# Patient Record
Sex: MALE | Race: WHITE | Employment: OTHER | URBAN - NONMETROPOLITAN AREA
[De-identification: names, ages, dates, MRNs, and addresses within clinical notes are randomized per-mention and may not be internally consistent; named-entity substitution may affect disease eponyms.]

---

## 2017-03-06 ENCOUNTER — HOSPITAL ENCOUNTER (OUTPATIENT)
Dept: VASCULAR LAB | Age: 72
Discharge: HOME | End: 2017-03-06
Payer: MEDICARE

## 2017-03-06 DIAGNOSIS — R25.2 CRAMPS OF LOWER EXTREMITY, UNSPECIFIED LATERALITY: Primary | ICD-10-CM

## 2017-03-06 DIAGNOSIS — I73.9 PVD (PERIPHERAL VASCULAR DISEASE) WITH CLAUDICATION (HCC): ICD-10-CM

## 2017-03-06 DIAGNOSIS — I73.89 ACROCYANOSIS (HCC): ICD-10-CM

## 2017-03-06 DIAGNOSIS — I73.9 PVD (PERIPHERAL VASCULAR DISEASE) (HCC): ICD-10-CM

## 2017-03-06 PROCEDURE — 93923 UPR/LXTR ART STDY 3+ LVLS: CPT

## 2017-03-15 ENCOUNTER — OFFICE VISIT (OUTPATIENT)
Dept: VASCULAR SURGERY | Age: 72
End: 2017-03-15
Payer: MEDICARE

## 2017-03-15 ENCOUNTER — HOSPITAL ENCOUNTER (OUTPATIENT)
Dept: VASCULAR LAB | Age: 72
Discharge: HOME | End: 2017-03-15
Payer: MEDICARE

## 2017-03-15 VITALS
TEMPERATURE: 98.3 F | DIASTOLIC BLOOD PRESSURE: 88 MMHG | HEART RATE: 87 BPM | RESPIRATION RATE: 18 BRPM | SYSTOLIC BLOOD PRESSURE: 158 MMHG

## 2017-03-15 DIAGNOSIS — I70.213 ATHEROSCLER OF NATIVE ARTERY OF BOTH LEGS WITH INTERMIT CLAUDICATION (HCC): ICD-10-CM

## 2017-03-15 DIAGNOSIS — I65.23 BILATERAL CAROTID ARTERY STENOSIS: Primary | ICD-10-CM

## 2017-03-15 DIAGNOSIS — I71.40 AAA (ABDOMINAL AORTIC ANEURYSM) WITHOUT RUPTURE: ICD-10-CM

## 2017-03-15 PROCEDURE — G8427 DOCREV CUR MEDS BY ELIG CLIN: HCPCS | Performed by: NURSE PRACTITIONER

## 2017-03-15 PROCEDURE — 4040F PNEUMOC VAC/ADMIN/RCVD: CPT | Performed by: NURSE PRACTITIONER

## 2017-03-15 PROCEDURE — 99213 OFFICE O/P EST LOW 20 MIN: CPT | Performed by: NURSE PRACTITIONER

## 2017-03-15 PROCEDURE — 3017F COLORECTAL CA SCREEN DOC REV: CPT | Performed by: NURSE PRACTITIONER

## 2017-03-15 PROCEDURE — G8598 ASA/ANTIPLAT THER USED: HCPCS | Performed by: NURSE PRACTITIONER

## 2017-03-15 PROCEDURE — 4004F PT TOBACCO SCREEN RCVD TLK: CPT | Performed by: NURSE PRACTITIONER

## 2017-03-15 PROCEDURE — G8419 CALC BMI OUT NRM PARAM NOF/U: HCPCS | Performed by: NURSE PRACTITIONER

## 2017-03-15 PROCEDURE — G8484 FLU IMMUNIZE NO ADMIN: HCPCS | Performed by: NURSE PRACTITIONER

## 2017-03-15 PROCEDURE — 93880 EXTRACRANIAL BILAT STUDY: CPT

## 2017-03-15 PROCEDURE — 1123F ACP DISCUSS/DSCN MKR DOCD: CPT | Performed by: NURSE PRACTITIONER

## 2017-03-16 ENCOUNTER — TELEPHONE (OUTPATIENT)
Dept: VASCULAR SURGERY | Age: 72
End: 2017-03-16

## 2017-03-20 ENCOUNTER — HOSPITAL ENCOUNTER (OUTPATIENT)
Dept: CT IMAGING | Age: 72
Discharge: HOME | End: 2017-03-20
Payer: MEDICARE

## 2017-03-20 DIAGNOSIS — I65.23 BILATERAL CAROTID ARTERY STENOSIS: ICD-10-CM

## 2017-03-20 LAB
GFR NON-AFRICAN AMERICAN: 60
PERFORMED ON: ABNORMAL
POC CREATININE: 1.2 MG/DL (ref 0.3–1.3)
POC SAMPLE TYPE: ABNORMAL

## 2017-03-20 PROCEDURE — 82565 ASSAY OF CREATININE: CPT

## 2017-03-20 PROCEDURE — 70498 CT ANGIOGRAPHY NECK: CPT

## 2017-03-20 PROCEDURE — 70496 CT ANGIOGRAPHY HEAD: CPT

## 2017-03-20 PROCEDURE — 6360000004 HC RX CONTRAST MEDICATION: Performed by: NURSE PRACTITIONER

## 2017-03-20 RX ADMIN — IOVERSOL 90 ML: 741 INJECTION INTRA-ARTERIAL; INTRAVENOUS at 11:46

## 2017-03-29 ENCOUNTER — OFFICE VISIT (OUTPATIENT)
Dept: CARDIOLOGY | Facility: CLINIC | Age: 72
End: 2017-03-29

## 2017-03-29 VITALS
HEIGHT: 72 IN | DIASTOLIC BLOOD PRESSURE: 78 MMHG | HEART RATE: 61 BPM | OXYGEN SATURATION: 99 % | SYSTOLIC BLOOD PRESSURE: 152 MMHG | BODY MASS INDEX: 30.56 KG/M2 | WEIGHT: 225.6 LBS

## 2017-03-29 DIAGNOSIS — I25.10 CORONARY ARTERY DISEASE INVOLVING NATIVE CORONARY ARTERY OF NATIVE HEART WITHOUT ANGINA PECTORIS: ICD-10-CM

## 2017-03-29 DIAGNOSIS — Z95.5 STENTED CORONARY ARTERY: ICD-10-CM

## 2017-03-29 DIAGNOSIS — I10 ESSENTIAL HYPERTENSION: ICD-10-CM

## 2017-03-29 DIAGNOSIS — E78.2 MIXED HYPERLIPIDEMIA: Primary | ICD-10-CM

## 2017-03-29 PROCEDURE — 93000 ELECTROCARDIOGRAM COMPLETE: CPT | Performed by: INTERNAL MEDICINE

## 2017-03-29 PROCEDURE — 99214 OFFICE O/P EST MOD 30 MIN: CPT | Performed by: INTERNAL MEDICINE

## 2017-03-29 RX ORDER — ATORVASTATIN CALCIUM 40 MG/1
40 TABLET, FILM COATED ORAL DAILY
COMMUNITY
End: 2017-06-09 | Stop reason: SDUPTHER

## 2017-03-29 RX ORDER — AMOXICILLIN 500 MG/1
1000 CAPSULE ORAL 2 TIMES DAILY
COMMUNITY
End: 2017-04-18

## 2017-03-29 NOTE — PROGRESS NOTES
Robin Herrera  [id number]  1945  71 y.o.  male    Referring Provider: Wilber Rivera Jr., MD    Reason for Follow-up Visit:CAD Stented 20101 now awaiting left carotid endarterectomy for severe stenosis  Subjective .   Doing well No chest pain or shortness of breath  Fairly active    History of present illness:  Robin Herrera is a 71 y.o. yo male with history of CAD who presents today for   Chief Complaint   Patient presents with   • Coronary Artery Disease     6 month f/u    .    History  Past Medical History:   Diagnosis Date   • AAA (abdominal aortic aneurysm)    • CAD in native artery    • Headache    • Hypertension    ,   Past Surgical History:   Procedure Laterality Date   • CARDIAC CATHETERIZATION     • CAROTID STENT  2010   • EYE SURGERY Bilateral     cataracts    • PROSTATECTOMY     • TONSILLECTOMY     ,   Family History   Problem Relation Age of Onset   • Alzheimer's disease Father    • Kidney failure Father    • Coronary artery disease Neg Hx    ,   Social History   Substance Use Topics   • Smoking status: Former Smoker     Packs/day: 0.50     Types: Cigarettes   • Smokeless tobacco: None      Comment: 03/2017   • Alcohol use Yes      Comment: MODERATE DRINKS BEER   ,     Medications  Current Outpatient Prescriptions   Medication Sig Dispense Refill   • Acetylcysteine 600 MG capsule Take  by mouth.     • amLODIPine (NORVASC) 5 MG tablet Take 5 mg by mouth daily.     • amoxicillin (AMOXIL) 500 MG capsule Take 1,000 mg by mouth 2 (Two) Times a Day.     • aspirin 325 MG tablet Take 325 mg by mouth daily.     • atenolol (TENORMIN) 50 MG tablet Take 25 mg by mouth daily.     • atorvastatin (LIPITOR) 40 MG tablet Take 40 mg by mouth Daily.     • cholecalciferol (VITAMIN D3) 1000 UNITS tablet Take 1,000 Units by mouth daily.     • coenzyme Q10 100 MG capsule Take 100 mg by mouth daily.     • levothyroxine (SYNTHROID, LEVOTHROID) 137 MCG tablet Take 0.125 mcg by mouth Daily.     • lisinopril  "(PRINIVIL,ZESTRIL) 20 MG tablet Take 20 mg by mouth daily.     • Multiple Vitamins-Minerals (MULTIVITAMIN ADULT PO) Take  by mouth.     • nicotine polacrilex (NICORETTE) 4 MG gum Chew 4 mg As Needed for smoking cessation.     • prednisoLONE acetate (PRED MILD) 0.12 % ophthalmic suspension Administer 1 drop into the left eye 2 (Two) Times a Day.     • nitroglycerin (NITROSTAT) 0.4 MG SL tablet Place 0.4 mg under the tongue every 5 (five) minutes as needed for chest pain. Take no more than 3 doses in 15 minutes.       No current facility-administered medications for this visit.        Allergies:  Pollen extract    Review of Systems  Review of Systems   Constitution: Negative.   HENT: Negative.    Eyes: Negative.    Cardiovascular: Negative for chest pain, claudication, cyanosis, dyspnea on exertion, irregular heartbeat, leg swelling, near-syncope, orthopnea, palpitations, paroxysmal nocturnal dyspnea and syncope.   Respiratory: Negative.    Endocrine: Negative.    Hematologic/Lymphatic: Negative.    Skin: Negative.    Gastrointestinal: Negative for anorexia.   Genitourinary: Negative.    Neurological: Negative.    Psychiatric/Behavioral: Negative.        Objective     Physical Exam:  /78 (BP Location: Left arm, Patient Position: Sitting, Cuff Size: Adult)  Pulse 61  Ht 72\" (182.9 cm)  Wt 225 lb 9.6 oz (102 kg)  SpO2 99%  BMI 30.6 kg/m2  Physical Exam   Constitutional: He appears well-developed.   HENT:   Head: Normocephalic.   Neck: Normal carotid pulses and no JVD present. No tracheal tenderness present. Carotid bruit is not present. No tracheal deviation and no edema present.   Cardiovascular: Regular rhythm, normal heart sounds and normal pulses.    Pulmonary/Chest: Effort normal. No stridor.   Abdominal: Soft.   Neurological: He is alert. He has normal strength. No cranial nerve deficit or sensory deficit.   Skin: Skin is warm.   Psychiatric: He has a normal mood and affect. His speech is normal and " behavior is normal.       Results Review:       ECG 12 Lead  Date/Time: 3/29/2017 8:40 AM  Performed by: STEPHANIE DOWLING  Authorized by: STEPHANIE DOWLING   Comparison: compared with previous ECG from 9/27/2016  Comparison to previous ECG: RBBB new  Rhythm: sinus rhythm  Rate: normal  Conduction: right bundle branch block  QRS axis: normal  Clinical impression: abnormal ECG            Assessment/Plan   Patient Active Problem List   Diagnosis   • Hyperlipemia   • Essential hypertension   • Coronary artery disease involving native coronary artery of native heart without angina pectoris   • Stented coronary artery       Stable doing well. No chest pain or excessive dyspnea. No palpitations. No significant pedal edema. Compliant with medications and diet. Latest labs and medications reviewed.  Stress nuclear 2016 normal    Plan:    Acceptable Cardiovascular risk of carotid endarterectomy  No additional cardiac testing required at this point in time.  Keep follow up with me in 9 months  Close follow up with you as scheduled.  Intensive risk factor modifications  See order list.       Return in about 9 months (around 12/29/2017).

## 2017-03-30 ENCOUNTER — HOSPITAL ENCOUNTER (OUTPATIENT)
Dept: PREADMISSION TESTING | Age: 72
Discharge: HOME | End: 2017-03-30
Payer: MEDICARE

## 2017-03-30 ENCOUNTER — TELEPHONE (OUTPATIENT)
Dept: VASCULAR SURGERY | Age: 72
End: 2017-03-30

## 2017-03-30 ENCOUNTER — HOSPITAL ENCOUNTER (OUTPATIENT)
Dept: GENERAL RADIOLOGY | Age: 72
Discharge: HOME | End: 2017-03-30
Payer: MEDICARE

## 2017-03-30 ENCOUNTER — OFFICE VISIT (OUTPATIENT)
Dept: VASCULAR SURGERY | Age: 72
End: 2017-03-30
Payer: MEDICARE

## 2017-03-30 VITALS — HEIGHT: 72 IN | WEIGHT: 223 LBS | BODY MASS INDEX: 30.2 KG/M2

## 2017-03-30 VITALS
DIASTOLIC BLOOD PRESSURE: 80 MMHG | TEMPERATURE: 97.5 F | HEART RATE: 60 BPM | SYSTOLIC BLOOD PRESSURE: 162 MMHG | RESPIRATION RATE: 18 BRPM

## 2017-03-30 DIAGNOSIS — K04.7 TOOTH ABSCESS: Primary | ICD-10-CM

## 2017-03-30 DIAGNOSIS — I65.23 BILATERAL CAROTID ARTERY STENOSIS: Primary | ICD-10-CM

## 2017-03-30 LAB
ANION GAP SERPL CALCULATED.3IONS-SCNC: 15 MMOL/L (ref 7–19)
APTT: 29.5 SEC (ref 26–36.2)
BASOPHILS ABSOLUTE: 0 K/UL (ref 0–0.2)
BASOPHILS RELATIVE PERCENT: 0.4 % (ref 0–1)
BUN BLDV-MCNC: 18 MG/DL (ref 8–23)
CALCIUM SERPL-MCNC: 10.3 MG/DL (ref 8.8–10.2)
CHLORIDE BLD-SCNC: 100 MMOL/L (ref 98–111)
CO2: 26 MMOL/L (ref 22–29)
CREAT SERPL-MCNC: 1.1 MG/DL (ref 0.5–1.2)
EOSINOPHILS ABSOLUTE: 0.1 K/UL (ref 0–0.6)
EOSINOPHILS RELATIVE PERCENT: 1.5 % (ref 0–5)
GFR NON-AFRICAN AMERICAN: >60
GLUCOSE BLD-MCNC: 83 MG/DL (ref 74–109)
HCT VFR BLD CALC: 42.2 % (ref 42–52)
HEMOGLOBIN: 15.1 G/DL (ref 14–18)
INR BLD: 1 (ref 0.88–1.18)
LYMPHOCYTES ABSOLUTE: 2.1 K/UL (ref 1.1–4.5)
LYMPHOCYTES RELATIVE PERCENT: 25.7 % (ref 20–40)
MCH RBC QN AUTO: 35.4 PG (ref 27–31)
MCHC RBC AUTO-ENTMCNC: 35.8 G/DL (ref 33–37)
MCV RBC AUTO: 98.8 FL (ref 80–94)
MONOCYTES ABSOLUTE: 0.9 K/UL (ref 0–0.9)
MONOCYTES RELATIVE PERCENT: 11.6 % (ref 0–10)
NEUTROPHILS ABSOLUTE: 4.9 K/UL (ref 1.5–7.5)
NEUTROPHILS RELATIVE PERCENT: 60.8 % (ref 50–65)
PDW BLD-RTO: 12.8 % (ref 11.5–14.5)
PLATELET # BLD: 210 K/UL (ref 130–400)
PMV BLD AUTO: 12.3 FL (ref 7.4–10.4)
POTASSIUM SERPL-SCNC: 3.5 MMOL/L (ref 3.5–5)
PROTHROMBIN TIME: 13.2 SEC (ref 12–14.6)
RBC # BLD: 4.27 M/UL (ref 4.7–6.1)
SODIUM BLD-SCNC: 141 MMOL/L (ref 136–145)
WBC # BLD: 8 K/UL (ref 4.8–10.8)

## 2017-03-30 PROCEDURE — 80048 BASIC METABOLIC PNL TOTAL CA: CPT

## 2017-03-30 PROCEDURE — 71020 XR CHEST STANDARD TWO VW: CPT

## 2017-03-30 PROCEDURE — 4040F PNEUMOC VAC/ADMIN/RCVD: CPT | Performed by: NURSE PRACTITIONER

## 2017-03-30 PROCEDURE — G8484 FLU IMMUNIZE NO ADMIN: HCPCS | Performed by: NURSE PRACTITIONER

## 2017-03-30 PROCEDURE — 1036F TOBACCO NON-USER: CPT | Performed by: NURSE PRACTITIONER

## 2017-03-30 PROCEDURE — G8419 CALC BMI OUT NRM PARAM NOF/U: HCPCS | Performed by: NURSE PRACTITIONER

## 2017-03-30 PROCEDURE — 1123F ACP DISCUSS/DSCN MKR DOCD: CPT | Performed by: NURSE PRACTITIONER

## 2017-03-30 PROCEDURE — 99213 OFFICE O/P EST LOW 20 MIN: CPT | Performed by: NURSE PRACTITIONER

## 2017-03-30 PROCEDURE — 85610 PROTHROMBIN TIME: CPT

## 2017-03-30 PROCEDURE — G8427 DOCREV CUR MEDS BY ELIG CLIN: HCPCS | Performed by: NURSE PRACTITIONER

## 2017-03-30 PROCEDURE — 85730 THROMBOPLASTIN TIME PARTIAL: CPT

## 2017-03-30 PROCEDURE — 3017F COLORECTAL CA SCREEN DOC REV: CPT | Performed by: NURSE PRACTITIONER

## 2017-03-30 PROCEDURE — 85025 COMPLETE CBC W/AUTO DIFF WBC: CPT

## 2017-03-30 PROCEDURE — G8598 ASA/ANTIPLAT THER USED: HCPCS | Performed by: NURSE PRACTITIONER

## 2017-03-30 PROCEDURE — 87070 CULTURE OTHR SPECIMN AEROBIC: CPT

## 2017-03-30 RX ORDER — LEVOTHYROXINE SODIUM 0.12 MG/1
137 TABLET ORAL DAILY
COMMUNITY

## 2017-03-30 RX ORDER — NITROGLYCERIN 0.4 MG/1
0.4 TABLET SUBLINGUAL
COMMUNITY
End: 2017-03-30

## 2017-03-30 RX ORDER — CLOPIDOGREL BISULFATE 75 MG/1
75 TABLET ORAL DAILY
Qty: 30 TABLET | Refills: 3 | Status: ON HOLD | OUTPATIENT
Start: 2017-03-30 | End: 2017-04-14

## 2017-03-30 RX ORDER — AMOXICILLIN 500 MG/1
500 CAPSULE ORAL 4 TIMES DAILY
Status: ON HOLD | COMMUNITY
End: 2017-04-12 | Stop reason: ALTCHOICE

## 2017-03-31 ENCOUNTER — PREP FOR PROCEDURE (OUTPATIENT)
Dept: VASCULAR SURGERY | Age: 72
End: 2017-03-31

## 2017-03-31 LAB — MRSA CULTURE ONLY: NORMAL

## 2017-03-31 RX ORDER — SODIUM CHLORIDE 0.9 % (FLUSH) 0.9 %
10 SYRINGE (ML) INJECTION EVERY 12 HOURS SCHEDULED
Status: CANCELLED | OUTPATIENT
Start: 2017-03-31

## 2017-03-31 RX ORDER — SODIUM CHLORIDE 0.9 % (FLUSH) 0.9 %
10 SYRINGE (ML) INJECTION PRN
Status: CANCELLED | OUTPATIENT
Start: 2017-03-31

## 2017-03-31 RX ORDER — ASPIRIN 81 MG/1
81 TABLET ORAL ONCE
Status: CANCELLED | OUTPATIENT
Start: 2017-03-31 | End: 2017-03-31

## 2017-04-10 ENCOUNTER — TELEPHONE (OUTPATIENT)
Dept: VASCULAR SURGERY | Age: 72
End: 2017-04-10

## 2017-04-12 ENCOUNTER — ANESTHESIA (OUTPATIENT)
Dept: OPERATING ROOM | Age: 72
DRG: 038 | End: 2017-04-12
Payer: MEDICARE

## 2017-04-12 ENCOUNTER — HOSPITAL ENCOUNTER (INPATIENT)
Age: 72
LOS: 2 days | Discharge: HOME | DRG: 038 | End: 2017-04-14
Attending: SURGERY | Admitting: SURGERY
Payer: MEDICARE

## 2017-04-12 ENCOUNTER — APPOINTMENT (OUTPATIENT)
Dept: INTERVENTIONAL RADIOLOGY/VASCULAR | Age: 72
DRG: 038 | End: 2017-04-12
Attending: SURGERY
Payer: MEDICARE

## 2017-04-12 ENCOUNTER — ANESTHESIA EVENT (OUTPATIENT)
Dept: OPERATING ROOM | Age: 72
DRG: 038 | End: 2017-04-12
Payer: MEDICARE

## 2017-04-12 VITALS — OXYGEN SATURATION: 100 % | TEMPERATURE: 97.9 F | RESPIRATION RATE: 3 BRPM

## 2017-04-12 PROCEDURE — C1776 JOINT DEVICE (IMPLANTABLE): HCPCS | Performed by: SURGERY

## 2017-04-12 PROCEDURE — 3700000000 HC ANESTHESIA ATTENDED CARE: Performed by: SURGERY

## 2017-04-12 PROCEDURE — 2720000001 HC MISC SURG SUPPLY STERILE $51-500: Performed by: SURGERY

## 2017-04-12 PROCEDURE — 6360000002 HC RX W HCPCS: Performed by: SURGERY

## 2017-04-12 PROCEDURE — 35301 RECHANNELING OF ARTERY: CPT | Performed by: SURGERY

## 2017-04-12 PROCEDURE — 6370000000 HC RX 637 (ALT 250 FOR IP): Performed by: NURSE PRACTITIONER

## 2017-04-12 PROCEDURE — 2500000003 HC RX 250 WO HCPCS: Performed by: NURSE ANESTHETIST, CERTIFIED REGISTERED

## 2017-04-12 PROCEDURE — 2580000003 HC RX 258: Performed by: SURGERY

## 2017-04-12 PROCEDURE — 03CL0ZZ EXTIRPATION OF MATTER FROM LEFT INTERNAL CAROTID ARTERY, OPEN APPROACH: ICD-10-PCS | Performed by: SURGERY

## 2017-04-12 PROCEDURE — 7100000001 HC PACU RECOVERY - ADDTL 15 MIN: Performed by: SURGERY

## 2017-04-12 PROCEDURE — 3600000005 HC SURGERY LEVEL 5 BASE: Performed by: SURGERY

## 2017-04-12 PROCEDURE — C1729 CATH, DRAINAGE: HCPCS | Performed by: SURGERY

## 2017-04-12 PROCEDURE — 03CN0ZZ EXTIRPATION OF MATTER FROM LEFT EXTERNAL CAROTID ARTERY, OPEN APPROACH: ICD-10-PCS | Performed by: SURGERY

## 2017-04-12 PROCEDURE — 03UL0KZ SUPPLEMENT LEFT INTERNAL CAROTID ARTERY WITH NONAUTOLOGOUS TISSUE SUBSTITUTE, OPEN APPROACH: ICD-10-PCS | Performed by: SURGERY

## 2017-04-12 PROCEDURE — 2580000003 HC RX 258: Performed by: ANESTHESIOLOGY

## 2017-04-12 PROCEDURE — 6370000000 HC RX 637 (ALT 250 FOR IP): Performed by: SURGERY

## 2017-04-12 PROCEDURE — 3700000001 HC ADD 15 MINUTES (ANESTHESIA): Performed by: SURGERY

## 2017-04-12 PROCEDURE — L8612 AQUEOUS SHUNT PROSTHESIS: HCPCS | Performed by: SURGERY

## 2017-04-12 PROCEDURE — 3600000015 HC SURGERY LEVEL 5 ADDTL 15MIN: Performed by: SURGERY

## 2017-04-12 PROCEDURE — 6360000002 HC RX W HCPCS: Performed by: NURSE PRACTITIONER

## 2017-04-12 PROCEDURE — 6360000002 HC RX W HCPCS: Performed by: NURSE ANESTHETIST, CERTIFIED REGISTERED

## 2017-04-12 PROCEDURE — 7100000000 HC PACU RECOVERY - FIRST 15 MIN: Performed by: SURGERY

## 2017-04-12 PROCEDURE — 1210000000 HC MED SURG R&B

## 2017-04-12 PROCEDURE — 03CJ0ZZ EXTIRPATION OF MATTER FROM LEFT COMMON CAROTID ARTERY, OPEN APPROACH: ICD-10-PCS | Performed by: SURGERY

## 2017-04-12 PROCEDURE — 03UJ0KZ SUPPLEMENT LEFT COMMON CAROTID ARTERY WITH NONAUTOLOGOUS TISSUE SUBSTITUTE, OPEN APPROACH: ICD-10-PCS | Performed by: SURGERY

## 2017-04-12 PROCEDURE — 88304 TISSUE EXAM BY PATHOLOGIST: CPT

## 2017-04-12 PROCEDURE — 6360000002 HC RX W HCPCS: Performed by: ANESTHESIOLOGY

## 2017-04-12 DEVICE — PATCH BIOLOGIC VASC 1CM WX14CM L .55MM THICKNESSXENOSURE: Type: IMPLANTABLE DEVICE | Site: NECK | Status: FUNCTIONAL

## 2017-04-12 RX ORDER — LABETALOL HYDROCHLORIDE 5 MG/ML
5 INJECTION, SOLUTION INTRAVENOUS EVERY 10 MIN PRN
Status: DISCONTINUED | OUTPATIENT
Start: 2017-04-12 | End: 2017-04-12 | Stop reason: HOSPADM

## 2017-04-12 RX ORDER — AMLODIPINE BESYLATE 5 MG/1
5 TABLET ORAL DAILY
Status: DISCONTINUED | OUTPATIENT
Start: 2017-04-13 | End: 2017-04-14 | Stop reason: HOSPADM

## 2017-04-12 RX ORDER — EPHEDRINE SULFATE 50 MG/ML
INJECTION, SOLUTION INTRAVENOUS PRN
Status: DISCONTINUED | OUTPATIENT
Start: 2017-04-12 | End: 2017-04-12 | Stop reason: SDUPTHER

## 2017-04-12 RX ORDER — MIDAZOLAM HYDROCHLORIDE 1 MG/ML
2 INJECTION INTRAMUSCULAR; INTRAVENOUS
Status: COMPLETED | OUTPATIENT
Start: 2017-04-12 | End: 2017-04-12

## 2017-04-12 RX ORDER — FENTANYL CITRATE 50 UG/ML
25 INJECTION, SOLUTION INTRAMUSCULAR; INTRAVENOUS
Status: DISCONTINUED | OUTPATIENT
Start: 2017-04-12 | End: 2017-04-12 | Stop reason: HOSPADM

## 2017-04-12 RX ORDER — HYDROCODONE BITARTRATE AND ACETAMINOPHEN 5; 325 MG/1; MG/1
1 TABLET ORAL EVERY 4 HOURS PRN
Status: DISCONTINUED | OUTPATIENT
Start: 2017-04-12 | End: 2017-04-14 | Stop reason: HOSPADM

## 2017-04-12 RX ORDER — ASPIRIN 325 MG
325 TABLET ORAL DAILY
Status: DISCONTINUED | OUTPATIENT
Start: 2017-04-13 | End: 2017-04-14 | Stop reason: HOSPADM

## 2017-04-12 RX ORDER — PROPOFOL 10 MG/ML
INJECTION, EMULSION INTRAVENOUS PRN
Status: DISCONTINUED | OUTPATIENT
Start: 2017-04-12 | End: 2017-04-12 | Stop reason: SDUPTHER

## 2017-04-12 RX ORDER — ONDANSETRON 2 MG/ML
INJECTION INTRAMUSCULAR; INTRAVENOUS PRN
Status: DISCONTINUED | OUTPATIENT
Start: 2017-04-12 | End: 2017-04-12 | Stop reason: SDUPTHER

## 2017-04-12 RX ORDER — SODIUM CHLORIDE 9 MG/ML
INJECTION, SOLUTION INTRAVENOUS CONTINUOUS
Status: ACTIVE | OUTPATIENT
Start: 2017-04-12 | End: 2017-04-12

## 2017-04-12 RX ORDER — LIDOCAINE HYDROCHLORIDE 10 MG/ML
1 INJECTION, SOLUTION EPIDURAL; INFILTRATION; INTRACAUDAL; PERINEURAL ONCE
Status: DISCONTINUED | OUTPATIENT
Start: 2017-04-12 | End: 2017-04-12 | Stop reason: HOSPADM

## 2017-04-12 RX ORDER — ENALAPRILAT 2.5 MG/2ML
1.25 INJECTION INTRAVENOUS
Status: DISCONTINUED | OUTPATIENT
Start: 2017-04-12 | End: 2017-04-12 | Stop reason: HOSPADM

## 2017-04-12 RX ORDER — SODIUM CHLORIDE 0.9 % (FLUSH) 0.9 %
10 SYRINGE (ML) INJECTION EVERY 12 HOURS SCHEDULED
Status: DISCONTINUED | OUTPATIENT
Start: 2017-04-12 | End: 2017-04-14 | Stop reason: HOSPADM

## 2017-04-12 RX ORDER — LIDOCAINE HYDROCHLORIDE 10 MG/ML
INJECTION, SOLUTION EPIDURAL; INFILTRATION; INTRACAUDAL; PERINEURAL PRN
Status: DISCONTINUED | OUTPATIENT
Start: 2017-04-12 | End: 2017-04-12 | Stop reason: SDUPTHER

## 2017-04-12 RX ORDER — HYDRALAZINE HYDROCHLORIDE 20 MG/ML
5 INJECTION INTRAMUSCULAR; INTRAVENOUS EVERY 10 MIN PRN
Status: DISCONTINUED | OUTPATIENT
Start: 2017-04-12 | End: 2017-04-12 | Stop reason: HOSPADM

## 2017-04-12 RX ORDER — SODIUM CHLORIDE 0.9 % (FLUSH) 0.9 %
10 SYRINGE (ML) INJECTION PRN
Status: DISCONTINUED | OUTPATIENT
Start: 2017-04-12 | End: 2017-04-12 | Stop reason: HOSPADM

## 2017-04-12 RX ORDER — ROCURONIUM BROMIDE 10 MG/ML
INJECTION, SOLUTION INTRAVENOUS PRN
Status: DISCONTINUED | OUTPATIENT
Start: 2017-04-12 | End: 2017-04-12 | Stop reason: SDUPTHER

## 2017-04-12 RX ORDER — ONDANSETRON 2 MG/ML
4 INJECTION INTRAMUSCULAR; INTRAVENOUS EVERY 6 HOURS PRN
Status: DISCONTINUED | OUTPATIENT
Start: 2017-04-12 | End: 2017-04-14 | Stop reason: HOSPADM

## 2017-04-12 RX ORDER — CLOPIDOGREL BISULFATE 75 MG/1
75 TABLET ORAL DAILY
Status: DISCONTINUED | OUTPATIENT
Start: 2017-04-13 | End: 2017-04-14 | Stop reason: HOSPADM

## 2017-04-12 RX ORDER — SODIUM CHLORIDE, SODIUM LACTATE, POTASSIUM CHLORIDE, CALCIUM CHLORIDE 600; 310; 30; 20 MG/100ML; MG/100ML; MG/100ML; MG/100ML
INJECTION, SOLUTION INTRAVENOUS CONTINUOUS
Status: DISCONTINUED | OUTPATIENT
Start: 2017-04-12 | End: 2017-04-14 | Stop reason: HOSPADM

## 2017-04-12 RX ORDER — MORPHINE SULFATE 4 MG/ML
4 INJECTION, SOLUTION INTRAMUSCULAR; INTRAVENOUS EVERY 5 MIN PRN
Status: DISCONTINUED | OUTPATIENT
Start: 2017-04-12 | End: 2017-04-12 | Stop reason: HOSPADM

## 2017-04-12 RX ORDER — FENTANYL CITRATE 50 UG/ML
INJECTION, SOLUTION INTRAMUSCULAR; INTRAVENOUS PRN
Status: DISCONTINUED | OUTPATIENT
Start: 2017-04-12 | End: 2017-04-12 | Stop reason: SDUPTHER

## 2017-04-12 RX ORDER — SODIUM CHLORIDE, SODIUM LACTATE, POTASSIUM CHLORIDE, CALCIUM CHLORIDE 600; 310; 30; 20 MG/100ML; MG/100ML; MG/100ML; MG/100ML
INJECTION, SOLUTION INTRAVENOUS CONTINUOUS
Status: DISCONTINUED | OUTPATIENT
Start: 2017-04-12 | End: 2017-04-12

## 2017-04-12 RX ORDER — NITROGLYCERIN 0.4 MG/1
0.4 TABLET SUBLINGUAL EVERY 5 MIN PRN
Status: DISCONTINUED | OUTPATIENT
Start: 2017-04-12 | End: 2017-04-14 | Stop reason: HOSPADM

## 2017-04-12 RX ORDER — HYDROCODONE BITARTRATE AND ACETAMINOPHEN 5; 325 MG/1; MG/1
2 TABLET ORAL EVERY 4 HOURS PRN
Status: DISCONTINUED | OUTPATIENT
Start: 2017-04-12 | End: 2017-04-14 | Stop reason: HOSPADM

## 2017-04-12 RX ORDER — LEVOTHYROXINE SODIUM 0.12 MG/1
125 TABLET ORAL DAILY
Status: DISCONTINUED | OUTPATIENT
Start: 2017-04-13 | End: 2017-04-14 | Stop reason: HOSPADM

## 2017-04-12 RX ORDER — MEPERIDINE HYDROCHLORIDE 25 MG/ML
12.5 INJECTION INTRAMUSCULAR; INTRAVENOUS; SUBCUTANEOUS EVERY 5 MIN PRN
Status: DISCONTINUED | OUTPATIENT
Start: 2017-04-12 | End: 2017-04-12 | Stop reason: HOSPADM

## 2017-04-12 RX ORDER — SODIUM CHLORIDE 0.9 % (FLUSH) 0.9 %
10 SYRINGE (ML) INJECTION PRN
Status: DISCONTINUED | OUTPATIENT
Start: 2017-04-12 | End: 2017-04-14 | Stop reason: HOSPADM

## 2017-04-12 RX ORDER — ATENOLOL 25 MG/1
25 TABLET ORAL DAILY
Status: DISCONTINUED | OUTPATIENT
Start: 2017-04-13 | End: 2017-04-14 | Stop reason: HOSPADM

## 2017-04-12 RX ORDER — ATORVASTATIN CALCIUM 40 MG/1
40 TABLET, FILM COATED ORAL NIGHTLY
Status: DISCONTINUED | OUTPATIENT
Start: 2017-04-12 | End: 2017-04-14 | Stop reason: HOSPADM

## 2017-04-12 RX ORDER — ASPIRIN 81 MG/1
81 TABLET ORAL ONCE
Status: COMPLETED | OUTPATIENT
Start: 2017-04-12 | End: 2017-04-12

## 2017-04-12 RX ORDER — PROTAMINE SULFATE 10 MG/ML
INJECTION, SOLUTION INTRAVENOUS PRN
Status: DISCONTINUED | OUTPATIENT
Start: 2017-04-12 | End: 2017-04-12 | Stop reason: SDUPTHER

## 2017-04-12 RX ORDER — SODIUM CHLORIDE 0.9 % (FLUSH) 0.9 %
10 SYRINGE (ML) INJECTION EVERY 12 HOURS SCHEDULED
Status: DISCONTINUED | OUTPATIENT
Start: 2017-04-12 | End: 2017-04-12 | Stop reason: HOSPADM

## 2017-04-12 RX ORDER — PROMETHAZINE HYDROCHLORIDE 25 MG/ML
6.25 INJECTION, SOLUTION INTRAMUSCULAR; INTRAVENOUS
Status: DISCONTINUED | OUTPATIENT
Start: 2017-04-12 | End: 2017-04-12 | Stop reason: HOSPADM

## 2017-04-12 RX ORDER — DEXAMETHASONE SODIUM PHOSPHATE 10 MG/ML
INJECTION INTRAMUSCULAR; INTRAVENOUS PRN
Status: DISCONTINUED | OUTPATIENT
Start: 2017-04-12 | End: 2017-04-12 | Stop reason: SDUPTHER

## 2017-04-12 RX ORDER — LISINOPRIL 20 MG/1
20 TABLET ORAL DAILY
Status: DISCONTINUED | OUTPATIENT
Start: 2017-04-12 | End: 2017-04-14 | Stop reason: HOSPADM

## 2017-04-12 RX ORDER — LIDOCAINE HYDROCHLORIDE 10 MG/ML
1 INJECTION, SOLUTION EPIDURAL; INFILTRATION; INTRACAUDAL; PERINEURAL
Status: DISCONTINUED | OUTPATIENT
Start: 2017-04-12 | End: 2017-04-12 | Stop reason: HOSPADM

## 2017-04-12 RX ORDER — ACETAMINOPHEN 325 MG/1
650 TABLET ORAL EVERY 4 HOURS PRN
Status: DISCONTINUED | OUTPATIENT
Start: 2017-04-12 | End: 2017-04-14 | Stop reason: HOSPADM

## 2017-04-12 RX ORDER — CLONIDINE HYDROCHLORIDE 0.1 MG/1
0.1 TABLET ORAL
Status: DISCONTINUED | OUTPATIENT
Start: 2017-04-12 | End: 2017-04-14 | Stop reason: HOSPADM

## 2017-04-12 RX ORDER — METOCLOPRAMIDE HYDROCHLORIDE 5 MG/ML
10 INJECTION INTRAMUSCULAR; INTRAVENOUS
Status: DISCONTINUED | OUTPATIENT
Start: 2017-04-12 | End: 2017-04-12 | Stop reason: HOSPADM

## 2017-04-12 RX ORDER — MORPHINE SULFATE 4 MG/ML
2 INJECTION, SOLUTION INTRAMUSCULAR; INTRAVENOUS EVERY 5 MIN PRN
Status: DISCONTINUED | OUTPATIENT
Start: 2017-04-12 | End: 2017-04-12 | Stop reason: HOSPADM

## 2017-04-12 RX ORDER — DIPHENHYDRAMINE HYDROCHLORIDE 50 MG/ML
12.5 INJECTION INTRAMUSCULAR; INTRAVENOUS
Status: DISCONTINUED | OUTPATIENT
Start: 2017-04-12 | End: 2017-04-12 | Stop reason: HOSPADM

## 2017-04-12 RX ORDER — FENTANYL CITRATE 50 UG/ML
50 INJECTION, SOLUTION INTRAMUSCULAR; INTRAVENOUS
Status: DISCONTINUED | OUTPATIENT
Start: 2017-04-12 | End: 2017-04-12 | Stop reason: HOSPADM

## 2017-04-12 RX ORDER — HEPARIN SODIUM 1000 [USP'U]/ML
INJECTION, SOLUTION INTRAVENOUS; SUBCUTANEOUS PRN
Status: DISCONTINUED | OUTPATIENT
Start: 2017-04-12 | End: 2017-04-12 | Stop reason: SDUPTHER

## 2017-04-12 RX ADMIN — CEFAZOLIN SODIUM 1 G: 1 INJECTION, SOLUTION INTRAVENOUS at 22:14

## 2017-04-12 RX ADMIN — HEPARIN SODIUM 6000 UNITS: 1000 INJECTION, SOLUTION INTRAVENOUS; SUBCUTANEOUS at 08:36

## 2017-04-12 RX ADMIN — ROCURONIUM BROMIDE 30 MG: 10 INJECTION INTRAVENOUS at 07:55

## 2017-04-12 RX ADMIN — ROCURONIUM BROMIDE 20 MG: 10 INJECTION INTRAVENOUS at 08:42

## 2017-04-12 RX ADMIN — CEFAZOLIN SODIUM 1 G: 1 INJECTION, SOLUTION INTRAVENOUS at 15:02

## 2017-04-12 RX ADMIN — FENTANYL CITRATE 50 MCG: 50 INJECTION INTRAMUSCULAR; INTRAVENOUS at 08:16

## 2017-04-12 RX ADMIN — PHENYLEPHRINE HYDROCHLORIDE 200 MCG: 10 INJECTION INTRAVENOUS at 09:14

## 2017-04-12 RX ADMIN — ONDANSETRON HYDROCHLORIDE 4 MG: 2 INJECTION, SOLUTION INTRAVENOUS at 09:34

## 2017-04-12 RX ADMIN — HEPARIN SODIUM 2000 UNITS: 1000 INJECTION, SOLUTION INTRAVENOUS; SUBCUTANEOUS at 09:07

## 2017-04-12 RX ADMIN — HYDROMORPHONE HYDROCHLORIDE 0.5 MG: 1 INJECTION, SOLUTION INTRAMUSCULAR; INTRAVENOUS; SUBCUTANEOUS at 11:13

## 2017-04-12 RX ADMIN — EPHEDRINE SULFATE 15 MG: 50 INJECTION, SOLUTION INTRAMUSCULAR; INTRAVENOUS; SUBCUTANEOUS at 09:40

## 2017-04-12 RX ADMIN — SODIUM CHLORIDE, SODIUM LACTATE, POTASSIUM CHLORIDE, AND CALCIUM CHLORIDE: 600; 310; 30; 20 INJECTION, SOLUTION INTRAVENOUS at 09:56

## 2017-04-12 RX ADMIN — SODIUM CHLORIDE, POTASSIUM CHLORIDE, SODIUM LACTATE AND CALCIUM CHLORIDE: 600; 310; 30; 20 INJECTION, SOLUTION INTRAVENOUS at 07:03

## 2017-04-12 RX ADMIN — LIDOCAINE HYDROCHLORIDE 50 MG: 10 INJECTION, SOLUTION EPIDURAL; INFILTRATION; INTRACAUDAL; PERINEURAL at 07:55

## 2017-04-12 RX ADMIN — SODIUM CHLORIDE, SODIUM LACTATE, POTASSIUM CHLORIDE, AND CALCIUM CHLORIDE: 600; 310; 30; 20 INJECTION, SOLUTION INTRAVENOUS at 08:35

## 2017-04-12 RX ADMIN — EPHEDRINE SULFATE 10 MG: 50 INJECTION, SOLUTION INTRAMUSCULAR; INTRAVENOUS; SUBCUTANEOUS at 09:08

## 2017-04-12 RX ADMIN — EPHEDRINE SULFATE 15 MG: 50 INJECTION, SOLUTION INTRAMUSCULAR; INTRAVENOUS; SUBCUTANEOUS at 08:10

## 2017-04-12 RX ADMIN — SUGAMMADEX 200 MG: 100 INJECTION, SOLUTION INTRAVENOUS at 09:51

## 2017-04-12 RX ADMIN — ASPIRIN 81 MG: 81 TABLET, COATED ORAL at 06:44

## 2017-04-12 RX ADMIN — CHLORASEPTIC 1 SPRAY: 1.5 LIQUID ORAL at 20:23

## 2017-04-12 RX ADMIN — PROPOFOL 150 MG: 10 INJECTION, EMULSION INTRAVENOUS at 07:55

## 2017-04-12 RX ADMIN — SODIUM CHLORIDE: 9 INJECTION, SOLUTION INTRAVENOUS at 12:27

## 2017-04-12 RX ADMIN — LISINOPRIL 20 MG: 20 TABLET ORAL at 14:29

## 2017-04-12 RX ADMIN — HYDROMORPHONE HYDROCHLORIDE 0.5 MG: 1 INJECTION, SOLUTION INTRAMUSCULAR; INTRAVENOUS; SUBCUTANEOUS at 11:45

## 2017-04-12 RX ADMIN — Medication 2 G: at 08:06

## 2017-04-12 RX ADMIN — EPHEDRINE SULFATE 10 MG: 50 INJECTION, SOLUTION INTRAMUSCULAR; INTRAVENOUS; SUBCUTANEOUS at 08:36

## 2017-04-12 RX ADMIN — EPHEDRINE SULFATE 10 MG: 50 INJECTION, SOLUTION INTRAMUSCULAR; INTRAVENOUS; SUBCUTANEOUS at 08:53

## 2017-04-12 RX ADMIN — EPHEDRINE SULFATE 10 MG: 50 INJECTION, SOLUTION INTRAMUSCULAR; INTRAVENOUS; SUBCUTANEOUS at 08:27

## 2017-04-12 RX ADMIN — FENTANYL CITRATE 50 MCG: 50 INJECTION INTRAMUSCULAR; INTRAVENOUS at 07:54

## 2017-04-12 RX ADMIN — EPHEDRINE SULFATE 5 MG: 50 INJECTION, SOLUTION INTRAMUSCULAR; INTRAVENOUS; SUBCUTANEOUS at 09:06

## 2017-04-12 RX ADMIN — PHENYLEPHRINE HYDROCHLORIDE 200 MCG: 10 INJECTION INTRAVENOUS at 09:25

## 2017-04-12 RX ADMIN — ATORVASTATIN CALCIUM 40 MG: 40 TABLET, FILM COATED ORAL at 22:11

## 2017-04-12 RX ADMIN — PROTAMINE SULFATE 30 MG: 10 INJECTION, SOLUTION INTRAVENOUS at 09:54

## 2017-04-12 RX ADMIN — SODIUM CHLORIDE, SODIUM LACTATE, POTASSIUM CHLORIDE, AND CALCIUM CHLORIDE: 600; 310; 30; 20 INJECTION, SOLUTION INTRAVENOUS at 07:03

## 2017-04-12 RX ADMIN — DEXAMETHASONE SODIUM PHOSPHATE 8 MG: 10 INJECTION INTRAMUSCULAR; INTRAVENOUS at 08:12

## 2017-04-12 RX ADMIN — MIDAZOLAM 2 MG: 1 INJECTION INTRAMUSCULAR; INTRAVENOUS at 07:13

## 2017-04-12 RX ADMIN — Medication 10 ML: at 20:23

## 2017-04-12 RX ADMIN — HYDROMORPHONE HYDROCHLORIDE 0.5 MG: 1 INJECTION, SOLUTION INTRAMUSCULAR; INTRAVENOUS; SUBCUTANEOUS at 19:18

## 2017-04-12 ASSESSMENT — PAIN SCALES - GENERAL
PAINLEVEL_OUTOF10: 7
PAINLEVEL_OUTOF10: 3
PAINLEVEL_OUTOF10: 5
PAINLEVEL_OUTOF10: 5
PAINLEVEL_OUTOF10: 6
PAINLEVEL_OUTOF10: 0

## 2017-04-12 ASSESSMENT — PAIN - FUNCTIONAL ASSESSMENT: PAIN_FUNCTIONAL_ASSESSMENT: 0-10

## 2017-04-12 ASSESSMENT — PAIN DESCRIPTION - ORIENTATION: ORIENTATION: LEFT

## 2017-04-12 ASSESSMENT — PAIN DESCRIPTION - LOCATION: LOCATION: INCISION;NECK

## 2017-04-13 LAB
GLUCOSE BLD-MCNC: 111 MG/DL (ref 70–99)
PERFORMED ON: ABNORMAL

## 2017-04-13 PROCEDURE — 1210000000 HC MED SURG R&B

## 2017-04-13 PROCEDURE — 82948 REAGENT STRIP/BLOOD GLUCOSE: CPT

## 2017-04-13 PROCEDURE — 2580000003 HC RX 258: Performed by: SURGERY

## 2017-04-13 PROCEDURE — 6360000002 HC RX W HCPCS: Performed by: SURGERY

## 2017-04-13 PROCEDURE — 6370000000 HC RX 637 (ALT 250 FOR IP): Performed by: SURGERY

## 2017-04-13 RX ADMIN — HYDROMORPHONE HYDROCHLORIDE 0.5 MG: 1 INJECTION, SOLUTION INTRAMUSCULAR; INTRAVENOUS; SUBCUTANEOUS at 20:54

## 2017-04-13 RX ADMIN — METOPROLOL TARTRATE 25 MG: 25 TABLET ORAL at 23:54

## 2017-04-13 RX ADMIN — HYDROMORPHONE HYDROCHLORIDE 0.5 MG: 1 INJECTION, SOLUTION INTRAMUSCULAR; INTRAVENOUS; SUBCUTANEOUS at 03:19

## 2017-04-13 RX ADMIN — LEVOTHYROXINE SODIUM 125 MCG: 125 TABLET ORAL at 06:41

## 2017-04-13 RX ADMIN — ATENOLOL 25 MG: 25 TABLET ORAL at 08:33

## 2017-04-13 RX ADMIN — Medication 10 ML: at 20:06

## 2017-04-13 RX ADMIN — HYDROMORPHONE HYDROCHLORIDE 0.5 MG: 1 INJECTION, SOLUTION INTRAMUSCULAR; INTRAVENOUS; SUBCUTANEOUS at 08:48

## 2017-04-13 RX ADMIN — ASPIRIN 325 MG ORAL TABLET 325 MG: 325 PILL ORAL at 08:33

## 2017-04-13 RX ADMIN — LISINOPRIL 20 MG: 20 TABLET ORAL at 08:33

## 2017-04-13 RX ADMIN — AMLODIPINE BESYLATE 5 MG: 5 TABLET ORAL at 08:33

## 2017-04-13 RX ADMIN — ATORVASTATIN CALCIUM 40 MG: 40 TABLET, FILM COATED ORAL at 20:06

## 2017-04-13 RX ADMIN — Medication 10 ML: at 09:00

## 2017-04-13 ASSESSMENT — PAIN SCALES - GENERAL
PAINLEVEL_OUTOF10: 8
PAINLEVEL_OUTOF10: 4
PAINLEVEL_OUTOF10: 4
PAINLEVEL_OUTOF10: 8
PAINLEVEL_OUTOF10: 4

## 2017-04-14 VITALS
BODY MASS INDEX: 30.48 KG/M2 | HEART RATE: 65 BPM | HEIGHT: 72 IN | TEMPERATURE: 97.3 F | DIASTOLIC BLOOD PRESSURE: 73 MMHG | WEIGHT: 225 LBS | OXYGEN SATURATION: 100 % | SYSTOLIC BLOOD PRESSURE: 144 MMHG | RESPIRATION RATE: 18 BRPM

## 2017-04-14 PROCEDURE — 2580000003 HC RX 258: Performed by: SURGERY

## 2017-04-14 PROCEDURE — 99024 POSTOP FOLLOW-UP VISIT: CPT | Performed by: SURGERY

## 2017-04-14 PROCEDURE — 6370000000 HC RX 637 (ALT 250 FOR IP): Performed by: SURGERY

## 2017-04-14 RX ORDER — CLOPIDOGREL BISULFATE 75 MG/1
75 TABLET ORAL DAILY
Qty: 30 TABLET | Refills: 3 | Status: ON HOLD
Start: 2017-04-17 | End: 2017-04-17 | Stop reason: HOSPADM

## 2017-04-14 RX ORDER — HYDROCODONE BITARTRATE AND ACETAMINOPHEN 5; 325 MG/1; MG/1
1 TABLET ORAL EVERY 8 HOURS PRN
Qty: 20 TABLET | Refills: 0 | Status: SHIPPED | OUTPATIENT
Start: 2017-04-14 | End: 2017-04-21

## 2017-04-14 RX ADMIN — AMLODIPINE BESYLATE 5 MG: 5 TABLET ORAL at 08:54

## 2017-04-14 RX ADMIN — ATENOLOL 25 MG: 25 TABLET ORAL at 08:54

## 2017-04-14 RX ADMIN — ASPIRIN 325 MG ORAL TABLET 325 MG: 325 PILL ORAL at 08:54

## 2017-04-14 RX ADMIN — LISINOPRIL 20 MG: 20 TABLET ORAL at 08:54

## 2017-04-14 RX ADMIN — ACETAMINOPHEN 650 MG: 325 TABLET, FILM COATED ORAL at 08:56

## 2017-04-14 RX ADMIN — LEVOTHYROXINE SODIUM 125 MCG: 125 TABLET ORAL at 05:38

## 2017-04-14 RX ADMIN — Medication 10 ML: at 08:58

## 2017-04-14 RX ADMIN — CLOPIDOGREL BISULFATE 75 MG: 75 TABLET, FILM COATED ORAL at 08:54

## 2017-04-14 ASSESSMENT — PAIN SCALES - GENERAL: PAINLEVEL_OUTOF10: 6

## 2017-04-15 ENCOUNTER — HOSPITAL ENCOUNTER (INPATIENT)
Age: 72
LOS: 2 days | Discharge: HOME | DRG: 310 | End: 2017-04-17
Attending: EMERGENCY MEDICINE | Admitting: HOSPITALIST
Payer: MEDICARE

## 2017-04-15 DIAGNOSIS — Z48.89 ENCOUNTER FOR POSTOPERATIVE WOUND CHECK: ICD-10-CM

## 2017-04-15 DIAGNOSIS — I48.0 PAROXYSMAL ATRIAL FIBRILLATION WITH RVR (HCC): Primary | ICD-10-CM

## 2017-04-15 LAB
ALBUMIN SERPL-MCNC: 4.2 G/DL (ref 3.5–5.2)
ALP BLD-CCNC: 74 U/L (ref 40–130)
ALT SERPL-CCNC: 22 U/L (ref 5–41)
ANION GAP SERPL CALCULATED.3IONS-SCNC: 14 MMOL/L (ref 7–19)
APTT: 28.1 SEC (ref 26–36.2)
AST SERPL-CCNC: 30 U/L (ref 5–40)
BASOPHILS ABSOLUTE: 0 K/UL (ref 0–0.2)
BASOPHILS RELATIVE PERCENT: 0.2 % (ref 0–1)
BILIRUB SERPL-MCNC: 0.5 MG/DL (ref 0.2–1.2)
BUN BLDV-MCNC: 15 MG/DL (ref 8–23)
CALCIUM SERPL-MCNC: 10 MG/DL (ref 8.8–10.2)
CHLORIDE BLD-SCNC: 102 MMOL/L (ref 98–111)
CO2: 25 MMOL/L (ref 22–29)
CREAT SERPL-MCNC: 1.1 MG/DL (ref 0.5–1.2)
EOSINOPHILS ABSOLUTE: 0.2 K/UL (ref 0–0.6)
EOSINOPHILS RELATIVE PERCENT: 1.8 % (ref 0–5)
GFR NON-AFRICAN AMERICAN: >60
GLOBULIN: 2.7 G/DL
GLUCOSE BLD-MCNC: 121 MG/DL (ref 74–109)
HCT VFR BLD CALC: 39.6 % (ref 42–52)
HEMOGLOBIN: 13.1 G/DL (ref 14–18)
INR BLD: 1 (ref 0.88–1.18)
LYMPHOCYTES ABSOLUTE: 2.1 K/UL (ref 1.1–4.5)
LYMPHOCYTES RELATIVE PERCENT: 23.6 % (ref 20–40)
MAGNESIUM: 2.1 MG/DL (ref 1.6–2.4)
MCH RBC QN AUTO: 34 PG (ref 27–31)
MCHC RBC AUTO-ENTMCNC: 33.1 G/DL (ref 33–37)
MCV RBC AUTO: 102.9 FL (ref 80–94)
MONOCYTES ABSOLUTE: 1.1 K/UL (ref 0–0.9)
MONOCYTES RELATIVE PERCENT: 11.8 % (ref 0–10)
NEUTROPHILS ABSOLUTE: 5.6 K/UL (ref 1.5–7.5)
NEUTROPHILS RELATIVE PERCENT: 62.5 % (ref 50–65)
PDW BLD-RTO: 13.3 % (ref 11.5–14.5)
PERFORMED ON: NORMAL
PHOSPHORUS: 3.4 MG/DL (ref 2.5–4.5)
PLATELET # BLD: 204 K/UL (ref 130–400)
PMV BLD AUTO: 12.5 FL (ref 7.4–10.4)
POC TROPONIN I: 0 NG/ML (ref 0–0.08)
POTASSIUM SERPL-SCNC: 3.9 MMOL/L (ref 3.5–5)
PROTHROMBIN TIME: 13.2 SEC (ref 12–14.6)
RBC # BLD: 3.85 M/UL (ref 4.7–6.1)
SODIUM BLD-SCNC: 141 MMOL/L (ref 136–145)
TOTAL PROTEIN: 6.9 G/DL (ref 6.6–8.7)
TROPONIN: <0.01 NG/ML (ref 0–0.03)
WBC # BLD: 9 K/UL (ref 4.8–10.8)

## 2017-04-15 PROCEDURE — 84100 ASSAY OF PHOSPHORUS: CPT

## 2017-04-15 PROCEDURE — 99284 EMERGENCY DEPT VISIT MOD MDM: CPT | Performed by: EMERGENCY MEDICINE

## 2017-04-15 PROCEDURE — 6370000000 HC RX 637 (ALT 250 FOR IP): Performed by: HOSPITALIST

## 2017-04-15 PROCEDURE — 93005 ELECTROCARDIOGRAM TRACING: CPT

## 2017-04-15 PROCEDURE — 85025 COMPLETE CBC W/AUTO DIFF WBC: CPT

## 2017-04-15 PROCEDURE — 2580000003 HC RX 258: Performed by: PHYSICIAN ASSISTANT

## 2017-04-15 PROCEDURE — 85610 PROTHROMBIN TIME: CPT

## 2017-04-15 PROCEDURE — 80053 COMPREHEN METABOLIC PANEL: CPT

## 2017-04-15 PROCEDURE — 84484 ASSAY OF TROPONIN QUANT: CPT

## 2017-04-15 PROCEDURE — 99285 EMERGENCY DEPT VISIT HI MDM: CPT

## 2017-04-15 PROCEDURE — 85730 THROMBOPLASTIN TIME PARTIAL: CPT

## 2017-04-15 PROCEDURE — 83735 ASSAY OF MAGNESIUM: CPT

## 2017-04-15 PROCEDURE — 2140000000 HC CCU INTERMEDIATE R&B

## 2017-04-15 PROCEDURE — 99223 1ST HOSP IP/OBS HIGH 75: CPT | Performed by: HOSPITALIST

## 2017-04-15 PROCEDURE — 36415 COLL VENOUS BLD VENIPUNCTURE: CPT

## 2017-04-15 RX ORDER — ASPIRIN 325 MG
325 TABLET ORAL NIGHTLY
Status: DISCONTINUED | OUTPATIENT
Start: 2017-04-15 | End: 2017-04-17 | Stop reason: HOSPADM

## 2017-04-15 RX ORDER — ACETAMINOPHEN 325 MG/1
650 TABLET ORAL EVERY 4 HOURS PRN
Status: DISCONTINUED | OUTPATIENT
Start: 2017-04-15 | End: 2017-04-17 | Stop reason: HOSPADM

## 2017-04-15 RX ORDER — LEVOTHYROXINE SODIUM 0.12 MG/1
125 TABLET ORAL DAILY
Status: DISCONTINUED | OUTPATIENT
Start: 2017-04-15 | End: 2017-04-17 | Stop reason: HOSPADM

## 2017-04-15 RX ORDER — METOPROLOL TARTRATE 5 MG/5ML
5 INJECTION INTRAVENOUS EVERY 6 HOURS PRN
Status: DISCONTINUED | OUTPATIENT
Start: 2017-04-15 | End: 2017-04-17 | Stop reason: HOSPADM

## 2017-04-15 RX ORDER — LISINOPRIL 20 MG/1
20 TABLET ORAL DAILY
Status: DISCONTINUED | OUTPATIENT
Start: 2017-04-16 | End: 2017-04-17 | Stop reason: HOSPADM

## 2017-04-15 RX ORDER — SODIUM CHLORIDE 0.9 % (FLUSH) 0.9 %
10 SYRINGE (ML) INJECTION PRN
Status: DISCONTINUED | OUTPATIENT
Start: 2017-04-15 | End: 2017-04-17 | Stop reason: HOSPADM

## 2017-04-15 RX ORDER — ONDANSETRON 2 MG/ML
4 INJECTION INTRAMUSCULAR; INTRAVENOUS EVERY 6 HOURS PRN
Status: DISCONTINUED | OUTPATIENT
Start: 2017-04-15 | End: 2017-04-17 | Stop reason: HOSPADM

## 2017-04-15 RX ORDER — CLOPIDOGREL BISULFATE 75 MG/1
75 TABLET ORAL DAILY
Status: DISCONTINUED | OUTPATIENT
Start: 2017-04-17 | End: 2017-04-16

## 2017-04-15 RX ORDER — HYDROCODONE BITARTRATE AND ACETAMINOPHEN 5; 325 MG/1; MG/1
1 TABLET ORAL EVERY 8 HOURS PRN
Status: DISCONTINUED | OUTPATIENT
Start: 2017-04-15 | End: 2017-04-17 | Stop reason: HOSPADM

## 2017-04-15 RX ORDER — ATORVASTATIN CALCIUM 40 MG/1
40 TABLET, FILM COATED ORAL NIGHTLY
Status: DISCONTINUED | OUTPATIENT
Start: 2017-04-15 | End: 2017-04-17 | Stop reason: HOSPADM

## 2017-04-15 RX ORDER — ASPIRIN 325 MG
325 TABLET ORAL DAILY
Status: DISCONTINUED | OUTPATIENT
Start: 2017-04-15 | End: 2017-04-15

## 2017-04-15 RX ORDER — ATENOLOL 25 MG/1
25 TABLET ORAL DAILY
Status: DISCONTINUED | OUTPATIENT
Start: 2017-04-15 | End: 2017-04-17 | Stop reason: HOSPADM

## 2017-04-15 RX ORDER — AMLODIPINE BESYLATE 5 MG/1
5 TABLET ORAL DAILY
Status: DISCONTINUED | OUTPATIENT
Start: 2017-04-15 | End: 2017-04-17 | Stop reason: HOSPADM

## 2017-04-15 RX ORDER — SODIUM CHLORIDE 0.9 % (FLUSH) 0.9 %
10 SYRINGE (ML) INJECTION EVERY 12 HOURS SCHEDULED
Status: DISCONTINUED | OUTPATIENT
Start: 2017-04-15 | End: 2017-04-17 | Stop reason: HOSPADM

## 2017-04-15 RX ORDER — NITROGLYCERIN 0.4 MG/1
0.4 TABLET SUBLINGUAL EVERY 5 MIN PRN
Status: DISCONTINUED | OUTPATIENT
Start: 2017-04-15 | End: 2017-04-17 | Stop reason: HOSPADM

## 2017-04-15 RX ADMIN — Medication 10 ML: at 20:55

## 2017-04-15 RX ADMIN — ASPIRIN 325 MG ORAL TABLET 325 MG: 325 PILL ORAL at 20:54

## 2017-04-15 RX ADMIN — ATORVASTATIN CALCIUM 40 MG: 40 TABLET, FILM COATED ORAL at 20:54

## 2017-04-15 RX ADMIN — HYDROCODONE BITARTRATE AND ACETAMINOPHEN 1 TABLET: 5; 325 TABLET ORAL at 18:00

## 2017-04-15 ASSESSMENT — ENCOUNTER SYMPTOMS
ABDOMINAL PAIN: 0
BACK PAIN: 0
COUGH: 0
CHEST TIGHTNESS: 0
EYE PAIN: 0
SPUTUM PRODUCTION: 0
ORTHOPNEA: 0
WHEEZING: 0
SHORTNESS OF BREATH: 0
VOMITING: 0
VOICE CHANGE: 1
SORE THROAT: 1
DIARRHEA: 0
DOUBLE VISION: 0
BLURRED VISION: 0
NAUSEA: 0
CONSTIPATION: 0
HEARTBURN: 0
STRIDOR: 0

## 2017-04-15 ASSESSMENT — PAIN SCALES - GENERAL
PAINLEVEL_OUTOF10: 4
PAINLEVEL_OUTOF10: 6
PAINLEVEL_OUTOF10: 2

## 2017-04-16 LAB
ANION GAP SERPL CALCULATED.3IONS-SCNC: 14 MMOL/L (ref 7–19)
BUN BLDV-MCNC: 17 MG/DL (ref 8–23)
CALCIUM SERPL-MCNC: 9.4 MG/DL (ref 8.8–10.2)
CHLORIDE BLD-SCNC: 104 MMOL/L (ref 98–111)
CO2: 24 MMOL/L (ref 22–29)
CREAT SERPL-MCNC: 1.1 MG/DL (ref 0.5–1.2)
FERRITIN: 154.9 NG/ML (ref 30–400)
FOLATE: 19.3 NG/ML (ref 4.5–32.2)
GFR NON-AFRICAN AMERICAN: >60
GLUCOSE BLD-MCNC: 93 MG/DL (ref 74–109)
HCT VFR BLD CALC: 36.5 % (ref 42–52)
HEMOGLOBIN: 12 G/DL (ref 14–18)
IRON SATURATION: 30 % (ref 14–50)
IRON: 62 UG/DL (ref 59–158)
LV EF: 58 %
LVEF MODALITY: NORMAL
MAGNESIUM: 1.9 MG/DL (ref 1.6–2.4)
MCH RBC QN AUTO: 34.1 PG (ref 27–31)
MCHC RBC AUTO-ENTMCNC: 32.9 G/DL (ref 33–37)
MCV RBC AUTO: 103.7 FL (ref 80–94)
PDW BLD-RTO: 13.6 % (ref 11.5–14.5)
PLATELET # BLD: 177 K/UL (ref 130–400)
PMV BLD AUTO: 12.2 FL (ref 7.4–10.4)
POTASSIUM SERPL-SCNC: 3.8 MMOL/L (ref 3.5–5)
RBC # BLD: 3.52 M/UL (ref 4.7–6.1)
RETICULOCYTE ABSOLUTE COUNT: 0.06 M/UL (ref 0.03–0.12)
RETICULOCYTE COUNT PCT: 1.65 % (ref 0.5–1.5)
S PYO AG THROAT QL: NEGATIVE
SODIUM BLD-SCNC: 142 MMOL/L (ref 136–145)
TOTAL IRON BINDING CAPACITY: 209 UG/DL (ref 250–400)
TSH SERPL DL<=0.05 MIU/L-ACNC: 0.57 UIU/ML (ref 0.27–4.2)
VITAMIN B-12: 991 PG/ML (ref 211–946)
WBC # BLD: 8.3 K/UL (ref 4.8–10.8)

## 2017-04-16 PROCEDURE — 93306 TTE W/DOPPLER COMPLETE: CPT

## 2017-04-16 PROCEDURE — 6370000000 HC RX 637 (ALT 250 FOR IP): Performed by: HOSPITALIST

## 2017-04-16 PROCEDURE — 83540 ASSAY OF IRON: CPT

## 2017-04-16 PROCEDURE — 83550 IRON BINDING TEST: CPT

## 2017-04-16 PROCEDURE — 85027 COMPLETE CBC AUTOMATED: CPT

## 2017-04-16 PROCEDURE — 6370000000 HC RX 637 (ALT 250 FOR IP): Performed by: SURGERY

## 2017-04-16 PROCEDURE — 87880 STREP A ASSAY W/OPTIC: CPT

## 2017-04-16 PROCEDURE — 82607 VITAMIN B-12: CPT

## 2017-04-16 PROCEDURE — 2140000000 HC CCU INTERMEDIATE R&B

## 2017-04-16 PROCEDURE — 80048 BASIC METABOLIC PNL TOTAL CA: CPT

## 2017-04-16 PROCEDURE — 84443 ASSAY THYROID STIM HORMONE: CPT

## 2017-04-16 PROCEDURE — 85045 AUTOMATED RETICULOCYTE COUNT: CPT

## 2017-04-16 PROCEDURE — 82728 ASSAY OF FERRITIN: CPT

## 2017-04-16 PROCEDURE — 2580000003 HC RX 258: Performed by: PHYSICIAN ASSISTANT

## 2017-04-16 PROCEDURE — 82746 ASSAY OF FOLIC ACID SERUM: CPT

## 2017-04-16 PROCEDURE — 87081 CULTURE SCREEN ONLY: CPT

## 2017-04-16 PROCEDURE — 99232 SBSQ HOSP IP/OBS MODERATE 35: CPT | Performed by: HOSPITALIST

## 2017-04-16 PROCEDURE — 36415 COLL VENOUS BLD VENIPUNCTURE: CPT

## 2017-04-16 PROCEDURE — 83735 ASSAY OF MAGNESIUM: CPT

## 2017-04-16 RX ADMIN — Medication 10 ML: at 08:34

## 2017-04-16 RX ADMIN — LISINOPRIL 20 MG: 20 TABLET ORAL at 08:33

## 2017-04-16 RX ADMIN — ATORVASTATIN CALCIUM 40 MG: 40 TABLET, FILM COATED ORAL at 20:49

## 2017-04-16 RX ADMIN — HYDROCODONE BITARTRATE AND ACETAMINOPHEN 1 TABLET: 5; 325 TABLET ORAL at 20:48

## 2017-04-16 RX ADMIN — AMLODIPINE BESYLATE 5 MG: 5 TABLET ORAL at 08:33

## 2017-04-16 RX ADMIN — LEVOTHYROXINE SODIUM 125 MCG: 125 TABLET ORAL at 06:06

## 2017-04-16 RX ADMIN — ATENOLOL 25 MG: 25 TABLET ORAL at 08:33

## 2017-04-16 RX ADMIN — Medication 10 ML: at 20:50

## 2017-04-16 RX ADMIN — ASPIRIN 325 MG ORAL TABLET 325 MG: 325 PILL ORAL at 20:49

## 2017-04-16 RX ADMIN — CHLORASEPTIC 1 SPRAY: 1.5 LIQUID ORAL at 16:00

## 2017-04-16 RX ADMIN — HYDROCODONE BITARTRATE AND ACETAMINOPHEN 1 TABLET: 5; 325 TABLET ORAL at 12:43

## 2017-04-16 RX ADMIN — HYDROCODONE BITARTRATE AND ACETAMINOPHEN 1 TABLET: 5; 325 TABLET ORAL at 02:04

## 2017-04-16 ASSESSMENT — PAIN SCALES - GENERAL
PAINLEVEL_OUTOF10: 0
PAINLEVEL_OUTOF10: 7
PAINLEVEL_OUTOF10: 3
PAINLEVEL_OUTOF10: 7
PAINLEVEL_OUTOF10: 1
PAINLEVEL_OUTOF10: 6

## 2017-04-16 ASSESSMENT — ENCOUNTER SYMPTOMS
DOUBLE VISION: 0
COUGH: 0
WHEEZING: 0
EYE REDNESS: 0
DIARRHEA: 0
ABDOMINAL PAIN: 0
EYE PAIN: 0
NAUSEA: 0
ORTHOPNEA: 0
CONSTIPATION: 0
BLURRED VISION: 0
SORE THROAT: 1
SPUTUM PRODUCTION: 0
HEARTBURN: 0
STRIDOR: 0
BACK PAIN: 0
VOMITING: 0

## 2017-04-17 VITALS
TEMPERATURE: 97.1 F | WEIGHT: 219.4 LBS | RESPIRATION RATE: 18 BRPM | DIASTOLIC BLOOD PRESSURE: 74 MMHG | HEART RATE: 64 BPM | OXYGEN SATURATION: 96 % | SYSTOLIC BLOOD PRESSURE: 129 MMHG | HEIGHT: 72 IN | BODY MASS INDEX: 29.72 KG/M2

## 2017-04-17 LAB
ANION GAP SERPL CALCULATED.3IONS-SCNC: 15 MMOL/L (ref 7–19)
BUN BLDV-MCNC: 20 MG/DL (ref 8–23)
CALCIUM SERPL-MCNC: 9 MG/DL (ref 8.8–10.2)
CHLORIDE BLD-SCNC: 99 MMOL/L (ref 98–111)
CO2: 24 MMOL/L (ref 22–29)
CREAT SERPL-MCNC: 1.3 MG/DL (ref 0.5–1.2)
EKG P AXIS: -97 DEGREES
EKG P AXIS: NORMAL DEGREES
EKG P AXIS: NORMAL DEGREES
EKG P-R INTERVAL: 252 MS
EKG P-R INTERVAL: NORMAL MS
EKG P-R INTERVAL: NORMAL MS
EKG Q-T INTERVAL: 282 MS
EKG Q-T INTERVAL: 334 MS
EKG Q-T INTERVAL: 342 MS
EKG QRS DURATION: 112 MS
EKG QRS DURATION: 112 MS
EKG QRS DURATION: 132 MS
EKG QTC CALCULATION (BAZETT): 388 MS
EKG QTC CALCULATION (BAZETT): 394 MS
EKG QTC CALCULATION (BAZETT): 416 MS
EKG T AXIS: -11 DEGREES
EKG T AXIS: -2 DEGREES
EKG T AXIS: 29 DEGREES
GFR NON-AFRICAN AMERICAN: 54
GLUCOSE BLD-MCNC: 105 MG/DL (ref 74–109)
HCT VFR BLD CALC: 36.2 % (ref 42–52)
HEMOGLOBIN: 12.1 G/DL (ref 14–18)
MCH RBC QN AUTO: 34.3 PG (ref 27–31)
MCHC RBC AUTO-ENTMCNC: 33.4 G/DL (ref 33–37)
MCV RBC AUTO: 102.5 FL (ref 80–94)
PDW BLD-RTO: 13.3 % (ref 11.5–14.5)
PLATELET # BLD: 190 K/UL (ref 130–400)
PMV BLD AUTO: 12.2 FL (ref 7.4–10.4)
POTASSIUM SERPL-SCNC: 3.6 MMOL/L (ref 3.5–5)
RBC # BLD: 3.53 M/UL (ref 4.7–6.1)
SODIUM BLD-SCNC: 138 MMOL/L (ref 136–145)
WBC # BLD: 8.5 K/UL (ref 4.8–10.8)

## 2017-04-17 PROCEDURE — 36415 COLL VENOUS BLD VENIPUNCTURE: CPT

## 2017-04-17 PROCEDURE — 2580000003 HC RX 258: Performed by: PHYSICIAN ASSISTANT

## 2017-04-17 PROCEDURE — 85027 COMPLETE CBC AUTOMATED: CPT

## 2017-04-17 PROCEDURE — 99999 PR OFFICE/OUTPT VISIT,PROCEDURE ONLY: CPT | Performed by: PHYSICIAN ASSISTANT

## 2017-04-17 PROCEDURE — 99024 POSTOP FOLLOW-UP VISIT: CPT | Performed by: SURGERY

## 2017-04-17 PROCEDURE — 6370000000 HC RX 637 (ALT 250 FOR IP): Performed by: HOSPITALIST

## 2017-04-17 PROCEDURE — 99238 HOSP IP/OBS DSCHRG MGMT 30/<: CPT | Performed by: PHYSICIAN ASSISTANT

## 2017-04-17 PROCEDURE — 80048 BASIC METABOLIC PNL TOTAL CA: CPT

## 2017-04-17 RX ADMIN — AMLODIPINE BESYLATE 5 MG: 5 TABLET ORAL at 10:45

## 2017-04-17 RX ADMIN — ATENOLOL 25 MG: 25 TABLET ORAL at 10:45

## 2017-04-17 RX ADMIN — Medication 10 ML: at 10:46

## 2017-04-17 RX ADMIN — HYDROCODONE BITARTRATE AND ACETAMINOPHEN 1 TABLET: 5; 325 TABLET ORAL at 14:58

## 2017-04-17 RX ADMIN — LISINOPRIL 20 MG: 20 TABLET ORAL at 10:45

## 2017-04-17 RX ADMIN — HYDROCODONE BITARTRATE AND ACETAMINOPHEN 1 TABLET: 5; 325 TABLET ORAL at 05:55

## 2017-04-17 RX ADMIN — LEVOTHYROXINE SODIUM 125 MCG: 125 TABLET ORAL at 05:55

## 2017-04-17 ASSESSMENT — ENCOUNTER SYMPTOMS
VOMITING: 0
ORTHOPNEA: 0
EYE PAIN: 0
EYE REDNESS: 0
DOUBLE VISION: 0
BACK PAIN: 0
CONSTIPATION: 0
COUGH: 0
BLURRED VISION: 0
STRIDOR: 0
SPUTUM PRODUCTION: 0
HEARTBURN: 0
NAUSEA: 0
WHEEZING: 0
ABDOMINAL PAIN: 0
DIARRHEA: 0
SORE THROAT: 1

## 2017-04-17 ASSESSMENT — PAIN SCALES - GENERAL
PAINLEVEL_OUTOF10: 0
PAINLEVEL_OUTOF10: 5
PAINLEVEL_OUTOF10: 7

## 2017-04-18 ENCOUNTER — OFFICE VISIT (OUTPATIENT)
Dept: CARDIOLOGY | Facility: CLINIC | Age: 72
End: 2017-04-18

## 2017-04-18 VITALS
WEIGHT: 221 LBS | HEART RATE: 62 BPM | HEIGHT: 72 IN | SYSTOLIC BLOOD PRESSURE: 150 MMHG | DIASTOLIC BLOOD PRESSURE: 80 MMHG | BODY MASS INDEX: 29.93 KG/M2

## 2017-04-18 DIAGNOSIS — Z95.5 STENTED CORONARY ARTERY: ICD-10-CM

## 2017-04-18 DIAGNOSIS — I10 ESSENTIAL HYPERTENSION: ICD-10-CM

## 2017-04-18 DIAGNOSIS — R00.2 PALPITATION: ICD-10-CM

## 2017-04-18 DIAGNOSIS — I25.10 CORONARY ARTERY DISEASE INVOLVING NATIVE CORONARY ARTERY OF NATIVE HEART WITHOUT ANGINA PECTORIS: ICD-10-CM

## 2017-04-18 DIAGNOSIS — E78.2 MIXED HYPERLIPIDEMIA: Primary | ICD-10-CM

## 2017-04-18 LAB — S PYO THROAT QL CULT: NORMAL

## 2017-04-18 PROCEDURE — 99214 OFFICE O/P EST MOD 30 MIN: CPT | Performed by: INTERNAL MEDICINE

## 2017-04-18 PROCEDURE — 93000 ELECTROCARDIOGRAM COMPLETE: CPT | Performed by: INTERNAL MEDICINE

## 2017-04-18 RX ORDER — HYDROCODONE BITARTRATE AND ACETAMINOPHEN 5; 325 MG/1; MG/1
TABLET ORAL EVERY 8 HOURS
COMMUNITY
Start: 2017-04-14 | End: 2017-04-21

## 2017-04-18 NOTE — PROGRESS NOTES
Robin Herrera  [id number]  1945  71 y.o.  male    Referring Provider: Wilber Rivera Jr., MD    Reason for Follow-up Visit: Recent left carotid endarterectomy  Recent visit to [facility] 4 days back after surgery  Told to have an arrhythmia  No ECG available  No palpitations  Just felt shaky   Doing well No chest pain or shortness of breath  Fairly active    History of present illness:  Robin Herrera is a 71 y.o. yo male with history of CAD who presents today for   Chief Complaint   Patient presents with   • Atrial Fibrillation     D/C FROM [facility]EST[facility]    .    History  Past Medical History:   Diagnosis Date   • AAA (abdominal aortic aneurysm)    • Atrial fibrillation    • CAD in native artery    • Headache    • Hypertension    ,   Past Surgical History:   Procedure Laterality Date   • CARDIAC CATHETERIZATION     • CAROTID STENT  2010    X 1   • EYE SURGERY Bilateral     cataracts    • PROSTATECTOMY     • TONSILLECTOMY     ,   Family History   Problem Relation Age of Onset   • Alzheimer's disease Father    • Kidney failure Father    • Coronary artery disease Neg Hx    ,   Social History   Substance Use Topics   • Smoking status: Former Smoker     Packs/day: 0.50     Types: Cigarettes   • Smokeless tobacco: Never Used      Comment: 03/2017   • Alcohol use Yes      Comment: MODERATE DRINKS BEER   ,     Medications  Current Outpatient Prescriptions   Medication Sig Dispense Refill   • amLODIPine (NORVASC) 5 MG tablet Take 5 mg by mouth daily.     • aspirin 325 MG tablet Take 325 mg by mouth daily.     • atenolol (TENORMIN) 50 MG tablet Take 25 mg by mouth daily.     • atorvastatin (LIPITOR) 40 MG tablet Take 40 mg by mouth Daily.     • cholecalciferol (VITAMIN D3) 1000 UNITS tablet Take 1,000 Units by mouth daily.     • coenzyme Q10 100 MG capsule Take 100 mg by mouth daily.     • HYDROcodone-acetaminophen (NORCO) 5-325 MG per tablet Take  by mouth Every 8 (Eight) Hours.     • levothyroxine  "(SYNTHROID, LEVOTHROID) 137 MCG tablet Take 0.125 mcg by mouth Daily.     • lisinopril (PRINIVIL,ZESTRIL) 20 MG tablet Take 20 mg by mouth daily.     • Multiple Vitamins-Minerals (MULTIVITAMIN ADULT PO) Take  by mouth.     • nicotine polacrilex (NICORETTE) 4 MG gum Chew 4 mg As Needed for smoking cessation.     • nitroglycerin (NITROSTAT) 0.4 MG SL tablet Place 0.4 mg under the tongue every 5 (five) minutes as needed for chest pain. Take no more than 3 doses in 15 minutes.       No current facility-administered medications for this visit.        Allergies:  Pollen extract    Review of Systems  Review of Systems   Constitution: Negative.   HENT: Negative.    Eyes: Negative.    Cardiovascular: Negative for chest pain, claudication, cyanosis, dyspnea on exertion, irregular heartbeat, leg swelling, near-syncope, orthopnea, palpitations, paroxysmal nocturnal dyspnea and syncope.   Respiratory: Negative.    Endocrine: Negative.    Hematologic/Lymphatic: Negative.    Skin: Negative.    Gastrointestinal: Negative for anorexia.   Genitourinary: Negative.    Neurological: Negative.    Psychiatric/Behavioral: Negative.        Objective     Physical Exam:  /80  Pulse 62  Ht 72\" (182.9 cm)  Wt 221 lb (100 kg)  BMI 29.97 kg/m2  Physical Exam   Constitutional: He appears well-developed.   HENT:   Head: Normocephalic.   Neck: Normal carotid pulses and no JVD present. No tracheal tenderness present. Carotid bruit is not present. No tracheal deviation and no edema present.   Cardiovascular: Regular rhythm, normal heart sounds and normal pulses.    Pulmonary/Chest: Effort normal. No stridor.   Abdominal: Soft.   Neurological: He is alert. He has normal strength. No cranial nerve deficit or sensory deficit.   Skin: Skin is warm.   Psychiatric: He has a normal mood and affect. His speech is normal and behavior is normal.       Results Review:       ECG 12 Lead  Date/Time: 4/18/2017 11:43 AM  Performed by: NITESH" STEPHANIE  Authorized by: STEPHANIE DOWLING   Comparison: not compared with previous ECG   Rhythm: sinus rhythm  Rate: normal  QRS axis: normal  Comments: Non specific IVCD            Assessment/Plan   Patient Active Problem List   Diagnosis   • Hyperlipemia   • Essential hypertension   • Coronary artery disease involving native coronary artery of native heart without angina pectoris   • Stented coronary artery   • Atrial fibrillation   Carotid stenosis recent eft carotid endarterectomy  Arrhythmia        Stable doing well. No chest pain or excessive dyspnea. No palpitations. No significant pedal edema. Compliant with medications and diet. Latest labs and medications reviewed.  Stress nuclear 2016 normal    Plan:    Holter  Keep follow up with me in 3 weeks    Close follow up with you as scheduled.  Intensive risk factor modifications  See order list.       Return in about 3 weeks (around 5/9/2017).

## 2017-04-20 ENCOUNTER — TELEPHONE (OUTPATIENT)
Dept: CARDIOLOGY | Facility: CLINIC | Age: 72
End: 2017-04-20

## 2017-04-20 ENCOUNTER — OFFICE VISIT (OUTPATIENT)
Dept: VASCULAR SURGERY | Age: 72
End: 2017-04-20

## 2017-04-20 VITALS
TEMPERATURE: 96.9 F | DIASTOLIC BLOOD PRESSURE: 77 MMHG | SYSTOLIC BLOOD PRESSURE: 139 MMHG | RESPIRATION RATE: 18 BRPM | HEART RATE: 61 BPM

## 2017-04-20 DIAGNOSIS — I65.23 BILATERAL CAROTID ARTERY STENOSIS: Primary | ICD-10-CM

## 2017-04-20 PROCEDURE — 99024 POSTOP FOLLOW-UP VISIT: CPT | Performed by: NURSE PRACTITIONER

## 2017-04-20 NOTE — TELEPHONE ENCOUNTER
PT WAS JUST SEEN FOR F/U ON ABN EKG FROM [facility]. WE DID NOT HAVE THIS AT THE TIME OF VISIT.    NOW IN CHART FOR REVIEW - UNDER MEDIA TAB DATED 4/15/17

## 2017-04-22 NOTE — TELEPHONE ENCOUNTER
As atrial fibrillation and atrial flutter needs to start anticoagulation once neck wound from carotid endarterectomy has healed  We need to ask his surgeon when we can start this

## 2017-04-26 NOTE — TELEPHONE ENCOUNTER
PT TO SEE DR FLORES(VASCULAR) @ [facility] TOMORROW TO F/U - HE WILL ASK WHEN IT IS OKAY TO START ANTICOAGULATION THEN & CALL ME BACK

## 2017-04-27 ENCOUNTER — OFFICE VISIT (OUTPATIENT)
Dept: VASCULAR SURGERY | Age: 72
End: 2017-04-27

## 2017-04-27 VITALS
HEART RATE: 60 BPM | TEMPERATURE: 96.8 F | DIASTOLIC BLOOD PRESSURE: 80 MMHG | SYSTOLIC BLOOD PRESSURE: 142 MMHG | RESPIRATION RATE: 18 BRPM

## 2017-04-27 DIAGNOSIS — Z01.818 PRE-OP TESTING: Primary | ICD-10-CM

## 2017-04-27 DIAGNOSIS — Z01.818 PRE-OP TESTING: ICD-10-CM

## 2017-04-27 DIAGNOSIS — I65.21 STENOSIS OF RIGHT CAROTID ARTERY: Primary | ICD-10-CM

## 2017-04-27 DIAGNOSIS — I65.23 BILATERAL CAROTID ARTERY STENOSIS: ICD-10-CM

## 2017-04-27 LAB
ANION GAP SERPL CALCULATED.3IONS-SCNC: 18 MMOL/L (ref 7–19)
BUN BLDV-MCNC: 21 MG/DL (ref 8–23)
CALCIUM SERPL-MCNC: 10 MG/DL (ref 8.8–10.2)
CHLORIDE BLD-SCNC: 101 MMOL/L (ref 98–111)
CO2: 21 MMOL/L (ref 22–29)
CREAT SERPL-MCNC: 1.2 MG/DL (ref 0.5–1.2)
GFR NON-AFRICAN AMERICAN: 60
GLUCOSE BLD-MCNC: 99 MG/DL (ref 74–109)
HCT VFR BLD CALC: 38.8 % (ref 42–52)
HEMOGLOBIN: 13.1 G/DL (ref 14–18)
MCH RBC QN AUTO: 34.5 PG (ref 27–31)
MCHC RBC AUTO-ENTMCNC: 33.8 G/DL (ref 33–37)
MCV RBC AUTO: 102.1 FL (ref 80–94)
PDW BLD-RTO: 13.1 % (ref 11.5–14.5)
PLATELET # BLD: 272 K/UL (ref 130–400)
PMV BLD AUTO: 11.6 FL (ref 7.4–10.4)
POTASSIUM SERPL-SCNC: 3.8 MMOL/L (ref 3.5–5)
RBC # BLD: 3.8 M/UL (ref 4.7–6.1)
SODIUM BLD-SCNC: 140 MMOL/L (ref 136–145)
WBC # BLD: 7.5 K/UL (ref 4.8–10.8)

## 2017-04-27 PROCEDURE — 99024 POSTOP FOLLOW-UP VISIT: CPT | Performed by: PHYSICIAN ASSISTANT

## 2017-04-27 RX ORDER — CLOPIDOGREL BISULFATE 75 MG/1
75 TABLET ORAL DAILY
Qty: 30 TABLET | Refills: 0 | Status: SHIPPED | OUTPATIENT
Start: 2017-04-27

## 2017-04-28 ENCOUNTER — TELEPHONE (OUTPATIENT)
Dept: VASCULAR SURGERY | Age: 72
End: 2017-04-28

## 2017-04-28 DIAGNOSIS — I65.21 INTERNAL CAROTID ARTERY STENOSIS, RIGHT: Primary | ICD-10-CM

## 2017-05-02 ENCOUNTER — OFFICE VISIT (OUTPATIENT)
Dept: NEUROLOGY | Age: 72
End: 2017-05-02
Payer: MEDICARE

## 2017-05-02 VITALS
HEART RATE: 64 BPM | BODY MASS INDEX: 29.93 KG/M2 | SYSTOLIC BLOOD PRESSURE: 138 MMHG | DIASTOLIC BLOOD PRESSURE: 78 MMHG | OXYGEN SATURATION: 100 % | WEIGHT: 221 LBS | HEIGHT: 72 IN

## 2017-05-02 DIAGNOSIS — I77.9 CAROTID DISEASE, BILATERAL (HCC): ICD-10-CM

## 2017-05-02 DIAGNOSIS — R41.3 MEMORY LOSS: ICD-10-CM

## 2017-05-02 DIAGNOSIS — R94.02 ABNORMAL BRAIN SCAN: ICD-10-CM

## 2017-05-02 DIAGNOSIS — R41.3 MEMORY LOSS: Primary | ICD-10-CM

## 2017-05-02 DIAGNOSIS — E78.5 HYPERLIPIDEMIA, UNSPECIFIED HYPERLIPIDEMIA TYPE: ICD-10-CM

## 2017-05-02 LAB — VITAMIN B-12: 875 PG/ML (ref 211–946)

## 2017-05-02 PROCEDURE — 99204 OFFICE O/P NEW MOD 45 MIN: CPT | Performed by: PSYCHIATRY & NEUROLOGY

## 2017-05-02 PROCEDURE — 3017F COLORECTAL CA SCREEN DOC REV: CPT | Performed by: PSYCHIATRY & NEUROLOGY

## 2017-05-02 PROCEDURE — 4040F PNEUMOC VAC/ADMIN/RCVD: CPT | Performed by: PSYCHIATRY & NEUROLOGY

## 2017-05-02 PROCEDURE — G8420 CALC BMI NORM PARAMETERS: HCPCS | Performed by: PSYCHIATRY & NEUROLOGY

## 2017-05-02 PROCEDURE — 4004F PT TOBACCO SCREEN RCVD TLK: CPT | Performed by: PSYCHIATRY & NEUROLOGY

## 2017-05-02 PROCEDURE — G8427 DOCREV CUR MEDS BY ELIG CLIN: HCPCS | Performed by: PSYCHIATRY & NEUROLOGY

## 2017-05-02 PROCEDURE — G8598 ASA/ANTIPLAT THER USED: HCPCS | Performed by: PSYCHIATRY & NEUROLOGY

## 2017-05-02 PROCEDURE — 1123F ACP DISCUSS/DSCN MKR DOCD: CPT | Performed by: PSYCHIATRY & NEUROLOGY

## 2017-05-02 PROCEDURE — 1111F DSCHRG MED/CURRENT MED MERGE: CPT | Performed by: PSYCHIATRY & NEUROLOGY

## 2017-05-03 ENCOUNTER — APPOINTMENT (OUTPATIENT)
Dept: CARDIOLOGY | Facility: HOSPITAL | Age: 72
End: 2017-05-03
Attending: INTERNAL MEDICINE

## 2017-05-05 ENCOUNTER — PREP FOR PROCEDURE (OUTPATIENT)
Dept: VASCULAR SURGERY | Age: 72
End: 2017-05-05

## 2017-05-05 LAB — METHYLMALONIC ACID: 0.15 UMOL/L (ref 0–0.4)

## 2017-05-05 RX ORDER — SODIUM CHLORIDE 9 MG/ML
INJECTION, SOLUTION INTRAVENOUS CONTINUOUS
Status: CANCELLED | OUTPATIENT
Start: 2017-05-05

## 2017-05-05 RX ORDER — ASPIRIN 81 MG/1
81 TABLET ORAL ONCE
Status: CANCELLED | OUTPATIENT
Start: 2017-05-05 | End: 2017-05-05

## 2017-05-05 RX ORDER — SODIUM CHLORIDE 0.9 % (FLUSH) 0.9 %
10 SYRINGE (ML) INJECTION PRN
Status: CANCELLED | OUTPATIENT
Start: 2017-05-05

## 2017-05-11 ENCOUNTER — OFFICE VISIT (OUTPATIENT)
Dept: CARDIOLOGY | Facility: CLINIC | Age: 72
End: 2017-05-11

## 2017-05-11 VITALS
BODY MASS INDEX: 29.66 KG/M2 | HEART RATE: 67 BPM | DIASTOLIC BLOOD PRESSURE: 76 MMHG | SYSTOLIC BLOOD PRESSURE: 142 MMHG | HEIGHT: 72 IN | WEIGHT: 219 LBS

## 2017-05-11 DIAGNOSIS — E78.2 MIXED HYPERLIPIDEMIA: Primary | ICD-10-CM

## 2017-05-11 DIAGNOSIS — Z95.5 STENTED CORONARY ARTERY: ICD-10-CM

## 2017-05-11 DIAGNOSIS — I10 ESSENTIAL HYPERTENSION: ICD-10-CM

## 2017-05-11 DIAGNOSIS — I25.10 CORONARY ARTERY DISEASE INVOLVING NATIVE CORONARY ARTERY OF NATIVE HEART WITHOUT ANGINA PECTORIS: ICD-10-CM

## 2017-05-11 DIAGNOSIS — I48.0 PAROXYSMAL ATRIAL FIBRILLATION (HCC): ICD-10-CM

## 2017-05-11 PROCEDURE — 93000 ELECTROCARDIOGRAM COMPLETE: CPT | Performed by: INTERNAL MEDICINE

## 2017-05-11 PROCEDURE — 99214 OFFICE O/P EST MOD 30 MIN: CPT | Performed by: INTERNAL MEDICINE

## 2017-05-11 RX ORDER — CLOPIDOGREL BISULFATE 75 MG/1
TABLET ORAL
COMMUNITY
Start: 2017-04-27 | End: 2017-06-09 | Stop reason: SDUPTHER

## 2017-05-12 ENCOUNTER — TELEPHONE (OUTPATIENT)
Dept: VASCULAR SURGERY | Age: 72
End: 2017-05-12

## 2017-05-22 ENCOUNTER — HOSPITAL ENCOUNTER (INPATIENT)
Dept: INTERVENTIONAL RADIOLOGY/VASCULAR | Age: 72
LOS: 1 days | Discharge: HOME | DRG: 036 | End: 2017-05-23
Attending: SURGERY | Admitting: SURGERY
Payer: MEDICARE

## 2017-05-22 DIAGNOSIS — I65.29 STENOSIS OF CAROTID ARTERY, UNSPECIFIED LATERALITY: ICD-10-CM

## 2017-05-22 LAB
ANION GAP SERPL CALCULATED.3IONS-SCNC: 14 MMOL/L (ref 7–19)
BUN BLDV-MCNC: 20 MG/DL (ref 8–23)
CALCIUM SERPL-MCNC: 9.8 MG/DL (ref 8.8–10.2)
CHLORIDE BLD-SCNC: 102 MMOL/L (ref 98–111)
CO2: 23 MMOL/L (ref 22–29)
CREAT SERPL-MCNC: 1.2 MG/DL (ref 0.5–1.2)
GFR NON-AFRICAN AMERICAN: 60
GLUCOSE BLD-MCNC: 111 MG/DL (ref 74–109)
HCT VFR BLD CALC: 41.5 % (ref 42–52)
HEMOGLOBIN: 14.1 G/DL (ref 14–18)
INR BLD: 0.99 (ref 0.88–1.18)
MCH RBC QN AUTO: 34.8 PG (ref 27–31)
MCHC RBC AUTO-ENTMCNC: 34 G/DL (ref 33–37)
MCV RBC AUTO: 102.5 FL (ref 80–94)
PDW BLD-RTO: 12.6 % (ref 11.5–14.5)
PLATELET # BLD: 199 K/UL (ref 130–400)
PMV BLD AUTO: 11.1 FL (ref 7.4–10.4)
POTASSIUM SERPL-SCNC: 3.9 MMOL/L (ref 3.5–5)
PROTHROMBIN TIME: 13 SEC (ref 12–14.6)
RBC # BLD: 4.05 M/UL (ref 4.7–6.1)
SODIUM BLD-SCNC: 139 MMOL/L (ref 136–145)
WBC # BLD: 7.8 K/UL (ref 4.8–10.8)

## 2017-05-22 PROCEDURE — B319YZZ FLUOROSCOPY OF RIGHT EXTERNAL CAROTID ARTERY USING OTHER CONTRAST: ICD-10-PCS | Performed by: SURGERY

## 2017-05-22 PROCEDURE — B316YZZ FLUOROSCOPY OF RIGHT INTERNAL CAROTID ARTERY USING OTHER CONTRAST: ICD-10-PCS | Performed by: SURGERY

## 2017-05-22 PROCEDURE — 6360000002 HC RX W HCPCS: Performed by: SURGERY

## 2017-05-22 PROCEDURE — B310YZZ FLUOROSCOPY OF THORACIC AORTA USING OTHER CONTRAST: ICD-10-PCS | Performed by: SURGERY

## 2017-05-22 PROCEDURE — C1876 STENT, NON-COA/NON-COV W/DEL: HCPCS

## 2017-05-22 PROCEDURE — 2580000003 HC RX 258: Performed by: SURGERY

## 2017-05-22 PROCEDURE — 37215 TRANSCATH STENT CCA W/EPS: CPT | Performed by: SURGERY

## 2017-05-22 PROCEDURE — 37216 TRANSCATH STENT CCA W/O EPS: CPT | Performed by: SURGERY

## 2017-05-22 PROCEDURE — 80048 BASIC METABOLIC PNL TOTAL CA: CPT

## 2017-05-22 PROCEDURE — 6360000004 HC RX CONTRAST MEDICATION: Performed by: SURGERY

## 2017-05-22 PROCEDURE — 37221 PR REVSC OPN/PRQ ILIAC ART W/STNT PLMT & ANGIOPLSTY: CPT | Performed by: SURGERY

## 2017-05-22 PROCEDURE — 037K3DZ DILATION OF RIGHT INTERNAL CAROTID ARTERY WITH INTRALUMINAL DEVICE, PERCUTANEOUS APPROACH: ICD-10-PCS | Performed by: SURGERY

## 2017-05-22 PROCEDURE — 36221 PLACE CATH THORACIC AORTA: CPT | Performed by: SURGERY

## 2017-05-22 PROCEDURE — 85027 COMPLETE CBC AUTOMATED: CPT

## 2017-05-22 PROCEDURE — 36415 COLL VENOUS BLD VENIPUNCTURE: CPT

## 2017-05-22 PROCEDURE — 85610 PROTHROMBIN TIME: CPT

## 2017-05-22 PROCEDURE — 1210000000 HC MED SURG R&B

## 2017-05-22 PROCEDURE — B313YZZ FLUOROSCOPY OF RIGHT COMMON CAROTID ARTERY USING OTHER CONTRAST: ICD-10-PCS | Performed by: SURGERY

## 2017-05-22 PROCEDURE — 047H3DZ DILATION OF RIGHT EXTERNAL ILIAC ARTERY WITH INTRALUMINAL DEVICE, PERCUTANEOUS APPROACH: ICD-10-PCS | Performed by: SURGERY

## 2017-05-22 PROCEDURE — B41FYZZ FLUOROSCOPY OF RIGHT LOWER EXTREMITY ARTERIES USING OTHER CONTRAST: ICD-10-PCS | Performed by: SURGERY

## 2017-05-22 PROCEDURE — 37221 HC PLASTY ILIAC W STENT: CPT | Performed by: SURGERY

## 2017-05-22 PROCEDURE — 6370000000 HC RX 637 (ALT 250 FOR IP): Performed by: SURGERY

## 2017-05-22 PROCEDURE — 2500000003 HC RX 250 WO HCPCS: Performed by: SURGERY

## 2017-05-22 RX ORDER — LISINOPRIL 20 MG/1
20 TABLET ORAL DAILY
Status: DISCONTINUED | OUTPATIENT
Start: 2017-05-23 | End: 2017-05-23 | Stop reason: HOSPADM

## 2017-05-22 RX ORDER — HYDROCODONE BITARTRATE AND ACETAMINOPHEN 5; 325 MG/1; MG/1
2 TABLET ORAL EVERY 4 HOURS PRN
Status: DISCONTINUED | OUTPATIENT
Start: 2017-05-22 | End: 2017-05-23 | Stop reason: HOSPADM

## 2017-05-22 RX ORDER — CLONIDINE HYDROCHLORIDE 0.1 MG/1
0.1 TABLET ORAL
Status: DISCONTINUED | OUTPATIENT
Start: 2017-05-22 | End: 2017-05-23 | Stop reason: HOSPADM

## 2017-05-22 RX ORDER — FENTANYL CITRATE 50 UG/ML
INJECTION, SOLUTION INTRAMUSCULAR; INTRAVENOUS
Status: COMPLETED | OUTPATIENT
Start: 2017-05-22 | End: 2017-05-22

## 2017-05-22 RX ORDER — SODIUM CHLORIDE 0.9 % (FLUSH) 0.9 %
10 SYRINGE (ML) INJECTION PRN
Status: DISCONTINUED | OUTPATIENT
Start: 2017-05-22 | End: 2017-05-23 | Stop reason: HOSPADM

## 2017-05-22 RX ORDER — IODIXANOL 320 MG/ML
INJECTION, SOLUTION INTRAVASCULAR
Status: COMPLETED | OUTPATIENT
Start: 2017-05-22 | End: 2017-05-22

## 2017-05-22 RX ORDER — UBIDECARENONE 100 MG
100 CAPSULE ORAL DAILY
Status: DISCONTINUED | OUTPATIENT
Start: 2017-05-23 | End: 2017-05-23 | Stop reason: HOSPADM

## 2017-05-22 RX ORDER — CLOPIDOGREL BISULFATE 75 MG/1
75 TABLET ORAL DAILY
Status: DISCONTINUED | OUTPATIENT
Start: 2017-05-23 | End: 2017-05-23 | Stop reason: HOSPADM

## 2017-05-22 RX ORDER — HYDROCODONE BITARTRATE AND ACETAMINOPHEN 5; 325 MG/1; MG/1
1 TABLET ORAL EVERY 4 HOURS PRN
Status: DISCONTINUED | OUTPATIENT
Start: 2017-05-22 | End: 2017-05-23 | Stop reason: HOSPADM

## 2017-05-22 RX ORDER — HEPARIN SODIUM 5000 [USP'U]/ML
INJECTION, SOLUTION INTRAVENOUS; SUBCUTANEOUS
Status: COMPLETED | OUTPATIENT
Start: 2017-05-22 | End: 2017-05-22

## 2017-05-22 RX ORDER — SODIUM CHLORIDE 0.9 % (FLUSH) 0.9 %
10 SYRINGE (ML) INJECTION PRN
Status: DISCONTINUED | OUTPATIENT
Start: 2017-05-22 | End: 2017-05-22

## 2017-05-22 RX ORDER — SODIUM CHLORIDE 9 MG/ML
INJECTION, SOLUTION INTRAVENOUS CONTINUOUS
Status: ACTIVE | OUTPATIENT
Start: 2017-05-22 | End: 2017-05-22

## 2017-05-22 RX ORDER — ASPIRIN 81 MG/1
81 TABLET ORAL ONCE
Status: COMPLETED | OUTPATIENT
Start: 2017-05-22 | End: 2017-05-22

## 2017-05-22 RX ORDER — AMLODIPINE BESYLATE 5 MG/1
5 TABLET ORAL DAILY
Status: DISCONTINUED | OUTPATIENT
Start: 2017-05-23 | End: 2017-05-23 | Stop reason: HOSPADM

## 2017-05-22 RX ORDER — NITROGLYCERIN 0.4 MG/1
0.4 TABLET SUBLINGUAL EVERY 5 MIN PRN
Status: DISCONTINUED | OUTPATIENT
Start: 2017-05-22 | End: 2017-05-23 | Stop reason: HOSPADM

## 2017-05-22 RX ORDER — ONDANSETRON 2 MG/ML
4 INJECTION INTRAMUSCULAR; INTRAVENOUS EVERY 6 HOURS PRN
Status: DISCONTINUED | OUTPATIENT
Start: 2017-05-22 | End: 2017-05-23 | Stop reason: HOSPADM

## 2017-05-22 RX ORDER — SODIUM CHLORIDE 0.9 % (FLUSH) 0.9 %
10 SYRINGE (ML) INJECTION EVERY 12 HOURS SCHEDULED
Status: DISCONTINUED | OUTPATIENT
Start: 2017-05-22 | End: 2017-05-23 | Stop reason: HOSPADM

## 2017-05-22 RX ORDER — MIDAZOLAM HYDROCHLORIDE 1 MG/ML
INJECTION INTRAMUSCULAR; INTRAVENOUS
Status: COMPLETED | OUTPATIENT
Start: 2017-05-22 | End: 2017-05-22

## 2017-05-22 RX ORDER — ASPIRIN 325 MG
325 TABLET ORAL DAILY
Status: DISCONTINUED | OUTPATIENT
Start: 2017-05-22 | End: 2017-05-23 | Stop reason: HOSPADM

## 2017-05-22 RX ORDER — ATENOLOL 25 MG/1
25 TABLET ORAL DAILY
Status: DISCONTINUED | OUTPATIENT
Start: 2017-05-23 | End: 2017-05-23 | Stop reason: HOSPADM

## 2017-05-22 RX ORDER — ACETAMINOPHEN 325 MG/1
650 TABLET ORAL EVERY 4 HOURS PRN
Status: DISCONTINUED | OUTPATIENT
Start: 2017-05-22 | End: 2017-05-23 | Stop reason: HOSPADM

## 2017-05-22 RX ORDER — SODIUM CHLORIDE 9 MG/ML
INJECTION, SOLUTION INTRAVENOUS CONTINUOUS
Status: DISCONTINUED | OUTPATIENT
Start: 2017-05-22 | End: 2017-05-22

## 2017-05-22 RX ORDER — ATORVASTATIN CALCIUM 40 MG/1
40 TABLET, FILM COATED ORAL NIGHTLY
Status: DISCONTINUED | OUTPATIENT
Start: 2017-05-22 | End: 2017-05-23 | Stop reason: HOSPADM

## 2017-05-22 RX ORDER — LIDOCAINE HYDROCHLORIDE 20 MG/ML
INJECTION, SOLUTION INFILTRATION; PERINEURAL
Status: COMPLETED | OUTPATIENT
Start: 2017-05-22 | End: 2017-05-22

## 2017-05-22 RX ADMIN — HEPARIN SODIUM 5000 UNITS: 5000 INJECTION, SOLUTION INTRAVENOUS; SUBCUTANEOUS at 08:04

## 2017-05-22 RX ADMIN — FENTANYL CITRATE 25 MCG: 50 INJECTION INTRAMUSCULAR; INTRAVENOUS at 08:57

## 2017-05-22 RX ADMIN — SODIUM CHLORIDE: 9 INJECTION, SOLUTION INTRAVENOUS at 07:11

## 2017-05-22 RX ADMIN — IODIXANOL 130 ML: 320 INJECTION, SOLUTION INTRAVASCULAR at 09:06

## 2017-05-22 RX ADMIN — SODIUM CHLORIDE: 9 INJECTION, SOLUTION INTRAVENOUS at 15:21

## 2017-05-22 RX ADMIN — Medication 2 G: at 07:54

## 2017-05-22 RX ADMIN — Medication 10 ML: at 21:16

## 2017-05-22 RX ADMIN — ASPIRIN 325 MG ORAL TABLET 325 MG: 325 PILL ORAL at 15:23

## 2017-05-22 RX ADMIN — FENTANYL CITRATE 25 MCG: 50 INJECTION INTRAMUSCULAR; INTRAVENOUS at 08:09

## 2017-05-22 RX ADMIN — ATORVASTATIN CALCIUM 40 MG: 40 TABLET, FILM COATED ORAL at 21:13

## 2017-05-22 RX ADMIN — ASPIRIN 81 MG: 81 TABLET, COATED ORAL at 07:15

## 2017-05-22 RX ADMIN — MIDAZOLAM HYDROCHLORIDE 1 MG: 1 INJECTION, SOLUTION INTRAMUSCULAR; INTRAVENOUS at 08:08

## 2017-05-22 RX ADMIN — SODIUM CHLORIDE: 9 INJECTION, SOLUTION INTRAVENOUS at 12:25

## 2017-05-22 RX ADMIN — LIDOCAINE HYDROCHLORIDE 10 ML: 20 INJECTION, SOLUTION INFILTRATION; PERINEURAL at 08:10

## 2017-05-22 RX ADMIN — HEPARIN SODIUM 6000 UNITS: 5000 INJECTION, SOLUTION INTRAVENOUS; SUBCUTANEOUS at 08:16

## 2017-05-22 RX ADMIN — MIDAZOLAM HYDROCHLORIDE 1 MG: 1 INJECTION, SOLUTION INTRAMUSCULAR; INTRAVENOUS at 08:57

## 2017-05-23 VITALS
DIASTOLIC BLOOD PRESSURE: 54 MMHG | TEMPERATURE: 98.7 F | HEIGHT: 72 IN | BODY MASS INDEX: 29.8 KG/M2 | HEART RATE: 49 BPM | OXYGEN SATURATION: 98 % | RESPIRATION RATE: 16 BRPM | SYSTOLIC BLOOD PRESSURE: 96 MMHG | WEIGHT: 220 LBS

## 2017-05-23 LAB
ANION GAP SERPL CALCULATED.3IONS-SCNC: 14 MMOL/L (ref 7–19)
BUN BLDV-MCNC: 23 MG/DL (ref 8–23)
CALCIUM SERPL-MCNC: 9.3 MG/DL (ref 8.8–10.2)
CHLORIDE BLD-SCNC: 104 MMOL/L (ref 98–111)
CO2: 23 MMOL/L (ref 22–29)
CREAT SERPL-MCNC: 1.2 MG/DL (ref 0.5–1.2)
GFR NON-AFRICAN AMERICAN: 60
GLUCOSE BLD-MCNC: 115 MG/DL (ref 74–109)
HCT VFR BLD CALC: 36.9 % (ref 42–52)
HEMOGLOBIN: 12.4 G/DL (ref 14–18)
MCH RBC QN AUTO: 34.8 PG (ref 27–31)
MCHC RBC AUTO-ENTMCNC: 33.6 G/DL (ref 33–37)
MCV RBC AUTO: 103.7 FL (ref 80–94)
PDW BLD-RTO: 12.8 % (ref 11.5–14.5)
PLATELET # BLD: 174 K/UL (ref 130–400)
PMV BLD AUTO: 11.4 FL (ref 7.4–10.4)
POTASSIUM SERPL-SCNC: 3.9 MMOL/L (ref 3.5–5)
RBC # BLD: 3.56 M/UL (ref 4.7–6.1)
SODIUM BLD-SCNC: 141 MMOL/L (ref 136–145)
WBC # BLD: 8 K/UL (ref 4.8–10.8)

## 2017-05-23 PROCEDURE — 99231 SBSQ HOSP IP/OBS SF/LOW 25: CPT | Performed by: SURGERY

## 2017-05-23 PROCEDURE — 85027 COMPLETE CBC AUTOMATED: CPT

## 2017-05-23 PROCEDURE — 36415 COLL VENOUS BLD VENIPUNCTURE: CPT

## 2017-05-23 PROCEDURE — 80048 BASIC METABOLIC PNL TOTAL CA: CPT

## 2017-05-23 PROCEDURE — 2580000003 HC RX 258: Performed by: SURGERY

## 2017-05-23 PROCEDURE — 6370000000 HC RX 637 (ALT 250 FOR IP): Performed by: SURGERY

## 2017-05-23 RX ADMIN — AMLODIPINE BESYLATE 5 MG: 5 TABLET ORAL at 08:04

## 2017-05-23 RX ADMIN — Medication 10 ML: at 08:06

## 2017-05-23 RX ADMIN — LISINOPRIL 20 MG: 20 TABLET ORAL at 08:05

## 2017-05-23 RX ADMIN — LEVOTHYROXINE SODIUM 125 MCG: 100 TABLET ORAL at 06:20

## 2017-05-23 RX ADMIN — ACETAMINOPHEN 650 MG: 325 TABLET, FILM COATED ORAL at 07:22

## 2017-05-23 RX ADMIN — CLOPIDOGREL BISULFATE 75 MG: 75 TABLET ORAL at 08:04

## 2017-05-23 RX ADMIN — ASPIRIN 325 MG ORAL TABLET 325 MG: 325 PILL ORAL at 08:05

## 2017-05-23 RX ADMIN — ACETAMINOPHEN 650 MG: 325 TABLET, FILM COATED ORAL at 03:23

## 2017-05-23 RX ADMIN — ATENOLOL 25 MG: 25 TABLET ORAL at 08:04

## 2017-05-23 RX ADMIN — Medication 100 MG: at 08:04

## 2017-05-23 ASSESSMENT — PAIN SCALES - GENERAL
PAINLEVEL_OUTOF10: 0
PAINLEVEL_OUTOF10: 3
PAINLEVEL_OUTOF10: 4

## 2017-06-08 ENCOUNTER — OFFICE VISIT (OUTPATIENT)
Dept: VASCULAR SURGERY | Age: 72
End: 2017-06-08
Payer: MEDICARE

## 2017-06-08 VITALS
HEART RATE: 60 BPM | TEMPERATURE: 96.5 F | DIASTOLIC BLOOD PRESSURE: 88 MMHG | RESPIRATION RATE: 18 BRPM | SYSTOLIC BLOOD PRESSURE: 140 MMHG

## 2017-06-08 DIAGNOSIS — I65.23 BILATERAL CAROTID ARTERY STENOSIS: Primary | ICD-10-CM

## 2017-06-08 DIAGNOSIS — I70.213 ATHEROSCLEROSIS OF NATIVE ARTERY OF BOTH LOWER EXTREMITIES WITH INTERMITTENT CLAUDICATION (HCC): ICD-10-CM

## 2017-06-08 PROCEDURE — 1111F DSCHRG MED/CURRENT MED MERGE: CPT | Performed by: NURSE PRACTITIONER

## 2017-06-08 PROCEDURE — G8427 DOCREV CUR MEDS BY ELIG CLIN: HCPCS | Performed by: NURSE PRACTITIONER

## 2017-06-08 PROCEDURE — 99212 OFFICE O/P EST SF 10 MIN: CPT | Performed by: NURSE PRACTITIONER

## 2017-06-08 PROCEDURE — G8598 ASA/ANTIPLAT THER USED: HCPCS | Performed by: NURSE PRACTITIONER

## 2017-06-08 PROCEDURE — 4004F PT TOBACCO SCREEN RCVD TLK: CPT | Performed by: NURSE PRACTITIONER

## 2017-06-08 PROCEDURE — 1123F ACP DISCUSS/DSCN MKR DOCD: CPT | Performed by: NURSE PRACTITIONER

## 2017-06-08 PROCEDURE — G8420 CALC BMI NORM PARAMETERS: HCPCS | Performed by: NURSE PRACTITIONER

## 2017-06-08 PROCEDURE — 3017F COLORECTAL CA SCREEN DOC REV: CPT | Performed by: NURSE PRACTITIONER

## 2017-06-08 PROCEDURE — 4040F PNEUMOC VAC/ADMIN/RCVD: CPT | Performed by: NURSE PRACTITIONER

## 2017-06-08 RX ORDER — AMOXICILLIN 500 MG/1
500 CAPSULE ORAL 3 TIMES DAILY
COMMUNITY
End: 2017-07-13 | Stop reason: ALTCHOICE

## 2017-06-08 RX ORDER — HYDROCODONE BITARTRATE AND ACETAMINOPHEN 10; 325 MG/1; MG/1
TABLET ORAL
Refills: 0 | COMMUNITY
Start: 2017-03-28 | End: 2017-06-14

## 2017-06-09 RX ORDER — CLOPIDOGREL BISULFATE 75 MG/1
75 TABLET ORAL DAILY
Qty: 30 TABLET | Refills: 11 | Status: SHIPPED | OUTPATIENT
Start: 2017-06-09 | End: 2018-06-15 | Stop reason: SDUPTHER

## 2017-06-09 RX ORDER — AMLODIPINE BESYLATE 5 MG/1
5 TABLET ORAL DAILY
Qty: 30 TABLET | Refills: 11 | Status: SHIPPED | OUTPATIENT
Start: 2017-06-09 | End: 2018-01-17 | Stop reason: SDUPTHER

## 2017-06-09 RX ORDER — ATORVASTATIN CALCIUM 40 MG/1
40 TABLET, FILM COATED ORAL DAILY
Qty: 30 TABLET | Refills: 11 | Status: SHIPPED | OUTPATIENT
Start: 2017-06-09

## 2017-06-09 RX ORDER — LISINOPRIL 20 MG/1
20 TABLET ORAL DAILY
Qty: 30 TABLET | Refills: 11 | Status: SHIPPED | OUTPATIENT
Start: 2017-06-09

## 2017-06-09 RX ORDER — ATENOLOL 25 MG/1
25 TABLET ORAL DAILY
Qty: 30 TABLET | Refills: 11 | Status: SHIPPED | OUTPATIENT
Start: 2017-06-09

## 2017-06-14 ENCOUNTER — OFFICE VISIT (OUTPATIENT)
Dept: NEUROLOGY | Age: 72
End: 2017-06-14
Payer: MEDICARE

## 2017-06-14 VITALS
HEIGHT: 72 IN | WEIGHT: 218 LBS | SYSTOLIC BLOOD PRESSURE: 145 MMHG | BODY MASS INDEX: 29.53 KG/M2 | HEART RATE: 56 BPM | DIASTOLIC BLOOD PRESSURE: 75 MMHG | OXYGEN SATURATION: 100 %

## 2017-06-14 DIAGNOSIS — R94.02 ABNORMAL BRAIN SCAN: ICD-10-CM

## 2017-06-14 DIAGNOSIS — E05.90 HYPERTHYROIDISM: ICD-10-CM

## 2017-06-14 DIAGNOSIS — I25.10 CORONARY ARTERY DISEASE INVOLVING NATIVE CORONARY ARTERY OF NATIVE HEART WITHOUT ANGINA PECTORIS: ICD-10-CM

## 2017-06-14 DIAGNOSIS — I77.9 CAROTID DISEASE, BILATERAL (HCC): ICD-10-CM

## 2017-06-14 DIAGNOSIS — E78.5 HYPERLIPIDEMIA, UNSPECIFIED HYPERLIPIDEMIA TYPE: ICD-10-CM

## 2017-06-14 DIAGNOSIS — R41.3 MEMORY LOSS: Primary | ICD-10-CM

## 2017-06-14 PROCEDURE — G8419 CALC BMI OUT NRM PARAM NOF/U: HCPCS | Performed by: PSYCHIATRY & NEUROLOGY

## 2017-06-14 PROCEDURE — G8598 ASA/ANTIPLAT THER USED: HCPCS | Performed by: PSYCHIATRY & NEUROLOGY

## 2017-06-14 PROCEDURE — G8427 DOCREV CUR MEDS BY ELIG CLIN: HCPCS | Performed by: PSYCHIATRY & NEUROLOGY

## 2017-06-14 PROCEDURE — 4040F PNEUMOC VAC/ADMIN/RCVD: CPT | Performed by: PSYCHIATRY & NEUROLOGY

## 2017-06-14 PROCEDURE — 4004F PT TOBACCO SCREEN RCVD TLK: CPT | Performed by: PSYCHIATRY & NEUROLOGY

## 2017-06-14 PROCEDURE — 1111F DSCHRG MED/CURRENT MED MERGE: CPT | Performed by: PSYCHIATRY & NEUROLOGY

## 2017-06-14 PROCEDURE — 3017F COLORECTAL CA SCREEN DOC REV: CPT | Performed by: PSYCHIATRY & NEUROLOGY

## 2017-06-14 PROCEDURE — 1123F ACP DISCUSS/DSCN MKR DOCD: CPT | Performed by: PSYCHIATRY & NEUROLOGY

## 2017-06-14 PROCEDURE — 99214 OFFICE O/P EST MOD 30 MIN: CPT | Performed by: PSYCHIATRY & NEUROLOGY

## 2017-07-13 ENCOUNTER — OFFICE VISIT (OUTPATIENT)
Dept: CARDIOLOGY | Facility: CLINIC | Age: 72
End: 2017-07-13

## 2017-07-13 ENCOUNTER — TELEPHONE (OUTPATIENT)
Dept: VASCULAR SURGERY | Age: 72
End: 2017-07-13

## 2017-07-13 ENCOUNTER — OFFICE VISIT (OUTPATIENT)
Dept: VASCULAR SURGERY | Age: 72
End: 2017-07-13
Payer: MEDICARE

## 2017-07-13 VITALS
WEIGHT: 216.6 LBS | BODY MASS INDEX: 29.34 KG/M2 | DIASTOLIC BLOOD PRESSURE: 86 MMHG | HEART RATE: 59 BPM | SYSTOLIC BLOOD PRESSURE: 160 MMHG | HEIGHT: 72 IN

## 2017-07-13 VITALS
HEART RATE: 59 BPM | DIASTOLIC BLOOD PRESSURE: 80 MMHG | OXYGEN SATURATION: 96 % | TEMPERATURE: 97.8 F | SYSTOLIC BLOOD PRESSURE: 132 MMHG | RESPIRATION RATE: 18 BRPM

## 2017-07-13 DIAGNOSIS — E78.2 MIXED HYPERLIPIDEMIA: Primary | ICD-10-CM

## 2017-07-13 DIAGNOSIS — I70.213 ATHEROSCLEROSIS OF NATIVE ARTERY OF BOTH LOWER EXTREMITIES WITH INTERMITTENT CLAUDICATION (HCC): Primary | ICD-10-CM

## 2017-07-13 DIAGNOSIS — I48.0 PAROXYSMAL ATRIAL FIBRILLATION (HCC): ICD-10-CM

## 2017-07-13 DIAGNOSIS — Z95.5 STENTED CORONARY ARTERY: ICD-10-CM

## 2017-07-13 DIAGNOSIS — I25.10 CORONARY ARTERY DISEASE INVOLVING NATIVE CORONARY ARTERY OF NATIVE HEART WITHOUT ANGINA PECTORIS: ICD-10-CM

## 2017-07-13 DIAGNOSIS — I10 ESSENTIAL HYPERTENSION: ICD-10-CM

## 2017-07-13 PROCEDURE — 4040F PNEUMOC VAC/ADMIN/RCVD: CPT | Performed by: NURSE PRACTITIONER

## 2017-07-13 PROCEDURE — G8598 ASA/ANTIPLAT THER USED: HCPCS | Performed by: NURSE PRACTITIONER

## 2017-07-13 PROCEDURE — 3017F COLORECTAL CA SCREEN DOC REV: CPT | Performed by: NURSE PRACTITIONER

## 2017-07-13 PROCEDURE — 99213 OFFICE O/P EST LOW 20 MIN: CPT | Performed by: NURSE PRACTITIONER

## 2017-07-13 PROCEDURE — G8419 CALC BMI OUT NRM PARAM NOF/U: HCPCS | Performed by: NURSE PRACTITIONER

## 2017-07-13 PROCEDURE — 93000 ELECTROCARDIOGRAM COMPLETE: CPT | Performed by: INTERNAL MEDICINE

## 2017-07-13 PROCEDURE — 1123F ACP DISCUSS/DSCN MKR DOCD: CPT | Performed by: NURSE PRACTITIONER

## 2017-07-13 PROCEDURE — 99214 OFFICE O/P EST MOD 30 MIN: CPT | Performed by: INTERNAL MEDICINE

## 2017-07-13 PROCEDURE — 4004F PT TOBACCO SCREEN RCVD TLK: CPT | Performed by: NURSE PRACTITIONER

## 2017-07-13 PROCEDURE — G8427 DOCREV CUR MEDS BY ELIG CLIN: HCPCS | Performed by: NURSE PRACTITIONER

## 2017-07-13 NOTE — PROGRESS NOTES
Robin Herrera  [id number]  1945  71 y.o.  male    Referring Provider: Wilber Rivera Jr., MD    Reason for Follow-up Visit: PAF  Recent left carotid endarterectomy  Post surgery brief atrial flutter that resolved on day 3 POD per available ECG from [facility] 4/22 brief SVT to PAF/flutter  Overall doing well  Denies any chest pain  No excessive shortness of breath  Compliant with medications    History of present illness:  Robin Herrera is a 71 y.o. yo male with history of carotid stenosis who presents today for   Chief Complaint   Patient presents with   • Atrial Fibrillation     2 month f/u    • Results     Holter    .    History  Past Medical History:   Diagnosis Date   • AAA (abdominal aortic aneurysm)    • Atrial fibrillation    • CAD in native artery    • Headache    • Hypertension    ,   Past Surgical History:   Procedure Laterality Date   • CARDIAC CATHETERIZATION     • CAROTID STENT  2010    X 1   • CAROTID STENT      Right    • EYE SURGERY Bilateral     cataracts    • PROSTATECTOMY     • TONSILLECTOMY     ,   Family History   Problem Relation Age of Onset   • Alzheimer's disease Father    • Kidney failure Father    • Coronary artery disease Neg Hx    ,   Social History   Substance Use Topics   • Smoking status: Current Some Day Smoker     Packs/day: 0.50     Types: Cigarettes   • Smokeless tobacco: Never Used      Comment: 03/2017   • Alcohol use Yes      Comment: MODERATE DRINKS BEER   ,     Medications  Current Outpatient Prescriptions   Medication Sig Dispense Refill   • amLODIPine (NORVASC) 5 MG tablet Take 1 tablet by mouth Daily. 30 tablet 11   • aspirin 325 MG tablet Take 325 mg by mouth daily.     • atenolol (TENORMIN) 25 MG tablet Take 1 tablet by mouth Daily. 30 tablet 11   • atorvastatin (LIPITOR) 40 MG tablet Take 1 tablet by mouth Daily. 30 tablet 11   • cholecalciferol (VITAMIN D3) 1000 UNITS tablet Take 1,000 Units by mouth daily.     • clopidogrel (PLAVIX) 75 MG  "tablet Take 1 tablet by mouth Daily. 30 tablet 11   • coenzyme Q10 100 MG capsule Take 100 mg by mouth daily.     • levothyroxine (SYNTHROID, LEVOTHROID) 137 MCG tablet Take 0.125 mcg by mouth Daily.     • lisinopril (PRINIVIL,ZESTRIL) 20 MG tablet Take 1 tablet by mouth Daily. 30 tablet 11   • Multiple Vitamins-Minerals (MULTIVITAMIN ADULT PO) Take  by mouth.     • nicotine polacrilex (NICORETTE) 4 MG gum Chew 4 mg As Needed for smoking cessation.     • nitroglycerin (NITROSTAT) 0.4 MG SL tablet Place 0.4 mg under the tongue every 5 (five) minutes as needed for chest pain. Take no more than 3 doses in 15 minutes.     • PrednisoLONE 5 MG tablet Take  by mouth.       No current facility-administered medications for this visit.        Allergies:  Pollen extract    Review of Systems  Review of Systems   Constitution: Negative.   HENT: Negative.    Eyes: Negative.    Cardiovascular: Negative for chest pain, claudication, cyanosis, dyspnea on exertion, irregular heartbeat, leg swelling, near-syncope, orthopnea, palpitations, paroxysmal nocturnal dyspnea and syncope.   Respiratory: Negative.    Endocrine: Negative.    Hematologic/Lymphatic: Negative.    Skin: Negative.    Gastrointestinal: Negative for anorexia.   Genitourinary: Negative.    Neurological: Negative.    Psychiatric/Behavioral: Negative.        Objective     Physical Exam:  /86 (BP Location: Left arm, Patient Position: Sitting, Cuff Size: Adult)  Pulse 59  Ht 72\" (182.9 cm)  Wt 216 lb 9.6 oz (98.2 kg)  BMI 29.38 kg/m2  Physical Exam   Constitutional: He appears well-developed.   HENT:   Head: Normocephalic.   Neck: Normal carotid pulses and no JVD present. No tracheal tenderness present. Carotid bruit is not present. No tracheal deviation and no edema present.   Cardiovascular: Regular rhythm, normal heart sounds and normal pulses.    Pulmonary/Chest: Effort normal. No stridor.   Abdominal: Soft.   Neurological: He is alert. He has normal strength. " No cranial nerve deficit or sensory deficit.   Skin: Skin is warm.   Psychiatric: He has a normal mood and affect. His speech is normal and behavior is normal.       Results Review:       ECG 12 Lead  Date/Time: 7/13/2017 10:32 AM  Performed by: STEPHANIE DOWLING  Authorized by: STEPHANIE DOWLING   Comparison: compared with previous ECG from 5/11/2017  Rhythm: sinus bradycardia  Rate: normal  ST Segments: ST segments normal  T Waves: T waves normal  Clinical impression: abnormal ECG  Comments: IVCD            Assessment/Plan   Patient Active Problem List   Diagnosis   • Hyperlipemia   • Essential hypertension   • Coronary artery disease involving native coronary artery of native heart without angina pectoris   • Stented coronary artery   • Atrial fibrillation       No palpitations. No significant pedal edema. Compliant with medications and diet. Latest labs and medications reviewed.  Zio patch shows minor ectopy and SVT    Plan:  Same treatment, Monitor for any signs of bleeding including red or dark stools. Fall precautions.   Patient is asked to monitor BP at home or work, several times per month and return with written values at next office visit.  May need to increase Amlodipine from 5 to 10 mg daily  No additional cardiac testing required at this point in time.  Keep LDL below 70 mg/dl. Monitor liver and renal functions.    Will defer anticoagulation for now continue ASA and Plavix as multiple vascular procedures, likely had Paroxysmal atrial fibrillation after surgery  Close follow up with you as scheduled.  Intensive factor modifications.  See order list.    Counseled regarding disease appropriate diet, fluid, caffeine, stimulants and sodium intake as well as importance of compliance to diet, exercise and regular follow up.  Avoid NSAIDS and COX2 inhibitors. Use Acetaminophen PRN.    Return in about 5 months (around 12/13/2017).

## 2017-07-17 ENCOUNTER — TELEPHONE (OUTPATIENT)
Dept: VASCULAR SURGERY | Age: 72
End: 2017-07-17

## 2017-07-17 ENCOUNTER — PREP FOR PROCEDURE (OUTPATIENT)
Dept: VASCULAR SURGERY | Age: 72
End: 2017-07-17

## 2017-07-17 RX ORDER — SODIUM CHLORIDE 0.9 % (FLUSH) 0.9 %
10 SYRINGE (ML) INJECTION EVERY 12 HOURS SCHEDULED
Status: CANCELLED | OUTPATIENT
Start: 2017-07-24

## 2017-07-17 RX ORDER — SODIUM CHLORIDE 0.9 % (FLUSH) 0.9 %
10 SYRINGE (ML) INJECTION PRN
Status: CANCELLED | OUTPATIENT
Start: 2017-07-24

## 2017-07-17 RX ORDER — CLONIDINE HYDROCHLORIDE 0.1 MG/1
0.1 TABLET ORAL PRN
Status: CANCELLED | OUTPATIENT
Start: 2017-07-17

## 2017-07-17 RX ORDER — ASPIRIN 81 MG/1
81 TABLET ORAL ONCE
Status: CANCELLED | OUTPATIENT
Start: 2017-07-24 | End: 2017-07-17

## 2017-07-25 ENCOUNTER — HOSPITAL ENCOUNTER (OUTPATIENT)
Dept: INTERVENTIONAL RADIOLOGY/VASCULAR | Age: 72
Discharge: HOME | End: 2017-07-25
Payer: MEDICARE

## 2017-07-25 VITALS
HEIGHT: 72 IN | RESPIRATION RATE: 12 BRPM | OXYGEN SATURATION: 100 % | SYSTOLIC BLOOD PRESSURE: 117 MMHG | WEIGHT: 220 LBS | TEMPERATURE: 97.9 F | BODY MASS INDEX: 29.8 KG/M2 | HEART RATE: 56 BPM | DIASTOLIC BLOOD PRESSURE: 75 MMHG

## 2017-07-25 DIAGNOSIS — I70.213 ATHEROSCLEROSIS OF NATIVE ARTERY OF BOTH LOWER EXTREMITIES WITH INTERMITTENT CLAUDICATION (HCC): ICD-10-CM

## 2017-07-25 LAB
ANION GAP SERPL CALCULATED.3IONS-SCNC: 12 MMOL/L (ref 7–19)
BUN BLDV-MCNC: 22 MG/DL (ref 8–23)
CALCIUM SERPL-MCNC: 10.3 MG/DL (ref 8.8–10.2)
CHLORIDE BLD-SCNC: 101 MMOL/L (ref 98–111)
CO2: 26 MMOL/L (ref 22–29)
CREAT SERPL-MCNC: 1.2 MG/DL (ref 0.5–1.2)
GFR NON-AFRICAN AMERICAN: 60
GLUCOSE BLD-MCNC: 108 MG/DL (ref 74–109)
HCT VFR BLD CALC: 41.2 % (ref 42–52)
HEMOGLOBIN: 14.5 G/DL (ref 14–18)
MCH RBC QN AUTO: 34.9 PG (ref 27–31)
MCHC RBC AUTO-ENTMCNC: 35.2 G/DL (ref 33–37)
MCV RBC AUTO: 99 FL (ref 80–94)
PDW BLD-RTO: 12.7 % (ref 11.5–14.5)
PLATELET # BLD: 219 K/UL (ref 130–400)
PMV BLD AUTO: 11.3 FL (ref 9.4–12.4)
POTASSIUM SERPL-SCNC: 3.5 MMOL/L (ref 3.5–5)
RBC # BLD: 4.16 M/UL (ref 4.7–6.1)
SODIUM BLD-SCNC: 139 MMOL/L (ref 136–145)
WBC # BLD: 8.5 K/UL (ref 4.8–10.8)

## 2017-07-25 PROCEDURE — 6360000002 HC RX W HCPCS: Performed by: NURSE PRACTITIONER

## 2017-07-25 PROCEDURE — 6360000004 HC RX CONTRAST MEDICATION: Performed by: SURGERY

## 2017-07-25 PROCEDURE — 85027 COMPLETE CBC AUTOMATED: CPT

## 2017-07-25 PROCEDURE — 93970 EXTREMITY STUDY: CPT

## 2017-07-25 PROCEDURE — 75716 ARTERY X-RAYS ARMS/LEGS: CPT | Performed by: SURGERY

## 2017-07-25 PROCEDURE — 75625 CONTRAST EXAM ABDOMINL AORTA: CPT | Performed by: SURGERY

## 2017-07-25 PROCEDURE — 2500000003 HC RX 250 WO HCPCS: Performed by: SURGERY

## 2017-07-25 PROCEDURE — C1769 GUIDE WIRE: HCPCS

## 2017-07-25 PROCEDURE — 2580000003 HC RX 258: Performed by: SURGERY

## 2017-07-25 PROCEDURE — G0269 OCCLUSIVE DEVICE IN VEIN ART: HCPCS | Performed by: SURGERY

## 2017-07-25 PROCEDURE — 36415 COLL VENOUS BLD VENIPUNCTURE: CPT

## 2017-07-25 PROCEDURE — 80048 BASIC METABOLIC PNL TOTAL CA: CPT

## 2017-07-25 PROCEDURE — 36200 PLACE CATHETER IN AORTA: CPT | Performed by: SURGERY

## 2017-07-25 PROCEDURE — 6360000002 HC RX W HCPCS: Performed by: SURGERY

## 2017-07-25 PROCEDURE — 6370000000 HC RX 637 (ALT 250 FOR IP): Performed by: NURSE PRACTITIONER

## 2017-07-25 RX ORDER — MIDAZOLAM HYDROCHLORIDE 1 MG/ML
INJECTION INTRAMUSCULAR; INTRAVENOUS
Status: COMPLETED | OUTPATIENT
Start: 2017-07-25 | End: 2017-07-25

## 2017-07-25 RX ORDER — ASPIRIN 81 MG/1
81 TABLET ORAL ONCE
Status: COMPLETED | OUTPATIENT
Start: 2017-07-25 | End: 2017-07-25

## 2017-07-25 RX ORDER — HEPARIN SODIUM 5000 [USP'U]/ML
INJECTION, SOLUTION INTRAVENOUS; SUBCUTANEOUS
Status: COMPLETED | OUTPATIENT
Start: 2017-07-25 | End: 2017-07-25

## 2017-07-25 RX ORDER — ONDANSETRON 2 MG/ML
4 INJECTION INTRAMUSCULAR; INTRAVENOUS EVERY 8 HOURS PRN
Status: DISCONTINUED | OUTPATIENT
Start: 2017-07-25 | End: 2017-07-27 | Stop reason: HOSPADM

## 2017-07-25 RX ORDER — FENTANYL CITRATE 50 UG/ML
INJECTION, SOLUTION INTRAMUSCULAR; INTRAVENOUS
Status: COMPLETED | OUTPATIENT
Start: 2017-07-25 | End: 2017-07-25

## 2017-07-25 RX ORDER — HYDROCODONE BITARTRATE AND ACETAMINOPHEN 5; 325 MG/1; MG/1
1 TABLET ORAL EVERY 4 HOURS PRN
Status: DISCONTINUED | OUTPATIENT
Start: 2017-07-25 | End: 2017-07-27 | Stop reason: HOSPADM

## 2017-07-25 RX ORDER — SODIUM CHLORIDE 0.9 % (FLUSH) 0.9 %
10 SYRINGE (ML) INJECTION PRN
Status: DISCONTINUED | OUTPATIENT
Start: 2017-07-25 | End: 2017-07-27 | Stop reason: HOSPADM

## 2017-07-25 RX ORDER — ACETAMINOPHEN 325 MG/1
650 TABLET ORAL EVERY 4 HOURS PRN
Status: DISCONTINUED | OUTPATIENT
Start: 2017-07-25 | End: 2017-07-27 | Stop reason: HOSPADM

## 2017-07-25 RX ORDER — LIDOCAINE HYDROCHLORIDE 20 MG/ML
INJECTION, SOLUTION INFILTRATION; PERINEURAL
Status: COMPLETED | OUTPATIENT
Start: 2017-07-25 | End: 2017-07-25

## 2017-07-25 RX ORDER — SODIUM CHLORIDE 9 MG/ML
INJECTION, SOLUTION INTRAVENOUS CONTINUOUS
Status: DISCONTINUED | OUTPATIENT
Start: 2017-07-25 | End: 2017-07-27 | Stop reason: HOSPADM

## 2017-07-25 RX ORDER — SODIUM CHLORIDE 0.9 % (FLUSH) 0.9 %
10 SYRINGE (ML) INJECTION EVERY 12 HOURS SCHEDULED
Status: DISCONTINUED | OUTPATIENT
Start: 2017-07-25 | End: 2017-07-27 | Stop reason: HOSPADM

## 2017-07-25 RX ORDER — SODIUM CHLORIDE 9 MG/ML
INJECTION, SOLUTION INTRAVENOUS CONTINUOUS
Status: DISCONTINUED | OUTPATIENT
Start: 2017-07-25 | End: 2017-07-25 | Stop reason: SDUPTHER

## 2017-07-25 RX ORDER — CLONIDINE HYDROCHLORIDE 0.1 MG/1
0.1 TABLET ORAL PRN
Status: DISCONTINUED | OUTPATIENT
Start: 2017-07-25 | End: 2017-07-27 | Stop reason: HOSPADM

## 2017-07-25 RX ORDER — HYDROCODONE BITARTRATE AND ACETAMINOPHEN 5; 325 MG/1; MG/1
2 TABLET ORAL EVERY 4 HOURS PRN
Status: DISCONTINUED | OUTPATIENT
Start: 2017-07-25 | End: 2017-07-27 | Stop reason: HOSPADM

## 2017-07-25 RX ORDER — IODIXANOL 320 MG/ML
INJECTION, SOLUTION INTRAVASCULAR
Status: COMPLETED | OUTPATIENT
Start: 2017-07-25 | End: 2017-07-25

## 2017-07-25 RX ADMIN — SODIUM CHLORIDE: 9 INJECTION, SOLUTION INTRAVENOUS at 07:11

## 2017-07-25 RX ADMIN — HEPARIN SODIUM 5000 UNITS: 5000 INJECTION, SOLUTION INTRAVENOUS; SUBCUTANEOUS at 08:08

## 2017-07-25 RX ADMIN — LIDOCAINE HYDROCHLORIDE 10 ML: 20 INJECTION, SOLUTION INFILTRATION; PERINEURAL at 08:11

## 2017-07-25 RX ADMIN — HEPARIN SODIUM 3000 UNITS: 5000 INJECTION, SOLUTION INTRAVENOUS; SUBCUTANEOUS at 08:28

## 2017-07-25 RX ADMIN — IODIXANOL 150 ML: 320 INJECTION, SOLUTION INTRAVASCULAR at 08:46

## 2017-07-25 RX ADMIN — FENTANYL CITRATE 25 MCG: 50 INJECTION INTRAMUSCULAR; INTRAVENOUS at 08:08

## 2017-07-25 RX ADMIN — MIDAZOLAM HYDROCHLORIDE 1 MG: 1 INJECTION, SOLUTION INTRAMUSCULAR; INTRAVENOUS at 08:08

## 2017-07-25 RX ADMIN — Medication 2 G: at 07:49

## 2017-07-25 RX ADMIN — ASPIRIN 81 MG: 81 TABLET, COATED ORAL at 07:15

## 2017-07-27 ENCOUNTER — OFFICE VISIT (OUTPATIENT)
Dept: VASCULAR SURGERY | Age: 72
End: 2017-07-27
Payer: MEDICARE

## 2017-07-27 VITALS
TEMPERATURE: 97.8 F | OXYGEN SATURATION: 98 % | DIASTOLIC BLOOD PRESSURE: 80 MMHG | RESPIRATION RATE: 18 BRPM | HEART RATE: 69 BPM | SYSTOLIC BLOOD PRESSURE: 150 MMHG

## 2017-07-27 DIAGNOSIS — I71.40 AAA (ABDOMINAL AORTIC ANEURYSM) WITHOUT RUPTURE: ICD-10-CM

## 2017-07-27 DIAGNOSIS — I70.213 ATHEROSCLEROSIS OF NATIVE ARTERY OF BOTH LOWER EXTREMITIES WITH INTERMITTENT CLAUDICATION (HCC): Primary | ICD-10-CM

## 2017-07-27 PROCEDURE — 4040F PNEUMOC VAC/ADMIN/RCVD: CPT | Performed by: NURSE PRACTITIONER

## 2017-07-27 PROCEDURE — G8598 ASA/ANTIPLAT THER USED: HCPCS | Performed by: NURSE PRACTITIONER

## 2017-07-27 PROCEDURE — 99214 OFFICE O/P EST MOD 30 MIN: CPT | Performed by: NURSE PRACTITIONER

## 2017-07-27 PROCEDURE — 3017F COLORECTAL CA SCREEN DOC REV: CPT | Performed by: NURSE PRACTITIONER

## 2017-07-27 PROCEDURE — G8419 CALC BMI OUT NRM PARAM NOF/U: HCPCS | Performed by: NURSE PRACTITIONER

## 2017-07-27 PROCEDURE — 1123F ACP DISCUSS/DSCN MKR DOCD: CPT | Performed by: NURSE PRACTITIONER

## 2017-07-27 PROCEDURE — G8427 DOCREV CUR MEDS BY ELIG CLIN: HCPCS | Performed by: NURSE PRACTITIONER

## 2017-07-27 PROCEDURE — 4004F PT TOBACCO SCREEN RCVD TLK: CPT | Performed by: NURSE PRACTITIONER

## 2017-08-02 ENCOUNTER — PREP FOR PROCEDURE (OUTPATIENT)
Dept: VASCULAR SURGERY | Age: 72
End: 2017-08-02

## 2017-08-04 ENCOUNTER — TELEPHONE (OUTPATIENT)
Dept: VASCULAR SURGERY | Age: 72
End: 2017-08-04

## 2017-08-04 RX ORDER — SODIUM CHLORIDE 0.9 % (FLUSH) 0.9 %
10 SYRINGE (ML) INJECTION EVERY 12 HOURS SCHEDULED
Status: CANCELLED | OUTPATIENT
Start: 2017-08-15

## 2017-08-04 RX ORDER — SODIUM CHLORIDE 0.9 % (FLUSH) 0.9 %
10 SYRINGE (ML) INJECTION PRN
Status: CANCELLED | OUTPATIENT
Start: 2017-08-15

## 2017-08-04 RX ORDER — ASPIRIN 81 MG/1
81 TABLET ORAL ONCE
Status: CANCELLED | OUTPATIENT
Start: 2017-08-15 | End: 2017-08-04

## 2017-08-08 ENCOUNTER — HOSPITAL ENCOUNTER (OUTPATIENT)
Dept: PREADMISSION TESTING | Age: 72
Discharge: HOME | End: 2017-08-08
Payer: MEDICARE

## 2017-08-08 ENCOUNTER — HOSPITAL ENCOUNTER (OUTPATIENT)
Dept: GENERAL RADIOLOGY | Age: 72
Discharge: HOME | End: 2017-08-08
Payer: MEDICARE

## 2017-08-08 VITALS — HEIGHT: 72 IN | BODY MASS INDEX: 28.71 KG/M2 | WEIGHT: 212 LBS

## 2017-08-08 LAB
ANION GAP SERPL CALCULATED.3IONS-SCNC: 13 MMOL/L (ref 7–19)
APTT: 29.1 SEC (ref 26–36.2)
BASOPHILS ABSOLUTE: 0.1 K/UL (ref 0–0.2)
BASOPHILS RELATIVE PERCENT: 0.7 % (ref 0–1)
BUN BLDV-MCNC: 23 MG/DL (ref 8–23)
CALCIUM SERPL-MCNC: 10.2 MG/DL (ref 8.8–10.2)
CHLORIDE BLD-SCNC: 101 MMOL/L (ref 98–111)
CO2: 26 MMOL/L (ref 22–29)
CREAT SERPL-MCNC: 1.2 MG/DL (ref 0.5–1.2)
EOSINOPHILS ABSOLUTE: 0.3 K/UL (ref 0–0.6)
EOSINOPHILS RELATIVE PERCENT: 3.7 % (ref 0–5)
GFR NON-AFRICAN AMERICAN: 60
GLUCOSE BLD-MCNC: 93 MG/DL (ref 74–109)
HCT VFR BLD CALC: 44.3 % (ref 42–52)
HEMOGLOBIN: 14.9 G/DL (ref 14–18)
INR BLD: 0.91 (ref 0.88–1.18)
LYMPHOCYTES ABSOLUTE: 2.1 K/UL (ref 1.1–4.5)
LYMPHOCYTES RELATIVE PERCENT: 27.4 % (ref 20–40)
MCH RBC QN AUTO: 34.3 PG (ref 27–31)
MCHC RBC AUTO-ENTMCNC: 33.6 G/DL (ref 33–37)
MCV RBC AUTO: 101.8 FL (ref 80–94)
MONOCYTES ABSOLUTE: 0.8 K/UL (ref 0–0.9)
MONOCYTES RELATIVE PERCENT: 10.1 % (ref 0–10)
NEUTROPHILS ABSOLUTE: 4.4 K/UL (ref 1.5–7.5)
NEUTROPHILS RELATIVE PERCENT: 57.8 % (ref 50–65)
PDW BLD-RTO: 13 % (ref 11.5–14.5)
PLATELET # BLD: 231 K/UL (ref 130–400)
PMV BLD AUTO: 11.5 FL (ref 9.4–12.4)
POTASSIUM SERPL-SCNC: 3.9 MMOL/L (ref 3.5–5)
PROTHROMBIN TIME: 12.1 SEC (ref 12–14.6)
RBC # BLD: 4.35 M/UL (ref 4.7–6.1)
SODIUM BLD-SCNC: 140 MMOL/L (ref 136–145)
WBC # BLD: 7.5 K/UL (ref 4.8–10.8)

## 2017-08-08 PROCEDURE — 80048 BASIC METABOLIC PNL TOTAL CA: CPT

## 2017-08-08 PROCEDURE — 85610 PROTHROMBIN TIME: CPT

## 2017-08-08 PROCEDURE — 93005 ELECTROCARDIOGRAM TRACING: CPT

## 2017-08-08 PROCEDURE — 85730 THROMBOPLASTIN TIME PARTIAL: CPT

## 2017-08-08 PROCEDURE — 87070 CULTURE OTHR SPECIMN AEROBIC: CPT

## 2017-08-08 PROCEDURE — 85025 COMPLETE CBC W/AUTO DIFF WBC: CPT

## 2017-08-08 PROCEDURE — 71020 XR CHEST STANDARD TWO VW: CPT

## 2017-08-09 ENCOUNTER — TELEPHONE (OUTPATIENT)
Dept: VASCULAR SURGERY | Age: 72
End: 2017-08-09

## 2017-08-09 LAB — MRSA CULTURE ONLY: NORMAL

## 2017-08-10 LAB
EKG P AXIS: 44 DEGREES
EKG P-R INTERVAL: 200 MS
EKG Q-T INTERVAL: 412 MS
EKG QRS DURATION: 120 MS
EKG QTC CALCULATION (BAZETT): 404 MS
EKG T AXIS: 53 DEGREES

## 2017-08-14 DIAGNOSIS — Z01.818 PRE-OP TESTING: Primary | ICD-10-CM

## 2017-08-16 ENCOUNTER — APPOINTMENT (OUTPATIENT)
Dept: GENERAL RADIOLOGY | Age: 72
DRG: 271 | End: 2017-08-16
Attending: SURGERY
Payer: MEDICARE

## 2017-08-16 ENCOUNTER — ANESTHESIA EVENT (OUTPATIENT)
Dept: OPERATING ROOM | Age: 72
DRG: 271 | End: 2017-08-16
Payer: MEDICARE

## 2017-08-16 ENCOUNTER — ANESTHESIA (OUTPATIENT)
Dept: OPERATING ROOM | Age: 72
DRG: 271 | End: 2017-08-16
Payer: MEDICARE

## 2017-08-16 ENCOUNTER — HOSPITAL ENCOUNTER (INPATIENT)
Age: 72
LOS: 4 days | Discharge: HOME HEALTH | DRG: 271 | End: 2017-08-20
Attending: SURGERY | Admitting: SURGERY
Payer: MEDICARE

## 2017-08-16 ENCOUNTER — HOSPITAL ENCOUNTER (OUTPATIENT)
Dept: VASCULAR LAB | Age: 72
Discharge: HOME | DRG: 271 | End: 2017-08-16
Payer: MEDICARE

## 2017-08-16 ENCOUNTER — APPOINTMENT (OUTPATIENT)
Dept: INTERVENTIONAL RADIOLOGY/VASCULAR | Age: 72
DRG: 271 | End: 2017-08-16
Attending: SURGERY
Payer: MEDICARE

## 2017-08-16 VITALS
TEMPERATURE: 97 F | SYSTOLIC BLOOD PRESSURE: 144 MMHG | RESPIRATION RATE: 17 BRPM | OXYGEN SATURATION: 100 % | DIASTOLIC BLOOD PRESSURE: 66 MMHG

## 2017-08-16 VITALS
RESPIRATION RATE: 3 BRPM | SYSTOLIC BLOOD PRESSURE: 89 MMHG | DIASTOLIC BLOOD PRESSURE: 39 MMHG | OXYGEN SATURATION: 100 % | TEMPERATURE: 97.2 F

## 2017-08-16 DIAGNOSIS — Z01.818 PRE-OP TESTING: ICD-10-CM

## 2017-08-16 LAB
ABO/RH: NORMAL
ANTIBODY SCREEN: NORMAL
POC ACT LR: 201 SEC

## 2017-08-16 PROCEDURE — 3700000000 HC ANESTHESIA ATTENDED CARE: Performed by: SURGERY

## 2017-08-16 PROCEDURE — 6370000000 HC RX 637 (ALT 250 FOR IP): Performed by: SURGERY

## 2017-08-16 PROCEDURE — 04UL0KZ SUPPLEMENT LEFT FEMORAL ARTERY WITH NONAUTOLOGOUS TISSUE SUBSTITUTE, OPEN APPROACH: ICD-10-PCS | Performed by: SURGERY

## 2017-08-16 PROCEDURE — C1768 GRAFT, VASCULAR: HCPCS | Performed by: SURGERY

## 2017-08-16 PROCEDURE — 7100000001 HC PACU RECOVERY - ADDTL 15 MIN: Performed by: SURGERY

## 2017-08-16 PROCEDURE — C1874 STENT, COATED/COV W/DEL SYS: HCPCS | Performed by: SURGERY

## 2017-08-16 PROCEDURE — 6360000002 HC RX W HCPCS: Performed by: SURGERY

## 2017-08-16 PROCEDURE — 2500000003 HC RX 250 WO HCPCS: Performed by: NURSE ANESTHETIST, CERTIFIED REGISTERED

## 2017-08-16 PROCEDURE — 047J3D6: ICD-10-PCS | Performed by: SURGERY

## 2017-08-16 PROCEDURE — 3700000001 HC ADD 15 MINUTES (ANESTHESIA): Performed by: SURGERY

## 2017-08-16 PROCEDURE — 85347 COAGULATION TIME ACTIVATED: CPT

## 2017-08-16 PROCEDURE — C1876 STENT, NON-COA/NON-COV W/DEL: HCPCS | Performed by: SURGERY

## 2017-08-16 PROCEDURE — 37223 PR REVSC OPN/PRQ ILIAC ART W/STNT & ANGIOP IPSILATL: CPT | Performed by: SURGERY

## 2017-08-16 PROCEDURE — 04UJ0KZ SUPPLEMENT LEFT EXTERNAL ILIAC ARTERY WITH NONAUTOLOGOUS TISSUE SUBSTITUTE, OPEN APPROACH: ICD-10-PCS | Performed by: SURGERY

## 2017-08-16 PROCEDURE — 6360000002 HC RX W HCPCS: Performed by: NURSE ANESTHETIST, CERTIFIED REGISTERED

## 2017-08-16 PROCEDURE — 36620 INSERTION CATHETER ARTERY: CPT | Performed by: NURSE ANESTHETIST, CERTIFIED REGISTERED

## 2017-08-16 PROCEDURE — C1887 CATHETER, GUIDING: HCPCS | Performed by: SURGERY

## 2017-08-16 PROCEDURE — 04CL0ZZ EXTIRPATION OF MATTER FROM LEFT FEMORAL ARTERY, OPEN APPROACH: ICD-10-PCS | Performed by: SURGERY

## 2017-08-16 PROCEDURE — 88304 TISSUE EXAM BY PATHOLOGIST: CPT

## 2017-08-16 PROCEDURE — 2720000000 HC MISC SURG SUPPLY STERILE $0-50: Performed by: SURGERY

## 2017-08-16 PROCEDURE — C1757 CATH, THROMBECTOMY/EMBOLECT: HCPCS | Performed by: SURGERY

## 2017-08-16 PROCEDURE — 2580000003 HC RX 258: Performed by: SURGERY

## 2017-08-16 PROCEDURE — 86850 RBC ANTIBODY SCREEN: CPT

## 2017-08-16 PROCEDURE — A6258 TRANSPARENT FILM >16<=48 IN: HCPCS | Performed by: SURGERY

## 2017-08-16 PROCEDURE — 6360000002 HC RX W HCPCS: Performed by: NURSE PRACTITIONER

## 2017-08-16 PROCEDURE — B41DYZZ FLUOROSCOPY OF AORTA AND BILATERAL LOWER EXTREMITY ARTERIES USING OTHER CONTRAST: ICD-10-PCS | Performed by: SURGERY

## 2017-08-16 PROCEDURE — 71020 XR CHEST STANDARD TWO VW: CPT

## 2017-08-16 PROCEDURE — 37221 PR REVSC OPN/PRQ ILIAC ART W/STNT PLMT & ANGIOPLSTY: CPT | Performed by: SURGERY

## 2017-08-16 PROCEDURE — C1725 CATH, TRANSLUMIN NON-LASER: HCPCS | Performed by: SURGERY

## 2017-08-16 PROCEDURE — 6360000002 HC RX W HCPCS

## 2017-08-16 PROCEDURE — 0JCM0ZZ EXTIRPATION OF MATTER FROM LEFT UPPER LEG SUBCUTANEOUS TISSUE AND FASCIA, OPEN APPROACH: ICD-10-PCS | Performed by: SURGERY

## 2017-08-16 PROCEDURE — 35302 RECHANNELING OF ARTERY: CPT | Performed by: SURGERY

## 2017-08-16 PROCEDURE — C1760 CLOSURE DEV, VASC: HCPCS | Performed by: SURGERY

## 2017-08-16 PROCEDURE — C1894 INTRO/SHEATH, NON-LASER: HCPCS | Performed by: SURGERY

## 2017-08-16 PROCEDURE — 7100000000 HC PACU RECOVERY - FIRST 15 MIN: Performed by: SURGERY

## 2017-08-16 PROCEDURE — 0Y3D0ZZ CONTROL BLEEDING IN LEFT UPPER LEG, OPEN APPROACH: ICD-10-PCS | Performed by: SURGERY

## 2017-08-16 PROCEDURE — C1769 GUIDE WIRE: HCPCS | Performed by: SURGERY

## 2017-08-16 PROCEDURE — 2720000001 HC MISC SURG SUPPLY STERILE $51-500: Performed by: SURGERY

## 2017-08-16 PROCEDURE — 04CJ3ZZ EXTIRPATION OF MATTER FROM LEFT EXTERNAL ILIAC ARTERY, PERCUTANEOUS APPROACH: ICD-10-PCS | Performed by: SURGERY

## 2017-08-16 PROCEDURE — 047D3D6: ICD-10-PCS | Performed by: SURGERY

## 2017-08-16 PROCEDURE — 3600000005 HC SURGERY LEVEL 5 BASE: Performed by: SURGERY

## 2017-08-16 PROCEDURE — 047H3DZ DILATION OF RIGHT EXTERNAL ILIAC ARTERY WITH INTRALUMINAL DEVICE, PERCUTANEOUS APPROACH: ICD-10-PCS | Performed by: SURGERY

## 2017-08-16 PROCEDURE — 1210000000 HC MED SURG R&B

## 2017-08-16 PROCEDURE — 36415 COLL VENOUS BLD VENIPUNCTURE: CPT

## 2017-08-16 PROCEDURE — 6370000000 HC RX 637 (ALT 250 FOR IP): Performed by: NURSE PRACTITIONER

## 2017-08-16 PROCEDURE — 86901 BLOOD TYPING SEROLOGIC RH(D): CPT

## 2017-08-16 PROCEDURE — 2580000003 HC RX 258: Performed by: NURSE ANESTHETIST, CERTIFIED REGISTERED

## 2017-08-16 PROCEDURE — 35355 RECHANNELING OF ARTERY: CPT | Performed by: SURGERY

## 2017-08-16 PROCEDURE — 04CJ0ZZ EXTIRPATION OF MATTER FROM LEFT EXTERNAL ILIAC ARTERY, OPEN APPROACH: ICD-10-PCS | Performed by: SURGERY

## 2017-08-16 PROCEDURE — 86900 BLOOD TYPING SEROLOGIC ABO: CPT

## 2017-08-16 PROCEDURE — 3600000015 HC SURGERY LEVEL 5 ADDTL 15MIN: Performed by: SURGERY

## 2017-08-16 DEVICE — IMPLANTABLE DEVICE: Type: IMPLANTABLE DEVICE | Site: ARTERIAL | Status: FUNCTIONAL

## 2017-08-16 DEVICE — PERCLOSE PROGLIDE™ SUTURE-MEDIATED CLOSURE SYSTEM
Type: IMPLANTABLE DEVICE | Site: ARTERIAL | Status: FUNCTIONAL
Brand: PERCLOSE PROGLIDE™

## 2017-08-16 DEVICE — PATCH BIOLOGIC VASC 1CM WX14CM L .55MM THICKNESSXENOSURE: Type: IMPLANTABLE DEVICE | Site: ARTERIAL | Status: FUNCTIONAL

## 2017-08-16 DEVICE — PREMOUNTED STENT SYSTEM
Type: IMPLANTABLE DEVICE | Site: ARTERIAL | Status: FUNCTIONAL
Brand: EXPRESS® LD ILIAC / BILIARY

## 2017-08-16 RX ORDER — CLONIDINE HYDROCHLORIDE 0.1 MG/1
0.1 TABLET ORAL
Status: DISCONTINUED | OUTPATIENT
Start: 2017-08-16 | End: 2017-08-16 | Stop reason: SDUPTHER

## 2017-08-16 RX ORDER — MORPHINE SULFATE 4 MG/ML
4 INJECTION, SOLUTION INTRAMUSCULAR; INTRAVENOUS EVERY 5 MIN PRN
Status: DISCONTINUED | OUTPATIENT
Start: 2017-08-16 | End: 2017-08-16 | Stop reason: HOSPADM

## 2017-08-16 RX ORDER — ENALAPRILAT 2.5 MG/2ML
1.25 INJECTION INTRAVENOUS
Status: DISCONTINUED | OUTPATIENT
Start: 2017-08-16 | End: 2017-08-16 | Stop reason: HOSPADM

## 2017-08-16 RX ORDER — SODIUM CHLORIDE 0.9 % (FLUSH) 0.9 %
10 SYRINGE (ML) INJECTION PRN
Status: DISCONTINUED | OUTPATIENT
Start: 2017-08-16 | End: 2017-08-20 | Stop reason: HOSPADM

## 2017-08-16 RX ORDER — SODIUM CHLORIDE 0.9 % (FLUSH) 0.9 %
10 SYRINGE (ML) INJECTION PRN
Status: DISCONTINUED | OUTPATIENT
Start: 2017-08-16 | End: 2017-08-16 | Stop reason: HOSPADM

## 2017-08-16 RX ORDER — ASPIRIN 81 MG/1
81 TABLET ORAL ONCE
Status: COMPLETED | OUTPATIENT
Start: 2017-08-16 | End: 2017-08-16

## 2017-08-16 RX ORDER — SODIUM CHLORIDE 9 MG/ML
INJECTION, SOLUTION INTRAVENOUS CONTINUOUS
Status: DISCONTINUED | OUTPATIENT
Start: 2017-08-17 | End: 2017-08-16 | Stop reason: SDUPTHER

## 2017-08-16 RX ORDER — MULTIVIT-MIN/FA/LYCOPEN/LUTEIN .4-300-25
1 TABLET ORAL DAILY
Status: DISCONTINUED | OUTPATIENT
Start: 2017-08-17 | End: 2017-08-20 | Stop reason: HOSPADM

## 2017-08-16 RX ORDER — HYDROCODONE BITARTRATE AND ACETAMINOPHEN 5; 325 MG/1; MG/1
2 TABLET ORAL EVERY 4 HOURS PRN
Status: DISCONTINUED | OUTPATIENT
Start: 2017-08-16 | End: 2017-08-16 | Stop reason: SDUPTHER

## 2017-08-16 RX ORDER — HYDROCODONE BITARTRATE AND ACETAMINOPHEN 5; 325 MG/1; MG/1
1 TABLET ORAL EVERY 4 HOURS PRN
Status: DISCONTINUED | OUTPATIENT
Start: 2017-08-16 | End: 2017-08-20 | Stop reason: HOSPADM

## 2017-08-16 RX ORDER — ROCURONIUM BROMIDE 10 MG/ML
INJECTION, SOLUTION INTRAVENOUS PRN
Status: DISCONTINUED | OUTPATIENT
Start: 2017-08-16 | End: 2017-08-16 | Stop reason: SDUPTHER

## 2017-08-16 RX ORDER — ASPIRIN 81 MG/1
81 TABLET ORAL DAILY
Status: DISCONTINUED | OUTPATIENT
Start: 2017-08-17 | End: 2017-08-16 | Stop reason: SDUPTHER

## 2017-08-16 RX ORDER — SODIUM CHLORIDE 0.9 % (FLUSH) 0.9 %
10 SYRINGE (ML) INJECTION PRN
Status: DISCONTINUED | OUTPATIENT
Start: 2017-08-16 | End: 2017-08-16 | Stop reason: SDUPTHER

## 2017-08-16 RX ORDER — HYDROCODONE BITARTRATE AND ACETAMINOPHEN 5; 325 MG/1; MG/1
2 TABLET ORAL EVERY 4 HOURS PRN
Status: DISCONTINUED | OUTPATIENT
Start: 2017-08-16 | End: 2017-08-20 | Stop reason: HOSPADM

## 2017-08-16 RX ORDER — UBIDECARENONE 100 MG
100 CAPSULE ORAL DAILY
Status: DISCONTINUED | OUTPATIENT
Start: 2017-08-17 | End: 2017-08-20 | Stop reason: HOSPADM

## 2017-08-16 RX ORDER — METOCLOPRAMIDE HYDROCHLORIDE 5 MG/ML
10 INJECTION INTRAMUSCULAR; INTRAVENOUS
Status: DISCONTINUED | OUTPATIENT
Start: 2017-08-16 | End: 2017-08-16 | Stop reason: HOSPADM

## 2017-08-16 RX ORDER — CLOPIDOGREL BISULFATE 75 MG/1
75 TABLET ORAL DAILY
Status: DISCONTINUED | OUTPATIENT
Start: 2017-08-17 | End: 2017-08-20 | Stop reason: HOSPADM

## 2017-08-16 RX ORDER — SODIUM CHLORIDE 0.9 % (FLUSH) 0.9 %
10 SYRINGE (ML) INJECTION EVERY 12 HOURS SCHEDULED
Status: DISCONTINUED | OUTPATIENT
Start: 2017-08-17 | End: 2017-08-16 | Stop reason: SDUPTHER

## 2017-08-16 RX ORDER — LISINOPRIL 20 MG/1
20 TABLET ORAL DAILY
Status: DISCONTINUED | OUTPATIENT
Start: 2017-08-17 | End: 2017-08-20 | Stop reason: HOSPADM

## 2017-08-16 RX ORDER — UBIDECARENONE 30 MG
1 CAPSULE ORAL DAILY
Status: DISCONTINUED | OUTPATIENT
Start: 2017-08-17 | End: 2017-08-16 | Stop reason: SDUPTHER

## 2017-08-16 RX ORDER — EPHEDRINE SULFATE 50 MG/ML
INJECTION, SOLUTION INTRAVENOUS PRN
Status: DISCONTINUED | OUTPATIENT
Start: 2017-08-16 | End: 2017-08-16 | Stop reason: SDUPTHER

## 2017-08-16 RX ORDER — ACETAMINOPHEN 325 MG/1
650 TABLET ORAL EVERY 4 HOURS PRN
Status: DISCONTINUED | OUTPATIENT
Start: 2017-08-16 | End: 2017-08-20 | Stop reason: HOSPADM

## 2017-08-16 RX ORDER — ONDANSETRON 2 MG/ML
INJECTION INTRAMUSCULAR; INTRAVENOUS PRN
Status: DISCONTINUED | OUTPATIENT
Start: 2017-08-16 | End: 2017-08-16 | Stop reason: SDUPTHER

## 2017-08-16 RX ORDER — ATENOLOL 25 MG/1
25 TABLET ORAL DAILY
Status: DISCONTINUED | OUTPATIENT
Start: 2017-08-17 | End: 2017-08-20 | Stop reason: HOSPADM

## 2017-08-16 RX ORDER — HEPARIN SODIUM 1000 [USP'U]/ML
INJECTION, SOLUTION INTRAVENOUS; SUBCUTANEOUS PRN
Status: DISCONTINUED | OUTPATIENT
Start: 2017-08-16 | End: 2017-08-16 | Stop reason: SDUPTHER

## 2017-08-16 RX ORDER — ONDANSETRON 2 MG/ML
4 INJECTION INTRAMUSCULAR; INTRAVENOUS EVERY 6 HOURS PRN
Status: DISCONTINUED | OUTPATIENT
Start: 2017-08-16 | End: 2017-08-20 | Stop reason: HOSPADM

## 2017-08-16 RX ORDER — SODIUM CHLORIDE, SODIUM LACTATE, POTASSIUM CHLORIDE, CALCIUM CHLORIDE 600; 310; 30; 20 MG/100ML; MG/100ML; MG/100ML; MG/100ML
INJECTION, SOLUTION INTRAVENOUS CONTINUOUS
Status: DISCONTINUED | OUTPATIENT
Start: 2017-08-16 | End: 2017-08-16

## 2017-08-16 RX ORDER — ATORVASTATIN CALCIUM 40 MG/1
40 TABLET, FILM COATED ORAL NIGHTLY
Status: DISCONTINUED | OUTPATIENT
Start: 2017-08-17 | End: 2017-08-20 | Stop reason: HOSPADM

## 2017-08-16 RX ORDER — PROPOFOL 10 MG/ML
INJECTION, EMULSION INTRAVENOUS PRN
Status: DISCONTINUED | OUTPATIENT
Start: 2017-08-16 | End: 2017-08-16 | Stop reason: SDUPTHER

## 2017-08-16 RX ORDER — DIPHENHYDRAMINE HYDROCHLORIDE 50 MG/ML
12.5 INJECTION INTRAMUSCULAR; INTRAVENOUS
Status: DISCONTINUED | OUTPATIENT
Start: 2017-08-16 | End: 2017-08-16 | Stop reason: HOSPADM

## 2017-08-16 RX ORDER — ONDANSETRON 2 MG/ML
4 INJECTION INTRAMUSCULAR; INTRAVENOUS EVERY 6 HOURS PRN
Status: DISCONTINUED | OUTPATIENT
Start: 2017-08-16 | End: 2017-08-16 | Stop reason: SDUPTHER

## 2017-08-16 RX ORDER — MIDAZOLAM HYDROCHLORIDE 1 MG/ML
INJECTION INTRAMUSCULAR; INTRAVENOUS
Status: COMPLETED
Start: 2017-08-16 | End: 2017-08-16

## 2017-08-16 RX ORDER — NITROGLYCERIN 0.4 MG/1
0.4 TABLET SUBLINGUAL EVERY 5 MIN PRN
Status: DISCONTINUED | OUTPATIENT
Start: 2017-08-16 | End: 2017-08-20 | Stop reason: HOSPADM

## 2017-08-16 RX ORDER — SODIUM CHLORIDE 9 MG/ML
INJECTION, SOLUTION INTRAVENOUS CONTINUOUS
Status: DISCONTINUED | OUTPATIENT
Start: 2017-08-17 | End: 2017-08-20 | Stop reason: HOSPADM

## 2017-08-16 RX ORDER — LIDOCAINE HYDROCHLORIDE 10 MG/ML
INJECTION, SOLUTION EPIDURAL; INFILTRATION; INTRACAUDAL; PERINEURAL PRN
Status: DISCONTINUED | OUTPATIENT
Start: 2017-08-16 | End: 2017-08-16 | Stop reason: SDUPTHER

## 2017-08-16 RX ORDER — PROMETHAZINE HYDROCHLORIDE 25 MG/ML
6.25 INJECTION, SOLUTION INTRAMUSCULAR; INTRAVENOUS
Status: DISCONTINUED | OUTPATIENT
Start: 2017-08-16 | End: 2017-08-16 | Stop reason: HOSPADM

## 2017-08-16 RX ORDER — FENTANYL CITRATE 50 UG/ML
INJECTION, SOLUTION INTRAMUSCULAR; INTRAVENOUS PRN
Status: DISCONTINUED | OUTPATIENT
Start: 2017-08-16 | End: 2017-08-16 | Stop reason: SDUPTHER

## 2017-08-16 RX ORDER — ACETAMINOPHEN 325 MG/1
650 TABLET ORAL EVERY 4 HOURS PRN
Status: DISCONTINUED | OUTPATIENT
Start: 2017-08-16 | End: 2017-08-16 | Stop reason: SDUPTHER

## 2017-08-16 RX ORDER — LIDOCAINE HYDROCHLORIDE 10 MG/ML
INJECTION, SOLUTION INFILTRATION; PERINEURAL PRN
Status: DISCONTINUED | OUTPATIENT
Start: 2017-08-16 | End: 2017-08-16 | Stop reason: SDUPTHER

## 2017-08-16 RX ORDER — DEXAMETHASONE SODIUM PHOSPHATE 4 MG/ML
INJECTION, SOLUTION INTRA-ARTICULAR; INTRALESIONAL; INTRAMUSCULAR; INTRAVENOUS; SOFT TISSUE PRN
Status: DISCONTINUED | OUTPATIENT
Start: 2017-08-16 | End: 2017-08-16 | Stop reason: SDUPTHER

## 2017-08-16 RX ORDER — HYDROCODONE BITARTRATE AND ACETAMINOPHEN 5; 325 MG/1; MG/1
1 TABLET ORAL EVERY 4 HOURS PRN
Status: DISCONTINUED | OUTPATIENT
Start: 2017-08-16 | End: 2017-08-16 | Stop reason: SDUPTHER

## 2017-08-16 RX ORDER — SODIUM CHLORIDE, SODIUM LACTATE, POTASSIUM CHLORIDE, CALCIUM CHLORIDE 600; 310; 30; 20 MG/100ML; MG/100ML; MG/100ML; MG/100ML
INJECTION, SOLUTION INTRAVENOUS CONTINUOUS PRN
Status: DISCONTINUED | OUTPATIENT
Start: 2017-08-16 | End: 2017-08-16 | Stop reason: SDUPTHER

## 2017-08-16 RX ORDER — HYDRALAZINE HYDROCHLORIDE 20 MG/ML
5 INJECTION INTRAMUSCULAR; INTRAVENOUS EVERY 10 MIN PRN
Status: DISCONTINUED | OUTPATIENT
Start: 2017-08-16 | End: 2017-08-16 | Stop reason: HOSPADM

## 2017-08-16 RX ORDER — ASPIRIN 325 MG
325 TABLET ORAL DAILY
Status: DISCONTINUED | OUTPATIENT
Start: 2017-08-17 | End: 2017-08-17

## 2017-08-16 RX ORDER — SODIUM CHLORIDE 0.9 % (FLUSH) 0.9 %
10 SYRINGE (ML) INJECTION EVERY 12 HOURS SCHEDULED
Status: DISCONTINUED | OUTPATIENT
Start: 2017-08-17 | End: 2017-08-20 | Stop reason: HOSPADM

## 2017-08-16 RX ORDER — AMLODIPINE BESYLATE 5 MG/1
5 TABLET ORAL DAILY
Status: DISCONTINUED | OUTPATIENT
Start: 2017-08-17 | End: 2017-08-20 | Stop reason: HOSPADM

## 2017-08-16 RX ORDER — MORPHINE SULFATE 4 MG/ML
2 INJECTION, SOLUTION INTRAMUSCULAR; INTRAVENOUS EVERY 5 MIN PRN
Status: DISCONTINUED | OUTPATIENT
Start: 2017-08-16 | End: 2017-08-16 | Stop reason: HOSPADM

## 2017-08-16 RX ORDER — SODIUM CHLORIDE 0.9 % (FLUSH) 0.9 %
10 SYRINGE (ML) INJECTION EVERY 12 HOURS SCHEDULED
Status: DISCONTINUED | OUTPATIENT
Start: 2017-08-16 | End: 2017-08-16 | Stop reason: HOSPADM

## 2017-08-16 RX ORDER — MEPERIDINE HYDROCHLORIDE 50 MG/ML
12.5 INJECTION INTRAMUSCULAR; INTRAVENOUS; SUBCUTANEOUS EVERY 5 MIN PRN
Status: DISCONTINUED | OUTPATIENT
Start: 2017-08-16 | End: 2017-08-16 | Stop reason: HOSPADM

## 2017-08-16 RX ORDER — LABETALOL HYDROCHLORIDE 5 MG/ML
5 INJECTION, SOLUTION INTRAVENOUS EVERY 10 MIN PRN
Status: DISCONTINUED | OUTPATIENT
Start: 2017-08-16 | End: 2017-08-16 | Stop reason: HOSPADM

## 2017-08-16 RX ORDER — CLONIDINE HYDROCHLORIDE 0.1 MG/1
0.1 TABLET ORAL
Status: DISCONTINUED | OUTPATIENT
Start: 2017-08-16 | End: 2017-08-20 | Stop reason: HOSPADM

## 2017-08-16 RX ADMIN — Medication 2 G: at 14:05

## 2017-08-16 RX ADMIN — HYDROMORPHONE HYDROCHLORIDE 0.25 MG: 1 INJECTION, SOLUTION INTRAMUSCULAR; INTRAVENOUS; SUBCUTANEOUS at 17:01

## 2017-08-16 RX ADMIN — HYDROMORPHONE HYDROCHLORIDE 0.5 MG: 1 INJECTION, SOLUTION INTRAMUSCULAR; INTRAVENOUS; SUBCUTANEOUS at 19:47

## 2017-08-16 RX ADMIN — HEPARIN SODIUM 1000 UNITS: 1000 INJECTION, SOLUTION INTRAVENOUS; SUBCUTANEOUS at 15:38

## 2017-08-16 RX ADMIN — PHENYLEPHRINE HYDROCHLORIDE 200 MCG: 10 INJECTION INTRAVENOUS at 20:50

## 2017-08-16 RX ADMIN — DEXAMETHASONE SODIUM PHOSPHATE 4 MG: 4 INJECTION, SOLUTION INTRAMUSCULAR; INTRAVENOUS at 14:14

## 2017-08-16 RX ADMIN — SODIUM CHLORIDE, SODIUM LACTATE, POTASSIUM CHLORIDE, AND CALCIUM CHLORIDE: 600; 310; 30; 20 INJECTION, SOLUTION INTRAVENOUS at 18:29

## 2017-08-16 RX ADMIN — HYDROMORPHONE HYDROCHLORIDE 0.25 MG: 1 INJECTION, SOLUTION INTRAMUSCULAR; INTRAVENOUS; SUBCUTANEOUS at 16:57

## 2017-08-16 RX ADMIN — HEPARIN SODIUM 2000 UNITS: 1000 INJECTION, SOLUTION INTRAVENOUS; SUBCUTANEOUS at 15:57

## 2017-08-16 RX ADMIN — ONDANSETRON HYDROCHLORIDE 4 MG: 2 INJECTION, SOLUTION INTRAVENOUS at 17:48

## 2017-08-16 RX ADMIN — PHENYLEPHRINE HYDROCHLORIDE 200 MCG: 10 INJECTION INTRAVENOUS at 21:05

## 2017-08-16 RX ADMIN — HYDROMORPHONE HYDROCHLORIDE 0.5 MG: 1 INJECTION, SOLUTION INTRAMUSCULAR; INTRAVENOUS; SUBCUTANEOUS at 14:24

## 2017-08-16 RX ADMIN — ASPIRIN 81 MG: 81 TABLET, COATED ORAL at 08:53

## 2017-08-16 RX ADMIN — SODIUM CHLORIDE, SODIUM LACTATE, POTASSIUM CHLORIDE, AND CALCIUM CHLORIDE: 600; 310; 30; 20 INJECTION, SOLUTION INTRAVENOUS at 08:54

## 2017-08-16 RX ADMIN — PROPOFOL 150 MG: 10 INJECTION, EMULSION INTRAVENOUS at 13:59

## 2017-08-16 RX ADMIN — HYDROMORPHONE HYDROCHLORIDE 0.5 MG: 1 INJECTION, SOLUTION INTRAMUSCULAR; INTRAVENOUS; SUBCUTANEOUS at 19:21

## 2017-08-16 RX ADMIN — EPHEDRINE SULFATE 5 MG: 50 INJECTION, SOLUTION INTRAMUSCULAR; INTRAVENOUS; SUBCUTANEOUS at 14:55

## 2017-08-16 RX ADMIN — SUGAMMADEX 200 MG: 100 INJECTION, SOLUTION INTRAVENOUS at 21:30

## 2017-08-16 RX ADMIN — HEPARIN SODIUM 5000 UNITS: 1000 INJECTION, SOLUTION INTRAVENOUS; SUBCUTANEOUS at 14:42

## 2017-08-16 RX ADMIN — EPHEDRINE SULFATE 5 MG: 50 INJECTION, SOLUTION INTRAMUSCULAR; INTRAVENOUS; SUBCUTANEOUS at 16:33

## 2017-08-16 RX ADMIN — EPHEDRINE SULFATE 5 MG: 50 INJECTION, SOLUTION INTRAMUSCULAR; INTRAVENOUS; SUBCUTANEOUS at 15:10

## 2017-08-16 RX ADMIN — FENTANYL CITRATE 100 MCG: 50 INJECTION INTRAMUSCULAR; INTRAVENOUS at 13:58

## 2017-08-16 RX ADMIN — ROCURONIUM BROMIDE 50 MG: 10 INJECTION INTRAVENOUS at 13:59

## 2017-08-16 RX ADMIN — SODIUM CHLORIDE, SODIUM LACTATE, POTASSIUM CHLORIDE, AND CALCIUM CHLORIDE: 600; 310; 30; 20 INJECTION, SOLUTION INTRAVENOUS at 15:59

## 2017-08-16 RX ADMIN — FENTANYL CITRATE 50 MCG: 50 INJECTION INTRAMUSCULAR; INTRAVENOUS at 14:16

## 2017-08-16 RX ADMIN — SODIUM CHLORIDE, SODIUM LACTATE, POTASSIUM CHLORIDE, AND CALCIUM CHLORIDE: 600; 310; 30; 20 INJECTION, SOLUTION INTRAVENOUS at 14:15

## 2017-08-16 RX ADMIN — SUGAMMADEX 200 MG: 100 INJECTION, SOLUTION INTRAVENOUS at 18:18

## 2017-08-16 RX ADMIN — LIDOCAINE HYDROCHLORIDE 50 MG: 10 INJECTION, SOLUTION EPIDURAL; INFILTRATION; INTRACAUDAL; PERINEURAL at 20:43

## 2017-08-16 RX ADMIN — FENTANYL CITRATE 100 MCG: 50 INJECTION INTRAMUSCULAR; INTRAVENOUS at 20:41

## 2017-08-16 RX ADMIN — PROPOFOL 150 MG: 10 INJECTION, EMULSION INTRAVENOUS at 20:43

## 2017-08-16 RX ADMIN — EPHEDRINE SULFATE 10 MG: 50 INJECTION, SOLUTION INTRAMUSCULAR; INTRAVENOUS; SUBCUTANEOUS at 21:31

## 2017-08-16 RX ADMIN — EPHEDRINE SULFATE 5 MG: 50 INJECTION, SOLUTION INTRAMUSCULAR; INTRAVENOUS; SUBCUTANEOUS at 14:13

## 2017-08-16 RX ADMIN — HYDROMORPHONE HYDROCHLORIDE 0.25 MG: 1 INJECTION, SOLUTION INTRAMUSCULAR; INTRAVENOUS; SUBCUTANEOUS at 18:26

## 2017-08-16 RX ADMIN — EPHEDRINE SULFATE 5 MG: 50 INJECTION, SOLUTION INTRAMUSCULAR; INTRAVENOUS; SUBCUTANEOUS at 16:21

## 2017-08-16 RX ADMIN — EPHEDRINE SULFATE 5 MG: 50 INJECTION, SOLUTION INTRAMUSCULAR; INTRAVENOUS; SUBCUTANEOUS at 14:15

## 2017-08-16 RX ADMIN — FENTANYL CITRATE 50 MCG: 50 INJECTION INTRAMUSCULAR; INTRAVENOUS at 14:20

## 2017-08-16 RX ADMIN — HYDROMORPHONE HYDROCHLORIDE 0.5 MG: 1 INJECTION, SOLUTION INTRAMUSCULAR; INTRAVENOUS; SUBCUTANEOUS at 15:48

## 2017-08-16 RX ADMIN — ROCURONIUM BROMIDE 30 MG: 10 INJECTION INTRAVENOUS at 20:43

## 2017-08-16 RX ADMIN — HYDROMORPHONE HYDROCHLORIDE 0.5 MG: 1 INJECTION, SOLUTION INTRAMUSCULAR; INTRAVENOUS; SUBCUTANEOUS at 19:15

## 2017-08-16 RX ADMIN — EPHEDRINE SULFATE 5 MG: 50 INJECTION, SOLUTION INTRAMUSCULAR; INTRAVENOUS; SUBCUTANEOUS at 16:11

## 2017-08-16 RX ADMIN — FENTANYL CITRATE 50 MCG: 50 INJECTION INTRAMUSCULAR; INTRAVENOUS at 14:18

## 2017-08-16 RX ADMIN — MIDAZOLAM 2 MG: 1 INJECTION INTRAMUSCULAR; INTRAVENOUS at 12:35

## 2017-08-16 RX ADMIN — PHENYLEPHRINE HYDROCHLORIDE 300 MCG: 10 INJECTION INTRAVENOUS at 20:59

## 2017-08-16 RX ADMIN — LIDOCAINE HYDROCHLORIDE 5 ML: 10 INJECTION, SOLUTION INFILTRATION; PERINEURAL at 13:59

## 2017-08-16 RX ADMIN — HYDROMORPHONE HYDROCHLORIDE 0.25 MG: 1 INJECTION, SOLUTION INTRAMUSCULAR; INTRAVENOUS; SUBCUTANEOUS at 17:15

## 2017-08-16 RX ADMIN — SODIUM CHLORIDE, SODIUM LACTATE, POTASSIUM CHLORIDE, AND CALCIUM CHLORIDE: 600; 310; 30; 20 INJECTION, SOLUTION INTRAVENOUS at 20:40

## 2017-08-16 RX ADMIN — EPHEDRINE SULFATE 5 MG: 50 INJECTION, SOLUTION INTRAMUSCULAR; INTRAVENOUS; SUBCUTANEOUS at 16:13

## 2017-08-16 RX ADMIN — EPHEDRINE SULFATE 5 MG: 50 INJECTION, SOLUTION INTRAMUSCULAR; INTRAVENOUS; SUBCUTANEOUS at 17:24

## 2017-08-16 ASSESSMENT — PAIN DESCRIPTION - LOCATION: LOCATION: LEG

## 2017-08-16 ASSESSMENT — PAIN SCALES - GENERAL
PAINLEVEL_OUTOF10: 7
PAINLEVEL_OUTOF10: 8
PAINLEVEL_OUTOF10: 6
PAINLEVEL_OUTOF10: 7
PAINLEVEL_OUTOF10: 0

## 2017-08-16 ASSESSMENT — PAIN DESCRIPTION - FREQUENCY: FREQUENCY: CONTINUOUS

## 2017-08-16 ASSESSMENT — PAIN DESCRIPTION - PAIN TYPE: TYPE: SURGICAL PAIN

## 2017-08-16 ASSESSMENT — PAIN DESCRIPTION - DESCRIPTORS: DESCRIPTORS: BURNING;OTHER (COMMENT)

## 2017-08-16 ASSESSMENT — PAIN DESCRIPTION - ONSET: ONSET: OTHER (COMMENT)

## 2017-08-16 ASSESSMENT — PAIN DESCRIPTION - ORIENTATION: ORIENTATION: LEFT;ANTERIOR;UPPER

## 2017-08-17 LAB
ANION GAP SERPL CALCULATED.3IONS-SCNC: 14 MMOL/L (ref 7–19)
BUN BLDV-MCNC: 27 MG/DL (ref 8–23)
CALCIUM SERPL-MCNC: 8.8 MG/DL (ref 8.8–10.2)
CHLORIDE BLD-SCNC: 102 MMOL/L (ref 98–111)
CO2: 24 MMOL/L (ref 22–29)
CREAT SERPL-MCNC: 1.6 MG/DL (ref 0.5–1.2)
GFR NON-AFRICAN AMERICAN: 43
GLUCOSE BLD-MCNC: 141 MG/DL (ref 74–109)
HCT VFR BLD CALC: 35 % (ref 42–52)
HEMOGLOBIN: 11.6 G/DL (ref 14–18)
MCH RBC QN AUTO: 34.6 PG (ref 27–31)
MCHC RBC AUTO-ENTMCNC: 33.1 G/DL (ref 33–37)
MCV RBC AUTO: 104.5 FL (ref 80–94)
PDW BLD-RTO: 13.2 % (ref 11.5–14.5)
PLATELET # BLD: 172 K/UL (ref 130–400)
PMV BLD AUTO: 12.1 FL (ref 9.4–12.4)
POTASSIUM SERPL-SCNC: 4.5 MMOL/L (ref 3.5–5)
RBC # BLD: 3.35 M/UL (ref 4.7–6.1)
SODIUM BLD-SCNC: 140 MMOL/L (ref 136–145)
WBC # BLD: 12.3 K/UL (ref 4.8–10.8)

## 2017-08-17 PROCEDURE — 36415 COLL VENOUS BLD VENIPUNCTURE: CPT

## 2017-08-17 PROCEDURE — 1210000000 HC MED SURG R&B

## 2017-08-17 PROCEDURE — 99024 POSTOP FOLLOW-UP VISIT: CPT | Performed by: SURGERY

## 2017-08-17 PROCEDURE — 94664 DEMO&/EVAL PT USE INHALER: CPT

## 2017-08-17 PROCEDURE — 2580000003 HC RX 258: Performed by: SURGERY

## 2017-08-17 PROCEDURE — 6370000000 HC RX 637 (ALT 250 FOR IP): Performed by: SURGERY

## 2017-08-17 PROCEDURE — 80048 BASIC METABOLIC PNL TOTAL CA: CPT

## 2017-08-17 PROCEDURE — 85027 COMPLETE CBC AUTOMATED: CPT

## 2017-08-17 PROCEDURE — 6360000002 HC RX W HCPCS: Performed by: SURGERY

## 2017-08-17 RX ORDER — ASPIRIN 325 MG
325 TABLET ORAL NIGHTLY
Status: DISCONTINUED | OUTPATIENT
Start: 2017-08-17 | End: 2017-08-20 | Stop reason: HOSPADM

## 2017-08-17 RX ADMIN — HYDROCODONE BITARTRATE AND ACETAMINOPHEN 1 TABLET: 5; 325 TABLET ORAL at 02:01

## 2017-08-17 RX ADMIN — SODIUM CHLORIDE: 9 INJECTION, SOLUTION INTRAVENOUS at 00:29

## 2017-08-17 RX ADMIN — HYDROMORPHONE HYDROCHLORIDE 0.5 MG: 1 INJECTION, SOLUTION INTRAMUSCULAR; INTRAVENOUS; SUBCUTANEOUS at 03:46

## 2017-08-17 RX ADMIN — HYDROCODONE BITARTRATE AND ACETAMINOPHEN 2 TABLET: 5; 325 TABLET ORAL at 06:11

## 2017-08-17 RX ADMIN — HYDROMORPHONE HYDROCHLORIDE 0.5 MG: 1 INJECTION, SOLUTION INTRAMUSCULAR; INTRAVENOUS; SUBCUTANEOUS at 00:09

## 2017-08-17 RX ADMIN — VITAMIN D, TAB 1000IU (100/BT) 1000 UNITS: 25 TAB at 08:01

## 2017-08-17 RX ADMIN — Medication 10 ML: at 21:34

## 2017-08-17 RX ADMIN — ATORVASTATIN CALCIUM 40 MG: 40 TABLET, FILM COATED ORAL at 00:10

## 2017-08-17 RX ADMIN — ATORVASTATIN CALCIUM 40 MG: 40 TABLET, FILM COATED ORAL at 21:29

## 2017-08-17 RX ADMIN — Medication 100 MG: at 08:19

## 2017-08-17 RX ADMIN — CLOPIDOGREL BISULFATE 75 MG: 75 TABLET, FILM COATED ORAL at 08:02

## 2017-08-17 RX ADMIN — SODIUM CHLORIDE: 9 INJECTION, SOLUTION INTRAVENOUS at 20:58

## 2017-08-17 RX ADMIN — HYDROCODONE BITARTRATE AND ACETAMINOPHEN 2 TABLET: 5; 325 TABLET ORAL at 21:29

## 2017-08-17 RX ADMIN — Medication 2 G: at 00:29

## 2017-08-17 RX ADMIN — Medication 1 TABLET: at 08:02

## 2017-08-17 RX ADMIN — ATENOLOL 25 MG: 25 TABLET ORAL at 08:02

## 2017-08-17 RX ADMIN — HYDROMORPHONE HYDROCHLORIDE 0.5 MG: 1 INJECTION, SOLUTION INTRAMUSCULAR; INTRAVENOUS; SUBCUTANEOUS at 15:36

## 2017-08-17 RX ADMIN — Medication 2 G: at 08:00

## 2017-08-17 RX ADMIN — SODIUM CHLORIDE: 9 INJECTION, SOLUTION INTRAVENOUS at 11:11

## 2017-08-17 RX ADMIN — ASPIRIN 325 MG ORAL TABLET 325 MG: 325 PILL ORAL at 21:29

## 2017-08-17 RX ADMIN — LEVOTHYROXINE SODIUM 125 MCG: 100 TABLET ORAL at 06:11

## 2017-08-17 ASSESSMENT — PAIN SCALES - GENERAL
PAINLEVEL_OUTOF10: 10
PAINLEVEL_OUTOF10: 7
PAINLEVEL_OUTOF10: 6
PAINLEVEL_OUTOF10: 5
PAINLEVEL_OUTOF10: 8
PAINLEVEL_OUTOF10: 7
PAINLEVEL_OUTOF10: 8
PAINLEVEL_OUTOF10: 6
PAINLEVEL_OUTOF10: 8

## 2017-08-18 PROCEDURE — 1210000000 HC MED SURG R&B

## 2017-08-18 PROCEDURE — 99024 POSTOP FOLLOW-UP VISIT: CPT | Performed by: SURGERY

## 2017-08-18 PROCEDURE — 6360000002 HC RX W HCPCS: Performed by: SURGERY

## 2017-08-18 PROCEDURE — 2580000003 HC RX 258: Performed by: SURGERY

## 2017-08-18 PROCEDURE — 93922 UPR/L XTREMITY ART 2 LEVELS: CPT

## 2017-08-18 PROCEDURE — 6370000000 HC RX 637 (ALT 250 FOR IP): Performed by: SURGERY

## 2017-08-18 RX ORDER — POLYETHYLENE GLYCOL 3350 17 G/17G
17 POWDER, FOR SOLUTION ORAL ONCE
Status: COMPLETED | OUTPATIENT
Start: 2017-08-18 | End: 2017-08-18

## 2017-08-18 RX ORDER — DOCUSATE SODIUM 100 MG/1
100 CAPSULE, LIQUID FILLED ORAL 2 TIMES DAILY
Status: DISCONTINUED | OUTPATIENT
Start: 2017-08-18 | End: 2017-08-20 | Stop reason: HOSPADM

## 2017-08-18 RX ADMIN — LEVOTHYROXINE SODIUM 125 MCG: 100 TABLET ORAL at 07:01

## 2017-08-18 RX ADMIN — VITAMIN D, TAB 1000IU (100/BT) 1000 UNITS: 25 TAB at 09:40

## 2017-08-18 RX ADMIN — Medication 100 MG: at 09:40

## 2017-08-18 RX ADMIN — ATENOLOL 25 MG: 25 TABLET ORAL at 09:40

## 2017-08-18 RX ADMIN — Medication 10 ML: at 21:49

## 2017-08-18 RX ADMIN — HYDROCODONE BITARTRATE AND ACETAMINOPHEN 1 TABLET: 5; 325 TABLET ORAL at 17:41

## 2017-08-18 RX ADMIN — ATORVASTATIN CALCIUM 40 MG: 40 TABLET, FILM COATED ORAL at 21:48

## 2017-08-18 RX ADMIN — HYDROMORPHONE HYDROCHLORIDE 0.5 MG: 1 INJECTION, SOLUTION INTRAMUSCULAR; INTRAVENOUS; SUBCUTANEOUS at 07:15

## 2017-08-18 RX ADMIN — DOCUSATE SODIUM 100 MG: 100 CAPSULE, LIQUID FILLED ORAL at 09:40

## 2017-08-18 RX ADMIN — DOCUSATE SODIUM 100 MG: 100 CAPSULE, LIQUID FILLED ORAL at 21:49

## 2017-08-18 RX ADMIN — Medication 10 ML: at 09:39

## 2017-08-18 RX ADMIN — ASPIRIN 325 MG ORAL TABLET 325 MG: 325 PILL ORAL at 21:48

## 2017-08-18 RX ADMIN — SODIUM CHLORIDE: 9 INJECTION, SOLUTION INTRAVENOUS at 21:48

## 2017-08-18 RX ADMIN — CLONIDINE HYDROCHLORIDE 0.1 MG: 0.1 TABLET ORAL at 21:48

## 2017-08-18 RX ADMIN — AMLODIPINE BESYLATE 5 MG: 5 TABLET ORAL at 09:39

## 2017-08-18 RX ADMIN — CLOPIDOGREL BISULFATE 75 MG: 75 TABLET, FILM COATED ORAL at 09:40

## 2017-08-18 RX ADMIN — HYDROCODONE BITARTRATE AND ACETAMINOPHEN 2 TABLET: 5; 325 TABLET ORAL at 05:03

## 2017-08-18 RX ADMIN — POLYETHYLENE GLYCOL 3350 17 G: 17 POWDER, FOR SOLUTION ORAL at 09:39

## 2017-08-18 RX ADMIN — HYDROMORPHONE HYDROCHLORIDE 0.5 MG: 1 INJECTION, SOLUTION INTRAMUSCULAR; INTRAVENOUS; SUBCUTANEOUS at 21:48

## 2017-08-18 RX ADMIN — SODIUM CHLORIDE: 9 INJECTION, SOLUTION INTRAVENOUS at 05:01

## 2017-08-18 RX ADMIN — Medication 1 TABLET: at 09:39

## 2017-08-18 RX ADMIN — LISINOPRIL 20 MG: 20 TABLET ORAL at 09:40

## 2017-08-18 ASSESSMENT — PAIN SCALES - GENERAL
PAINLEVEL_OUTOF10: 8
PAINLEVEL_OUTOF10: 7
PAINLEVEL_OUTOF10: 10
PAINLEVEL_OUTOF10: 7
PAINLEVEL_OUTOF10: 0

## 2017-08-19 PROCEDURE — 6370000000 HC RX 637 (ALT 250 FOR IP): Performed by: SURGERY

## 2017-08-19 PROCEDURE — 99024 POSTOP FOLLOW-UP VISIT: CPT | Performed by: SURGERY

## 2017-08-19 PROCEDURE — 1210000000 HC MED SURG R&B

## 2017-08-19 PROCEDURE — 6360000002 HC RX W HCPCS: Performed by: SURGERY

## 2017-08-19 PROCEDURE — 2580000003 HC RX 258: Performed by: SURGERY

## 2017-08-19 RX ORDER — POLYETHYLENE GLYCOL 3350 17 G/17G
17 POWDER, FOR SOLUTION ORAL DAILY
Status: DISCONTINUED | OUTPATIENT
Start: 2017-08-19 | End: 2017-08-20 | Stop reason: HOSPADM

## 2017-08-19 RX ADMIN — CLONIDINE HYDROCHLORIDE 0.1 MG: 0.1 TABLET ORAL at 21:01

## 2017-08-19 RX ADMIN — DOCUSATE SODIUM 100 MG: 100 CAPSULE, LIQUID FILLED ORAL at 08:30

## 2017-08-19 RX ADMIN — Medication 10 ML: at 21:01

## 2017-08-19 RX ADMIN — ASPIRIN 325 MG ORAL TABLET 325 MG: 325 PILL ORAL at 21:01

## 2017-08-19 RX ADMIN — HYDROMORPHONE HYDROCHLORIDE 0.5 MG: 1 INJECTION, SOLUTION INTRAMUSCULAR; INTRAVENOUS; SUBCUTANEOUS at 04:58

## 2017-08-19 RX ADMIN — ATENOLOL 25 MG: 25 TABLET ORAL at 08:30

## 2017-08-19 RX ADMIN — CLOPIDOGREL BISULFATE 75 MG: 75 TABLET, FILM COATED ORAL at 08:30

## 2017-08-19 RX ADMIN — Medication 100 MG: at 08:30

## 2017-08-19 RX ADMIN — HYDROCODONE BITARTRATE AND ACETAMINOPHEN 2 TABLET: 5; 325 TABLET ORAL at 21:01

## 2017-08-19 RX ADMIN — Medication 1 TABLET: at 14:56

## 2017-08-19 RX ADMIN — LISINOPRIL 20 MG: 20 TABLET ORAL at 08:30

## 2017-08-19 RX ADMIN — VITAMIN D, TAB 1000IU (100/BT) 1000 UNITS: 25 TAB at 08:30

## 2017-08-19 RX ADMIN — AMLODIPINE BESYLATE 5 MG: 5 TABLET ORAL at 08:30

## 2017-08-19 RX ADMIN — ATORVASTATIN CALCIUM 40 MG: 40 TABLET, FILM COATED ORAL at 21:01

## 2017-08-19 RX ADMIN — DOCUSATE SODIUM 100 MG: 100 CAPSULE, LIQUID FILLED ORAL at 21:01

## 2017-08-19 RX ADMIN — POLYETHYLENE GLYCOL 3350 17 G: 17 POWDER, FOR SOLUTION ORAL at 08:29

## 2017-08-19 RX ADMIN — HYDROCODONE BITARTRATE AND ACETAMINOPHEN 1 TABLET: 5; 325 TABLET ORAL at 12:24

## 2017-08-19 RX ADMIN — LEVOTHYROXINE SODIUM 125 MCG: 100 TABLET ORAL at 04:59

## 2017-08-19 ASSESSMENT — PAIN SCALES - GENERAL
PAINLEVEL_OUTOF10: 0
PAINLEVEL_OUTOF10: 0
PAINLEVEL_OUTOF10: 7
PAINLEVEL_OUTOF10: 8
PAINLEVEL_OUTOF10: 8

## 2017-08-20 VITALS
BODY MASS INDEX: 28.71 KG/M2 | RESPIRATION RATE: 16 BRPM | HEART RATE: 96 BPM | DIASTOLIC BLOOD PRESSURE: 79 MMHG | WEIGHT: 212 LBS | OXYGEN SATURATION: 98 % | HEIGHT: 72 IN | TEMPERATURE: 97.8 F | SYSTOLIC BLOOD PRESSURE: 144 MMHG

## 2017-08-20 PROCEDURE — 99024 POSTOP FOLLOW-UP VISIT: CPT | Performed by: SURGERY

## 2017-08-20 PROCEDURE — 6370000000 HC RX 637 (ALT 250 FOR IP): Performed by: SURGERY

## 2017-08-20 PROCEDURE — 2580000003 HC RX 258: Performed by: SURGERY

## 2017-08-20 RX ORDER — TRAMADOL HYDROCHLORIDE 50 MG/1
50 TABLET ORAL EVERY 6 HOURS PRN
Qty: 30 TABLET | Refills: 0 | Status: SHIPPED | OUTPATIENT
Start: 2017-08-20 | End: 2017-08-30

## 2017-08-20 RX ADMIN — HYDROCODONE BITARTRATE AND ACETAMINOPHEN 2 TABLET: 5; 325 TABLET ORAL at 05:51

## 2017-08-20 RX ADMIN — VITAMIN D, TAB 1000IU (100/BT) 1000 UNITS: 25 TAB at 08:25

## 2017-08-20 RX ADMIN — ATENOLOL 25 MG: 25 TABLET ORAL at 08:25

## 2017-08-20 RX ADMIN — Medication 1 TABLET: at 08:25

## 2017-08-20 RX ADMIN — LEVOTHYROXINE SODIUM 125 MCG: 100 TABLET ORAL at 05:50

## 2017-08-20 RX ADMIN — POLYETHYLENE GLYCOL 3350 17 G: 17 POWDER, FOR SOLUTION ORAL at 08:25

## 2017-08-20 RX ADMIN — LISINOPRIL 20 MG: 20 TABLET ORAL at 08:25

## 2017-08-20 RX ADMIN — MAGESIUM CITRATE 296 ML: 1.75 LIQUID ORAL at 09:45

## 2017-08-20 RX ADMIN — AMLODIPINE BESYLATE 5 MG: 5 TABLET ORAL at 08:25

## 2017-08-20 RX ADMIN — Medication 10 ML: at 08:28

## 2017-08-20 RX ADMIN — DOCUSATE SODIUM 100 MG: 100 CAPSULE, LIQUID FILLED ORAL at 08:25

## 2017-08-20 RX ADMIN — CLOPIDOGREL BISULFATE 75 MG: 75 TABLET, FILM COATED ORAL at 08:25

## 2017-08-20 RX ADMIN — Medication 100 MG: at 08:25

## 2017-08-20 ASSESSMENT — PAIN SCALES - GENERAL: PAINLEVEL_OUTOF10: 7

## 2017-08-28 ENCOUNTER — TELEPHONE (OUTPATIENT)
Dept: VASCULAR SURGERY | Age: 72
End: 2017-08-28

## 2017-08-30 ENCOUNTER — OFFICE VISIT (OUTPATIENT)
Dept: VASCULAR SURGERY | Age: 72
End: 2017-08-30

## 2017-08-30 ENCOUNTER — HOSPITAL ENCOUNTER (OUTPATIENT)
Dept: VASCULAR LAB | Age: 72
Discharge: HOME | End: 2017-08-30
Payer: MEDICARE

## 2017-08-30 VITALS
HEART RATE: 68 BPM | OXYGEN SATURATION: 96 % | RESPIRATION RATE: 18 BRPM | SYSTOLIC BLOOD PRESSURE: 150 MMHG | DIASTOLIC BLOOD PRESSURE: 86 MMHG | TEMPERATURE: 98 F

## 2017-08-30 DIAGNOSIS — I65.23 BILATERAL CAROTID ARTERY STENOSIS: Primary | ICD-10-CM

## 2017-08-30 DIAGNOSIS — I65.23 BILATERAL CAROTID ARTERY STENOSIS: ICD-10-CM

## 2017-08-30 DIAGNOSIS — I70.213 ATHEROSCLEROSIS OF NATIVE ARTERY OF BOTH LOWER EXTREMITIES WITH INTERMITTENT CLAUDICATION (HCC): ICD-10-CM

## 2017-08-30 PROCEDURE — 99024 POSTOP FOLLOW-UP VISIT: CPT | Performed by: NURSE PRACTITIONER

## 2017-08-30 PROCEDURE — 93880 EXTRACRANIAL BILAT STUDY: CPT

## 2017-09-26 ENCOUNTER — OFFICE VISIT (OUTPATIENT)
Dept: VASCULAR SURGERY | Age: 72
End: 2017-09-26

## 2017-09-26 VITALS
DIASTOLIC BLOOD PRESSURE: 80 MMHG | SYSTOLIC BLOOD PRESSURE: 140 MMHG | TEMPERATURE: 96.8 F | HEART RATE: 66 BPM | RESPIRATION RATE: 18 BRPM

## 2017-09-26 DIAGNOSIS — I70.213 ATHEROSCLEROSIS OF NATIVE ARTERY OF BOTH LOWER EXTREMITIES WITH INTERMITTENT CLAUDICATION (HCC): Primary | ICD-10-CM

## 2017-09-26 PROCEDURE — 99024 POSTOP FOLLOW-UP VISIT: CPT | Performed by: NURSE PRACTITIONER

## 2017-11-01 DIAGNOSIS — C61 MALIGNANT NEOPLASM OF PROSTATE (HCC): ICD-10-CM

## 2017-11-01 LAB — PROSTATE SPECIFIC ANTIGEN: 0.02 NG/ML (ref 0–4)

## 2017-11-06 ENCOUNTER — OFFICE VISIT (OUTPATIENT)
Dept: UROLOGY | Age: 72
End: 2017-11-06
Payer: MEDICARE

## 2017-11-06 VITALS
HEIGHT: 72 IN | WEIGHT: 217 LBS | HEART RATE: 65 BPM | TEMPERATURE: 98.1 F | BODY MASS INDEX: 29.39 KG/M2 | SYSTOLIC BLOOD PRESSURE: 151 MMHG | DIASTOLIC BLOOD PRESSURE: 79 MMHG

## 2017-11-06 DIAGNOSIS — C61 MALIGNANT NEOPLASM PROSTATE (HCC): Primary | ICD-10-CM

## 2017-11-06 LAB
BACTERIA URINE, POC: NORMAL
BILIRUBIN URINE: 0 MG/DL
BLOOD, URINE: POSITIVE
CASTS URINE, POC: NORMAL
CLARITY: NORMAL
COLOR: NORMAL
CRYSTALS URINE, POC: NORMAL
EPI CELLS URINE, POC: NORMAL
GLUCOSE URINE: NORMAL
KETONES, URINE: NEGATIVE
LEUKOCYTE EST, POC: NORMAL
NITRITE, URINE: NEGATIVE
PH UA: 5.5 (ref 4.5–8)
PROTEIN UA: NEGATIVE
RBC URINE, POC: NORMAL
SPECIFIC GRAVITY UA: 1.02 (ref 1–1.03)
UROBILINOGEN, URINE: NORMAL
WBC URINE, POC: NORMAL
YEAST URINE, POC: NORMAL

## 2017-11-06 PROCEDURE — 3017F COLORECTAL CA SCREEN DOC REV: CPT | Performed by: UROLOGY

## 2017-11-06 PROCEDURE — 99213 OFFICE O/P EST LOW 20 MIN: CPT | Performed by: UROLOGY

## 2017-11-06 PROCEDURE — 81000 URINALYSIS NONAUTO W/SCOPE: CPT | Performed by: UROLOGY

## 2017-11-06 PROCEDURE — 4040F PNEUMOC VAC/ADMIN/RCVD: CPT | Performed by: UROLOGY

## 2017-11-06 PROCEDURE — G8598 ASA/ANTIPLAT THER USED: HCPCS | Performed by: UROLOGY

## 2017-11-06 PROCEDURE — G8484 FLU IMMUNIZE NO ADMIN: HCPCS | Performed by: UROLOGY

## 2017-11-06 PROCEDURE — G8428 CUR MEDS NOT DOCUMENT: HCPCS | Performed by: UROLOGY

## 2017-11-06 PROCEDURE — 1123F ACP DISCUSS/DSCN MKR DOCD: CPT | Performed by: UROLOGY

## 2017-11-06 PROCEDURE — G8417 CALC BMI ABV UP PARAM F/U: HCPCS | Performed by: UROLOGY

## 2017-11-06 PROCEDURE — 4004F PT TOBACCO SCREEN RCVD TLK: CPT | Performed by: UROLOGY

## 2017-11-06 RX ORDER — FERROUS SULFATE 325(65) MG
TABLET ORAL
Refills: 2 | COMMUNITY
Start: 2017-10-30 | End: 2018-06-14

## 2017-11-06 ASSESSMENT — ENCOUNTER SYMPTOMS
BLURRED VISION: 0
NAUSEA: 0
SORE THROAT: 0
HEARTBURN: 0
WHEEZING: 0
SHORTNESS OF BREATH: 0
DOUBLE VISION: 0

## 2017-11-06 NOTE — LETTER
[facility]  [address] [place] [address], [address] [facility] [place] [address]  Phone: [phone]  Fax: [phone]    Yu Otto MD        November 6, 2017     [facility] [address] [place] [address]      Patient: Damari Pate   MR Number: [id number]   YOB: 1945   Date of Visit: 11/6/2017       Dear Provider: Thank you for referring Jeannie Chris to me for evaluation. Below are the relevant portions of my assessment and plan of care. If you have questions, please do not hesitate to call me. I look forward to following Ester Callaway along with you.     Sincerely,        Yu Otto MD

## 2017-11-07 ENCOUNTER — TELEPHONE (OUTPATIENT)
Dept: VASCULAR SURGERY | Age: 72
End: 2017-11-07

## 2017-12-05 ENCOUNTER — OFFICE VISIT (OUTPATIENT)
Dept: VASCULAR SURGERY | Age: 72
End: 2017-12-05
Payer: MEDICARE

## 2017-12-05 ENCOUNTER — HOSPITAL ENCOUNTER (OUTPATIENT)
Dept: VASCULAR LAB | Age: 72
Discharge: HOME | End: 2017-12-05
Payer: MEDICARE

## 2017-12-05 VITALS — HEART RATE: 63 BPM | HEIGHT: 72 IN | DIASTOLIC BLOOD PRESSURE: 87 MMHG | SYSTOLIC BLOOD PRESSURE: 156 MMHG

## 2017-12-05 DIAGNOSIS — I70.213 ATHEROSCLEROSIS OF NATIVE ARTERY OF BOTH LOWER EXTREMITIES WITH INTERMITTENT CLAUDICATION (HCC): Primary | ICD-10-CM

## 2017-12-05 DIAGNOSIS — I70.213 ATHEROSCLEROSIS OF NATIVE ARTERY OF BOTH LOWER EXTREMITIES WITH INTERMITTENT CLAUDICATION (HCC): ICD-10-CM

## 2017-12-05 PROCEDURE — G8484 FLU IMMUNIZE NO ADMIN: HCPCS | Performed by: NURSE PRACTITIONER

## 2017-12-05 PROCEDURE — G8427 DOCREV CUR MEDS BY ELIG CLIN: HCPCS | Performed by: NURSE PRACTITIONER

## 2017-12-05 PROCEDURE — 1123F ACP DISCUSS/DSCN MKR DOCD: CPT | Performed by: NURSE PRACTITIONER

## 2017-12-05 PROCEDURE — 3017F COLORECTAL CA SCREEN DOC REV: CPT | Performed by: NURSE PRACTITIONER

## 2017-12-05 PROCEDURE — 93923 UPR/LXTR ART STDY 3+ LVLS: CPT

## 2017-12-05 PROCEDURE — 4040F PNEUMOC VAC/ADMIN/RCVD: CPT | Performed by: NURSE PRACTITIONER

## 2017-12-05 PROCEDURE — G8417 CALC BMI ABV UP PARAM F/U: HCPCS | Performed by: NURSE PRACTITIONER

## 2017-12-05 PROCEDURE — G8598 ASA/ANTIPLAT THER USED: HCPCS | Performed by: NURSE PRACTITIONER

## 2017-12-05 PROCEDURE — 99212 OFFICE O/P EST SF 10 MIN: CPT | Performed by: NURSE PRACTITIONER

## 2017-12-05 PROCEDURE — 4004F PT TOBACCO SCREEN RCVD TLK: CPT | Performed by: NURSE PRACTITIONER

## 2017-12-05 NOTE — PROGRESS NOTES
by mouth      clopidogrel (PLAVIX) 75 MG tablet Take 1 tablet by mouth daily 30 tablet 0    Multiple Vitamins-Minerals (MULTI FOR HIM 50+ PO) Take 1 tablet by mouth daily       levothyroxine (SYNTHROID) 125 MCG tablet Take 125 mcg by mouth Daily Indications: Underactive Thyroid      amLODIPine (NORVASC) 5 MG tablet Take 5 mg by mouth daily Indications: High Blood Pressure       aspirin 325 MG tablet Take 325 mg by mouth daily       atenolol (TENORMIN) 50 MG tablet Take 25 mg by mouth daily Indications: High Blood Pressure       atorvastatin (LIPITOR) 20 MG tablet Take 40 mg by mouth nightly Indications: Changes in Cholesterol       Cholecalciferol (VITAMIN D3) 1000 UNITS TABS Take 1,000 Units by mouth daily       coenzyme Q10 100 MG CAPS capsule Take 100 mg by mouth daily       lisinopril (PRINIVIL;ZESTRIL) 20 MG tablet Take 20 mg by mouth daily       nitroGLYCERIN (NITROSTAT) 0.4 MG SL tablet Place 0.4 mg under the tongue every 5 minutes as needed        No current facility-administered medications for this visit. Allergies: Pollen extract  Past Medical History:   Diagnosis Date    AAA (abdominal aortic aneurysm) without rupture (HCC)     Arthritis     Atheroscler of native artery of both legs with intermit claudication [facility]     BPH without obstruction/lower urinary tract symptoms     CAD in native artery     sees dr. Charly Sawyer Carotid artery stenosis     History of blood transfusion     Hyperlipidemia     Hypertension     Hyperthyroidism     Iron deficiency     Malignant neoplasm prostate ([facility] [address]     surgery    Right BBB/left ant fasc block     per dr. Rocky Sam; awaiting ekg from [facility]/office.  Sleep apnea     cpap used    TIA (transient ischemic attack)      Past Surgical History:   Procedure Laterality Date    CAROTID ENDARTERECTOMY Left 4/12/2017    [facility]. Left carotid endarterectomy with bovine pericardial catch. Left cervical carotid arteriograms.     CATARACT REMOVAL Bilateral     CORONARY ANGIOPLASTY WITH STENT PLACEMENT  2010    dr. Hany Smith Left 8/16/2017    WOUND EXPLORATION FOR POST-OP BLEEDING performed by Angela Rivera MD at [place] [address] Left 8/16/2017    PERCUTANEOUS TRANSLUMINAL BALLOON ANGIOPLASTY OF LEFT COMMON AND EXTERNAL ILIAC ARTERIES AND RIGHT, EXTERNAL ILIAC ARTERY; LEFT COMMON FEMORAL ENDARTERECTOMY WITH BOVINE PATCH ANGIOPLASTY, SUPERFICIAL FEMORAL ENDARECTOMY WITH BOVINE PATCHING performed by Angela Rivera MD at [address] [facility] VASCULAR SURGERY  05/22/2017    Right CFA 5f-6f 90 cm sheath, Right EIA stent (Express 8x27), Arch aortogram, Right carotid/intracerebral arteriograms, Right distal ICA emboshield filter, Right ICA stent (xact 8-10x40), Right ICA stent balloon angioplasty 6x40 viatrac, Retrieval of emoshield filter, Right carotid/intracerebral arteriograms, Distal aortogram w/ bilateral iliofemoral arterigrams, Mynx right CFA-- [facility] VASCULAR SURGERY  07/25/2017    [facility]. Right CFA 5f sheath,aortogram with bilateral lower arteriogram,mynx right CFA.  VASCULAR SURGERY  08/09/2017    [facility] Percutaneous cannulation right common femoral artery with 5 Jordanian glide sheath. Suprarenal abdominal aortogram with bilateral lower extremity arteriogram.Mynx closure right common femoral artery puncture site.     WRIST FRACTURE SURGERY Right      Family History   Problem Relation Age of Onset    Kidney Disease Father     Dementia Father      alzheimers    Ovarian Cancer Mother     Breast Cancer Sister      Social History   Substance Use Topics    Smoking status: Light Tobacco Smoker     Packs/day: 0.25     Years: 50.00     Last attempt to quit: 4/10/2017    Smokeless tobacco: Never Used      Comment: Pt is trying to quit    Alcohol use 1.2 oz/week     2 Cans of beer per week      Comment: 2 beers in the evening         Review of Systems    Constitutional - no significant

## 2018-01-17 ENCOUNTER — OFFICE VISIT (OUTPATIENT)
Dept: CARDIOLOGY | Facility: CLINIC | Age: 73
End: 2018-01-17

## 2018-01-17 VITALS
HEART RATE: 60 BPM | OXYGEN SATURATION: 99 % | SYSTOLIC BLOOD PRESSURE: 160 MMHG | HEIGHT: 72 IN | BODY MASS INDEX: 29.26 KG/M2 | WEIGHT: 216 LBS | DIASTOLIC BLOOD PRESSURE: 78 MMHG

## 2018-01-17 DIAGNOSIS — I25.10 CORONARY ARTERY DISEASE INVOLVING NATIVE CORONARY ARTERY OF NATIVE HEART WITHOUT ANGINA PECTORIS: ICD-10-CM

## 2018-01-17 DIAGNOSIS — I10 ESSENTIAL HYPERTENSION: ICD-10-CM

## 2018-01-17 DIAGNOSIS — Z95.5 STENTED CORONARY ARTERY: ICD-10-CM

## 2018-01-17 DIAGNOSIS — E78.2 MIXED HYPERLIPIDEMIA: Primary | ICD-10-CM

## 2018-01-17 DIAGNOSIS — I48.0 PAROXYSMAL ATRIAL FIBRILLATION (HCC): ICD-10-CM

## 2018-01-17 PROCEDURE — 93000 ELECTROCARDIOGRAM COMPLETE: CPT | Performed by: INTERNAL MEDICINE

## 2018-01-17 PROCEDURE — 99214 OFFICE O/P EST MOD 30 MIN: CPT | Performed by: INTERNAL MEDICINE

## 2018-01-17 RX ORDER — AMLODIPINE BESYLATE 10 MG/1
10 TABLET ORAL DAILY
Qty: 90 TABLET | Refills: 3 | Status: SHIPPED | OUTPATIENT
Start: 2018-01-17 | End: 2019-01-26 | Stop reason: SDUPTHER

## 2018-01-17 RX ORDER — FERROUS SULFATE 325(65) MG
TABLET ORAL
COMMUNITY
Start: 2017-10-30

## 2018-01-17 NOTE — PROGRESS NOTES
Robin Herrera  [id number]  1945  72 y.o.  male    Referring Provider: Wilber Rivera Jr., MD    Reason for Follow-up Visit:  Routine follow up.   Paroxysmal atrial fibrillation   Recent left carotid endarterectomy  Post surgery brief atrial flutter that resolved on day 3 POD per available ECG from [facility]  Holter [place] 4/22 brief SVT to PAF/flutter    Subjective    Overall feeling well   No chest pain or shortness of breath   No palpitations  No significant pedal edema  Compliant with medications and dietary advice  Good effort tolerance  No presyncope or syncope  Compliant with medications        History of present illness:  Robin Herrera is a 72 y.o. yo male with history of carotid stenosis who presents today for   Chief Complaint   Patient presents with   • Coronary Artery Disease     9 mo f/u   • Atrial Fibrillation   .    History  Past Medical History:   Diagnosis Date   • AAA (abdominal aortic aneurysm)    • Atrial fibrillation    • CAD in native artery    • Headache    • Hypertension    ,   Past Surgical History:   Procedure Laterality Date   • CARDIAC CATHETERIZATION     • CAROTID STENT     • CAROTID STENT      Right    • CORONARY STENT PLACEMENT      x 1 - 2010   • EYE SURGERY Bilateral     cataracts    • PROSTATECTOMY     • TONSILLECTOMY     • VEIN SURGERY      vascular / Dr. Ty   ,   Family History   Problem Relation Age of Onset   • Alzheimer's disease Father    • Kidney failure Father    • Stroke Father    • Coronary artery disease Neg Hx    ,   Social History   Substance Use Topics   • Smoking status: Current Every Day Smoker     Packs/day: 0.50     Types: Cigarettes   • Smokeless tobacco: Never Used      Comment: 03/2017   • Alcohol use Yes   ,     Medications  Current Outpatient Prescriptions   Medication Sig Dispense Refill   • amLODIPine (NORVASC) 10 MG tablet Take 1 tablet by mouth Daily. 90 tablet 3   • aspirin 325 MG tablet Take 325 mg by mouth daily.     • atenolol  "(TENORMIN) 25 MG tablet Take 1 tablet by mouth Daily. 30 tablet 11   • atorvastatin (LIPITOR) 40 MG tablet Take 1 tablet by mouth Daily. 30 tablet 11   • cholecalciferol (VITAMIN D3) 1000 UNITS tablet Take 1,000 Units by mouth daily.     • clopidogrel (PLAVIX) 75 MG tablet Take 1 tablet by mouth Daily. 30 tablet 11   • coenzyme Q10 100 MG capsule Take 100 mg by mouth daily.     • ferrous sulfate 325 (65 FE) MG tablet TAKE ONE TABLET BY MOUTH TWICE DAILY     • levothyroxine (SYNTHROID, LEVOTHROID) 137 MCG tablet Take 137 mcg by mouth Daily.     • lisinopril (PRINIVIL,ZESTRIL) 20 MG tablet Take 1 tablet by mouth Daily. 30 tablet 11   • Multiple Vitamins-Minerals (MULTIVITAMIN ADULT PO) Take  by mouth.     • nicotine polacrilex (NICORETTE) 4 MG gum Chew 4 mg As Needed for smoking cessation.     • nitroglycerin (NITROSTAT) 0.4 MG SL tablet Place 0.4 mg under the tongue every 5 (five) minutes as needed for chest pain. Take no more than 3 doses in 15 minutes.     • PrednisoLONE 5 MG tablet Take  by mouth.       No current facility-administered medications for this visit.        Allergies:  Pollen extract    Review of Systems  Review of Systems   Constitution: Positive for malaise/fatigue.   HENT: Negative.    Eyes: Negative.    Cardiovascular: Negative for chest pain, claudication, cyanosis, dyspnea on exertion, irregular heartbeat, leg swelling, near-syncope, orthopnea, palpitations, paroxysmal nocturnal dyspnea and syncope.   Respiratory: Negative.    Endocrine: Negative.    Hematologic/Lymphatic: Negative.    Skin: Negative.    Musculoskeletal: Positive for arthritis.   Gastrointestinal: Negative for anorexia.   Genitourinary: Negative.    Neurological: Negative.    Psychiatric/Behavioral: Negative.        Objective     Physical Exam:  /78  Pulse 60  Ht 182.9 cm (72\")  Wt 98 kg (216 lb)  SpO2 99%  BMI 29.29 kg/m2  Physical Exam   Constitutional: He appears well-developed.   HENT:   Head: Normocephalic. "   Neck: Normal carotid pulses and no JVD present. No tracheal tenderness present. Carotid bruit is not present. No tracheal deviation and no edema present.   Cardiovascular: Regular rhythm, normal heart sounds and normal pulses.    Pulmonary/Chest: Effort normal. No stridor.   Abdominal: Soft.   Neurological: He is alert. He has normal strength. No cranial nerve deficit or sensory deficit.   Skin: Skin is warm.   Psychiatric: He has a normal mood and affect. His speech is normal and behavior is normal.       Results Review:       ECG 12 Lead  Date/Time: 1/17/2018 10:00 AM  Performed by: STEPHANIE DOWLING  Authorized by: STEPHANIE DOWLING   Comparison: compared with previous ECG from 1/13/2017  Similar to previous ECG  Rhythm: sinus rhythm  Rate: normal  Conduction: conduction normal  Conduction: non-specific intraventricular conduction delay  ST Segments: ST segments normal  T Waves: T waves normal  QRS axis: normal  Other: no other findings  Clinical impression: abnormal ECG            Assessment/Plan   Patient Active Problem List   Diagnosis   • Hyperlipemia   • Essential hypertension   • Coronary artery disease involving native coronary artery of native heart without angina pectoris   • Stented coronary artery   • Atrial fibrillation     Results for orders placed during the hospital encounter of 09/16/16   Adult Stress Echo With Color & Doppler    Narrative [facility] - CARDIOLOGY STRESS ECHO    PATIENT:       DAYDAY CONN  MRN:           [id number]  VISIT ID:      [id number]  CATH DR:       Stephanie Dowling*  DATE:          09/16/2016  ROOM:            ______________________________________________________________________________    cc:  CHELLE YOUNG    YOB: 1945    PROCEDURE PERFORMED: Lexiscan Cardiolite stress test.    INDICATION: Chest pain.     DESCRIPTION OF PROCEDURE: Patient underwent Lexiscan Cardiolite stress test.   Rest dose of Cardiolite 11 mCi, stress dose 33 mCi. Injection of  "Lexiscan   caused shortness of breath, no EKG abnormalities that were new. Raw data   shows soft tissue attenuation artifact. Perfusion scan shows normal uptake   without any convincing areas of ischemia or scar. Gated scan shows ejection   fraction of 70%.      IMPRESSION:  1. Ejection fraction of 70%.   2. No areas of ischemia or scar.                                       __________________________                                DIMAS Kitchen/[id number]  D:  09/16/2016 14:50:38 (Eastern Time)  T:  09/16/2016 15:20:16 (Eastern Time)  Voice ID:  [id number]/Document ID:  [id number]  0  DO NOT TEXT EDIT THIS LINE :[id number]:  Authenticated by STEPHANIE DOWLING MD On 09/16/2016 02:34:03 PM      No palpitations. No significant pedal edema. Compliant with medications and diet. Latest labs and medications reviewed.  Zio patch shows minor ectopy and SVT    Plan:    Same treatment, Monitor for any signs of bleeding including red or dark stools. Fall precautions.   Patient is asked to monitor BP at home or work, several times per month and return with written values at next office visit.    Low salt/ HTN/ Heart healthy carbohydrate restricted cardiac diet as applicable to this patient's current diagnoses.   This handout has relevant information regarding shopping for food, preparing meals, what to eat at restaurants, tracking of food intake, information regarding sodium intake and salt content, how to read food labels, knowing what to eat, tips reagarding physical activity, calorie count and calorie expenditure. What foods to avoid. Information regarding alcoholic drinks along with \"good\" and \"bad\" fats.  Relevant printed educational materials given pertinent to above diagnoses     No additional cardiac testing required at this point in time.  Keep LDL below 70 mg/dl. Monitor liver and renal functions.    Continue ASA and Plavix as multiple vascular procedures, likely had Paroxysmal atrial fibrillation after surgery  Close " follow up with you as scheduled.  Intensive factor modifications.  See order list.    Monitor for any signs of bleeding including red or dark stools. Fall precautions.   Patient is asked to monitor BP at home or work, several times per month and return with written values at next office visit.   Counseled regarding disease appropriate diet, fluid, caffeine, stimulants and sodium intake as well as importance of compliance to diet, exercise and regular follow up.  Avoid NSAIDS and COX2 inhibitors. Use Acetaminophen PRN.  Requested Prescriptions     Signed Prescriptions Disp Refills   • amLODIPine (NORVASC) 10 MG tablet 90 tablet 3     Sig: Take 1 tablet by mouth Daily.    Gave a copy of my notes and relevant tests/ prior ECG etc for the patient to review and follow specific advise and relevant findings if any, prognosis, along with my current and future plans.     Return in about 6 months (around 7/17/2018).

## 2018-01-25 ENCOUNTER — TRANSCRIBE ORDERS (OUTPATIENT)
Dept: ADMINISTRATIVE | Facility: HOSPITAL | Age: 73
End: 2018-01-25

## 2018-01-25 DIAGNOSIS — C61 PROSTATE CA (HCC): Primary | ICD-10-CM

## 2018-01-30 ENCOUNTER — HOSPITAL ENCOUNTER (OUTPATIENT)
Dept: CT IMAGING | Facility: HOSPITAL | Age: 73
Discharge: HOME | End: 2018-01-30
Admitting: INTERNAL MEDICINE

## 2018-01-30 ENCOUNTER — HOSPITAL ENCOUNTER (OUTPATIENT)
Dept: CT IMAGING | Facility: HOSPITAL | Age: 73
End: 2018-01-30

## 2018-01-30 DIAGNOSIS — C61 PROSTATE CA (HCC): ICD-10-CM

## 2018-01-30 PROCEDURE — 78815 PET IMAGE W/CT SKULL-THIGH: CPT

## 2018-01-30 PROCEDURE — A9552 F18 FDG: HCPCS | Performed by: INTERNAL MEDICINE

## 2018-01-30 PROCEDURE — 0 FLUDEOXYGLUCOSE F18 SOLUTION: Performed by: INTERNAL MEDICINE

## 2018-01-30 RX ADMIN — FLUDEOXYGLUCOSE F18 1 DOSE: 300 INJECTION INTRAVENOUS at 13:45

## 2018-03-26 ENCOUNTER — HOSPITAL ENCOUNTER (OUTPATIENT)
Dept: VASCULAR LAB | Age: 73
Discharge: HOME | End: 2018-03-26
Payer: MEDICARE

## 2018-03-26 DIAGNOSIS — I70.213 ATHEROSCLEROSIS OF NATIVE ARTERY OF BOTH LOWER EXTREMITIES WITH INTERMITTENT CLAUDICATION (HCC): ICD-10-CM

## 2018-03-26 PROCEDURE — 93923 UPR/LXTR ART STDY 3+ LVLS: CPT

## 2018-03-27 ENCOUNTER — TELEPHONE (OUTPATIENT)
Dept: VASCULAR SURGERY | Age: 73
End: 2018-03-27

## 2018-03-27 DIAGNOSIS — I70.213 ATHEROSCLEROSIS OF NATIVE ARTERY OF BOTH LOWER EXTREMITIES WITH INTERMITTENT CLAUDICATION (HCC): Primary | ICD-10-CM

## 2018-06-14 ENCOUNTER — OFFICE VISIT (OUTPATIENT)
Dept: NEUROLOGY | Age: 73
End: 2018-06-14
Payer: MEDICARE

## 2018-06-14 VITALS
WEIGHT: 213 LBS | DIASTOLIC BLOOD PRESSURE: 78 MMHG | BODY MASS INDEX: 28.85 KG/M2 | SYSTOLIC BLOOD PRESSURE: 138 MMHG | HEIGHT: 72 IN | HEART RATE: 58 BPM

## 2018-06-14 DIAGNOSIS — E05.90 HYPERTHYROIDISM: ICD-10-CM

## 2018-06-14 DIAGNOSIS — I25.10 CORONARY ARTERY DISEASE INVOLVING NATIVE CORONARY ARTERY OF NATIVE HEART WITHOUT ANGINA PECTORIS: ICD-10-CM

## 2018-06-14 DIAGNOSIS — R41.3 MEMORY LOSS: Primary | ICD-10-CM

## 2018-06-14 DIAGNOSIS — E78.5 HYPERLIPIDEMIA, UNSPECIFIED HYPERLIPIDEMIA TYPE: ICD-10-CM

## 2018-06-14 DIAGNOSIS — R94.02 ABNORMAL BRAIN SCAN: ICD-10-CM

## 2018-06-14 DIAGNOSIS — I77.9 CAROTID DISEASE, BILATERAL (HCC): ICD-10-CM

## 2018-06-14 PROCEDURE — 99213 OFFICE O/P EST LOW 20 MIN: CPT | Performed by: PSYCHIATRY & NEUROLOGY

## 2018-06-14 PROCEDURE — 4004F PT TOBACCO SCREEN RCVD TLK: CPT | Performed by: PSYCHIATRY & NEUROLOGY

## 2018-06-14 PROCEDURE — 3017F COLORECTAL CA SCREEN DOC REV: CPT | Performed by: PSYCHIATRY & NEUROLOGY

## 2018-06-14 PROCEDURE — G8417 CALC BMI ABV UP PARAM F/U: HCPCS | Performed by: PSYCHIATRY & NEUROLOGY

## 2018-06-14 PROCEDURE — 1123F ACP DISCUSS/DSCN MKR DOCD: CPT | Performed by: PSYCHIATRY & NEUROLOGY

## 2018-06-14 PROCEDURE — G8427 DOCREV CUR MEDS BY ELIG CLIN: HCPCS | Performed by: PSYCHIATRY & NEUROLOGY

## 2018-06-14 PROCEDURE — 4040F PNEUMOC VAC/ADMIN/RCVD: CPT | Performed by: PSYCHIATRY & NEUROLOGY

## 2018-06-14 PROCEDURE — G8598 ASA/ANTIPLAT THER USED: HCPCS | Performed by: PSYCHIATRY & NEUROLOGY

## 2018-06-18 RX ORDER — CLOPIDOGREL BISULFATE 75 MG/1
75 TABLET ORAL DAILY
Qty: 30 TABLET | Refills: 7 | Status: SHIPPED | OUTPATIENT
Start: 2018-06-18 | End: 2019-02-22 | Stop reason: SDUPTHER

## 2018-06-28 ENCOUNTER — OFFICE VISIT (OUTPATIENT)
Dept: VASCULAR SURGERY | Age: 73
End: 2018-06-28
Payer: MEDICARE

## 2018-06-28 ENCOUNTER — HOSPITAL ENCOUNTER (OUTPATIENT)
Dept: VASCULAR LAB | Age: 73
Discharge: HOME | End: 2018-06-28
Payer: MEDICARE

## 2018-06-28 VITALS
TEMPERATURE: 97.3 F | SYSTOLIC BLOOD PRESSURE: 129 MMHG | DIASTOLIC BLOOD PRESSURE: 75 MMHG | RESPIRATION RATE: 18 BRPM | HEART RATE: 64 BPM

## 2018-06-28 DIAGNOSIS — I70.213 ATHEROSCLEROSIS OF NATIVE ARTERY OF BOTH LOWER EXTREMITIES WITH INTERMITTENT CLAUDICATION (HCC): Primary | ICD-10-CM

## 2018-06-28 DIAGNOSIS — I65.23 BILATERAL CAROTID ARTERY STENOSIS: ICD-10-CM

## 2018-06-28 DIAGNOSIS — I71.40 AAA (ABDOMINAL AORTIC ANEURYSM) WITHOUT RUPTURE: ICD-10-CM

## 2018-06-28 DIAGNOSIS — I70.213 ATHEROSCLEROSIS OF NATIVE ARTERY OF BOTH LOWER EXTREMITIES WITH INTERMITTENT CLAUDICATION (HCC): ICD-10-CM

## 2018-06-28 PROCEDURE — 4040F PNEUMOC VAC/ADMIN/RCVD: CPT | Performed by: NURSE PRACTITIONER

## 2018-06-28 PROCEDURE — 3017F COLORECTAL CA SCREEN DOC REV: CPT | Performed by: NURSE PRACTITIONER

## 2018-06-28 PROCEDURE — 4004F PT TOBACCO SCREEN RCVD TLK: CPT | Performed by: NURSE PRACTITIONER

## 2018-06-28 PROCEDURE — 93923 UPR/LXTR ART STDY 3+ LVLS: CPT

## 2018-06-28 PROCEDURE — 99212 OFFICE O/P EST SF 10 MIN: CPT | Performed by: NURSE PRACTITIONER

## 2018-06-28 PROCEDURE — G8427 DOCREV CUR MEDS BY ELIG CLIN: HCPCS | Performed by: NURSE PRACTITIONER

## 2018-06-28 PROCEDURE — G8598 ASA/ANTIPLAT THER USED: HCPCS | Performed by: NURSE PRACTITIONER

## 2018-06-28 PROCEDURE — G8417 CALC BMI ABV UP PARAM F/U: HCPCS | Performed by: NURSE PRACTITIONER

## 2018-06-28 PROCEDURE — 1123F ACP DISCUSS/DSCN MKR DOCD: CPT | Performed by: NURSE PRACTITIONER

## 2018-08-14 ENCOUNTER — OFFICE VISIT (OUTPATIENT)
Dept: CARDIOLOGY | Facility: CLINIC | Age: 73
End: 2018-08-14

## 2018-08-14 VITALS
HEIGHT: 72 IN | OXYGEN SATURATION: 98 % | SYSTOLIC BLOOD PRESSURE: 138 MMHG | BODY MASS INDEX: 28.85 KG/M2 | WEIGHT: 213 LBS | DIASTOLIC BLOOD PRESSURE: 70 MMHG | HEART RATE: 65 BPM

## 2018-08-14 DIAGNOSIS — Z95.5 STENTED CORONARY ARTERY: ICD-10-CM

## 2018-08-14 DIAGNOSIS — E78.2 MIXED HYPERLIPIDEMIA: Primary | ICD-10-CM

## 2018-08-14 DIAGNOSIS — I48.0 PAROXYSMAL ATRIAL FIBRILLATION (HCC): ICD-10-CM

## 2018-08-14 DIAGNOSIS — I25.10 CORONARY ARTERY DISEASE INVOLVING NATIVE CORONARY ARTERY OF NATIVE HEART WITHOUT ANGINA PECTORIS: ICD-10-CM

## 2018-08-14 DIAGNOSIS — I10 ESSENTIAL HYPERTENSION: ICD-10-CM

## 2018-08-14 PROCEDURE — 93000 ELECTROCARDIOGRAM COMPLETE: CPT | Performed by: INTERNAL MEDICINE

## 2018-08-14 PROCEDURE — 99214 OFFICE O/P EST MOD 30 MIN: CPT | Performed by: INTERNAL MEDICINE

## 2018-08-14 NOTE — PROGRESS NOTES
Robin Herrera  [id number]  1945  72 y.o.  male    Referring Provider: Wilber Rivera Jr., MD    Reason for Follow-up Visit:  Routine follow up.   Paroxysmal atrial fibrillation   Recent left carotid endarterectomy  Post surgery brief atrial flutter that resolved on day 3 POD per available ECG from [facility]  Holter [place] 4/22 brief SVT no PAF/flutter    Zio patch     · The predominant rhythm noted during the testing period was sinus rhythm.  · Average HR: 60. Min HR: 47. Max HR: 158.  · There were 5 episodes of supraventricular tachycardia. The peak heart rate was 134 beats per minute. Longest 11 beats.  · Premature ventricular contractions occured rarely.         Subjective    Overall feeling well   No chest pain or shortness of breath   No palpitations  No significant pedal edema  Compliant with medications and dietary advice  Good effort tolerance  No presyncope or syncope  Compliant with medications        History of present illness:  Robin Herrera is a 72 y.o. yo male with history of carotid stenosis who presents today for   Chief Complaint   Patient presents with   • Coronary Artery Disease     6 MON FU    .    History  Past Medical History:   Diagnosis Date   • AAA (abdominal aortic aneurysm) (CMS/HCC)    • Atrial fibrillation (CMS/HCC)    • CAD in native artery    • Headache    • Hypertension    ,   Past Surgical History:   Procedure Laterality Date   • CARDIAC CATHETERIZATION     • CAROTID STENT     • CAROTID STENT      Right    • CORONARY STENT PLACEMENT      x 1 - 2010   • EYE SURGERY Bilateral     cataracts    • PROSTATECTOMY     • TONSILLECTOMY     • VEIN SURGERY      vascular / Dr. Ty   ,   Family History   Problem Relation Age of Onset   • Alzheimer's disease Father    • Kidney failure Father    • Stroke Father    • Coronary artery disease Neg Hx    ,   Social History   Substance Use Topics   • Smoking status: Current Every Day Smoker     Packs/day: 0.50     Types: Cigarettes   •  Smokeless tobacco: Never Used      Comment: 03/2017   • Alcohol use Yes   ,     Medications  Current Outpatient Prescriptions   Medication Sig Dispense Refill   • amLODIPine (NORVASC) 10 MG tablet Take 1 tablet by mouth Daily. 90 tablet 3   • aspirin 325 MG tablet Take 325 mg by mouth daily.     • atenolol (TENORMIN) 25 MG tablet Take 1 tablet by mouth Daily. 30 tablet 11   • atorvastatin (LIPITOR) 40 MG tablet Take 1 tablet by mouth Daily. 30 tablet 11   • cholecalciferol (VITAMIN D3) 1000 UNITS tablet Take 1,000 Units by mouth daily.     • clopidogrel (PLAVIX) 75 MG tablet TAKE 1 TABLET BY MOUTH DAILY. 30 tablet 7   • coenzyme Q10 100 MG capsule Take 100 mg by mouth daily.     • levothyroxine (SYNTHROID, LEVOTHROID) 137 MCG tablet Take 137 mcg by mouth Daily.     • lisinopril (PRINIVIL,ZESTRIL) 20 MG tablet Take 1 tablet by mouth Daily. 30 tablet 11   • Multiple Vitamins-Minerals (MULTIVITAMIN ADULT PO) Take  by mouth.     • nitroglycerin (NITROSTAT) 0.4 MG SL tablet Place 0.4 mg under the tongue every 5 (five) minutes as needed for chest pain. Take no more than 3 doses in 15 minutes.     • ferrous sulfate 325 (65 FE) MG tablet TAKE ONE TABLET BY MOUTH TWICE DAILY     • nicotine polacrilex (NICORETTE) 4 MG gum Chew 4 mg As Needed for smoking cessation.     • PrednisoLONE 5 MG tablet Take  by mouth.       No current facility-administered medications for this visit.        Allergies:  Pollen extract    Review of Systems  Review of Systems   Constitution: Positive for malaise/fatigue.   HENT: Negative.    Eyes: Negative.    Cardiovascular: Negative for chest pain, claudication, cyanosis, dyspnea on exertion, irregular heartbeat, leg swelling, near-syncope, orthopnea, palpitations, paroxysmal nocturnal dyspnea and syncope.   Respiratory: Negative.    Endocrine: Negative.    Hematologic/Lymphatic: Negative.    Skin: Negative.    Musculoskeletal: Positive for arthritis.   Gastrointestinal: Negative for anorexia.  "  Genitourinary: Negative.    Neurological: Negative.    Psychiatric/Behavioral: Negative.        Objective     Physical Exam:  /70   Pulse 65   Ht 182.9 cm (72.01\")   Wt 96.6 kg (213 lb)   SpO2 98%   BMI 28.88 kg/m²   Physical Exam   Constitutional: He appears well-developed.   HENT:   Head: Normocephalic.   Neck: Normal carotid pulses and no JVD present. No tracheal tenderness present. Carotid bruit is not present. No tracheal deviation and no edema present.   Cardiovascular: Regular rhythm, normal heart sounds and normal pulses.    Pulmonary/Chest: Effort normal. No stridor.   Abdominal: Soft.   Neurological: He is alert. He has normal strength. No cranial nerve deficit or sensory deficit.   Skin: Skin is warm.   Psychiatric: He has a normal mood and affect. His speech is normal and behavior is normal.       Results Review:       ECG 12 Lead  Date/Time: 8/14/2018 12:47 PM  Performed by: STEPHANIE DOWLING  Authorized by: STEPHANIE DOWLING   Comparison: compared with previous ECG from 1/17/2018  Similar to previous ECG  Rhythm: sinus bradycardia  Rate: bradycardic  Conduction: non-specific intraventricular conduction delay  ST Segments: ST segments normal  T Waves: T waves normal  QRS axis: normal  Other: no other findings  Clinical impression: abnormal ECG            Assessment/Plan   Patient Active Problem List   Diagnosis   • Hyperlipemia   • Essential hypertension   • Coronary artery disease involving native coronary artery of native heart without angina pectoris   • Stented coronary artery   • Atrial fibrillation (CMS/HCC)     Results for orders placed during the hospital encounter of 09/16/16   Adult Stress Echo With Color & Doppler    Narrative [facility] - CARDIOLOGY STRESS ECHO    PATIENT:       DAYDAY CONN  MRN:           [id number]  VISIT ID:      [id number]  CATH DR:       Stephanie Dowling*  DATE:          09/16/2016  ROOM:          " "  ______________________________________________________________________________      IMPRESSION:  1. Ejection fraction of 70%.   2. No areas of ischemia or scar.            No palpitations. No significant pedal edema. Compliant with medications and diet. Latest labs and medications reviewed.  Zio patch shows minor ectopy and SVT    Plan:    Low salt/ HTN/ Heart healthy carbohydrate restricted cardiac diet as applicable to this patient's current diagnoses.   This handout has relevant information regarding shopping for food, preparing meals, what to eat at restaurants, tracking of food intake, information regarding sodium intake and salt content, how to read food labels, knowing what to eat, tips reagarding physical activity, calorie count and calorie expenditure. What foods to avoid. Information regarding alcoholic drinks along with \"good\" and \"bad\" fats.  Reiterated prior recommendations     The patient is advised to reduce or avoid caffeine or other cardiac stimulants.     The patient was advised that NSAID-type medications have three  very important potential side effects: cardiovascular complications, gastrointestinal irritation including hemorrhage and renal injuries.  Do not use anti-inflammatories such as Motrin/ibuprofen, Alleve/naprosyn, Mobic and like medications Use Tylenol instead.The patient expresses understanding of these issues and questions were answered.   Monitor for any signs of bleeding including red or dark stools. Fall precautions.       Monitor for any signs of bleeding including red or dark stools. Fall precautions.   Patient is asked to monitor BP at home or work, several times per month and return with written values at next office visit.     Advised staying uptodate with immunizations per established standard guidelines.    Reviewed available prior notes, consults, prior visits, laboratory findings, radiology And cardiology relevant reports. Updated chart as applicable. I have reviewed " the patient's medical history in detail and updated the computerized patient record as relevant.    Updated patient regarding any new or relevant abnormalities on review of records or any new findings on physical exam. Mentioned to patient about purpose of visit and desirable health short and long term goals and objectives.    Offered to give patient  a copy of my notes and relevant tests/ prior ECG etc for the patient to review and follow specific advise and relevant findings if any, prognosis, along with my current and future plans.      Questions were encouraged, asked and answered to the patient's  understanding and satisfaction. Questions if any regarding current medications and side effects, need for refills and importance of compliance to medications stressed.    Reviewed available prior notes, consults, prior visits, laboratory findings, radiology and cardiology relevant reports. Updated chart as applicable. I have reviewed the patient's medical history in detail and updated the computerized patient record as relevant.    Updated patient regarding any new or relevant abnormalities on review of records or any new findings on physical exam. Mentioned to patient about purpose of visit and desirable health short and long term goals and objectives.    Keep LDL below 70 mg/dl. Monitor liver and renal functions.   Monitor CBC, CMP, TSH (as indicated) and Lipid Panel by primary     No additional cardiac testing required at this point in time.         Return in about 6 months (around 2/14/2019).

## 2018-11-27 DIAGNOSIS — C61 MALIGNANT NEOPLASM PROSTATE (HCC): ICD-10-CM

## 2018-11-27 LAB — PROSTATE SPECIFIC ANTIGEN: 0.03 NG/ML (ref 0–4)

## 2018-12-03 ENCOUNTER — OFFICE VISIT (OUTPATIENT)
Dept: UROLOGY | Age: 73
End: 2018-12-03
Payer: MEDICARE

## 2018-12-03 VITALS — TEMPERATURE: 97.6 F | WEIGHT: 217 LBS | HEIGHT: 72 IN | BODY MASS INDEX: 29.39 KG/M2

## 2018-12-03 DIAGNOSIS — C61 MALIGNANT NEOPLASM OF PROSTATE (HCC): Primary | ICD-10-CM

## 2018-12-03 LAB
BILIRUBIN, POC: NORMAL
BLOOD URINE, POC: NORMAL
CLARITY, POC: CLEAR
COLOR, POC: YELLOW
GLUCOSE URINE, POC: NORMAL
KETONES, POC: NORMAL
LEUKOCYTE EST, POC: NORMAL
NITRITE, POC: NORMAL
PH, POC: 5
PROTEIN, POC: NORMAL
SPECIFIC GRAVITY, POC: 1.02
UROBILINOGEN, POC: 0.2

## 2018-12-03 PROCEDURE — 4004F PT TOBACCO SCREEN RCVD TLK: CPT | Performed by: UROLOGY

## 2018-12-03 PROCEDURE — G8484 FLU IMMUNIZE NO ADMIN: HCPCS | Performed by: UROLOGY

## 2018-12-03 PROCEDURE — G8427 DOCREV CUR MEDS BY ELIG CLIN: HCPCS | Performed by: UROLOGY

## 2018-12-03 PROCEDURE — G8417 CALC BMI ABV UP PARAM F/U: HCPCS | Performed by: UROLOGY

## 2018-12-03 PROCEDURE — 1123F ACP DISCUSS/DSCN MKR DOCD: CPT | Performed by: UROLOGY

## 2018-12-03 PROCEDURE — G8598 ASA/ANTIPLAT THER USED: HCPCS | Performed by: UROLOGY

## 2018-12-03 PROCEDURE — 99214 OFFICE O/P EST MOD 30 MIN: CPT | Performed by: UROLOGY

## 2018-12-03 PROCEDURE — 1101F PT FALLS ASSESS-DOCD LE1/YR: CPT | Performed by: UROLOGY

## 2018-12-03 PROCEDURE — 3017F COLORECTAL CA SCREEN DOC REV: CPT | Performed by: UROLOGY

## 2018-12-03 PROCEDURE — 4040F PNEUMOC VAC/ADMIN/RCVD: CPT | Performed by: UROLOGY

## 2018-12-03 PROCEDURE — 81003 URINALYSIS AUTO W/O SCOPE: CPT | Performed by: UROLOGY

## 2018-12-03 ASSESSMENT — ENCOUNTER SYMPTOMS
EYE DISCHARGE: 0
COLOR CHANGE: 0
EYE REDNESS: 0
ABDOMINAL DISTENTION: 0
WHEEZING: 0
SINUS PRESSURE: 0
FACIAL SWELLING: 0
EYE PAIN: 0
SORE THROAT: 0
SHORTNESS OF BREATH: 0
COUGH: 0
BLOOD IN STOOL: 0
BACK PAIN: 0
DIARRHEA: 0
CONSTIPATION: 0
VOMITING: 0
NAUSEA: 0
RHINORRHEA: 0
ABDOMINAL PAIN: 0

## 2018-12-03 NOTE — PROGRESS NOTES
ANGIOPLASTY OF LEFT COMMON AND EXTERNAL ILIAC ARTERIES AND RIGHT, EXTERNAL ILIAC ARTERY; LEFT COMMON FEMORAL ENDARTERECTOMY WITH BOVINE PATCH ANGIOPLASTY, SUPERFICIAL FEMORAL ENDARECTOMY WITH BOVINE PATCHING performed by Lety Rodriguez MD at [address] [facility] VASCULAR SURGERY  05/22/2017    Right CFA 5f-6f 90 cm sheath, Right EIA stent (Express 8x27), Arch aortogram, Right carotid/intracerebral arteriograms, Right distal ICA emboshield filter, Right ICA stent (xact 8-10x40), Right ICA stent balloon angioplasty 6x40 viatrac, Retrieval of emoshield filter, Right carotid/intracerebral arteriograms, Distal aortogram w/ bilateral iliofemoral arterigrams, Mynx right CFA-- [facility] VASCULAR SURGERY  07/25/2017    [facility]. Right CFA 5f sheath,aortogram with bilateral lower arteriogram,mynx right CFA.  VASCULAR SURGERY  08/09/2017    [facility] Percutaneous cannulation right common femoral artery with 5 Irish glide sheath. Suprarenal abdominal aortogram with bilateral lower extremity arteriogram.Mynx closure right common femoral artery puncture site.     WRIST FRACTURE SURGERY Right        Current Outpatient Prescriptions   Medication Sig Dispense Refill    nicotine polacrilex (NICORETTE) 4 MG gum 4 mg      clopidogrel (PLAVIX) 75 MG tablet Take 1 tablet by mouth daily 30 tablet 0    Multiple Vitamins-Minerals (MULTI FOR HIM 50+ PO) Take 1 tablet by mouth daily       levothyroxine (SYNTHROID) 125 MCG tablet Take 137 mcg by mouth Daily       amLODIPine (NORVASC) 5 MG tablet Take 5 mg by mouth daily Indications: High Blood Pressure       aspirin 325 MG tablet Take 325 mg by mouth daily       atenolol (TENORMIN) 50 MG tablet Take 25 mg by mouth daily Indications: High Blood Pressure       atorvastatin (LIPITOR) 20 MG tablet Take 40 mg by mouth nightly Indications: Changes in Cholesterol       Cholecalciferol (VITAMIN D3) 1000 UNITS TABS Take 1,000 Units by mouth daily

## 2019-01-02 ENCOUNTER — HOSPITAL ENCOUNTER (OUTPATIENT)
Dept: VASCULAR LAB | Age: 74
Discharge: HOME | End: 2019-01-02
Payer: MEDICARE

## 2019-01-02 ENCOUNTER — HOSPITAL ENCOUNTER (OUTPATIENT)
Dept: ULTRASOUND IMAGING | Age: 74
Discharge: HOME | End: 2019-01-02
Payer: MEDICARE

## 2019-01-02 ENCOUNTER — OFFICE VISIT (OUTPATIENT)
Dept: VASCULAR SURGERY | Age: 74
End: 2019-01-02
Payer: MEDICARE

## 2019-01-02 VITALS
OXYGEN SATURATION: 98 % | SYSTOLIC BLOOD PRESSURE: 124 MMHG | DIASTOLIC BLOOD PRESSURE: 70 MMHG | HEART RATE: 66 BPM | RESPIRATION RATE: 18 BRPM

## 2019-01-02 DIAGNOSIS — I71.40 AAA (ABDOMINAL AORTIC ANEURYSM) WITHOUT RUPTURE: Primary | ICD-10-CM

## 2019-01-02 DIAGNOSIS — I70.213 ATHEROSCLEROSIS OF NATIVE ARTERY OF BOTH LOWER EXTREMITIES WITH INTERMITTENT CLAUDICATION (HCC): ICD-10-CM

## 2019-01-02 DIAGNOSIS — I65.23 BILATERAL CAROTID ARTERY STENOSIS: ICD-10-CM

## 2019-01-02 DIAGNOSIS — I71.40 AAA (ABDOMINAL AORTIC ANEURYSM) WITHOUT RUPTURE: ICD-10-CM

## 2019-01-02 PROCEDURE — 1123F ACP DISCUSS/DSCN MKR DOCD: CPT | Performed by: NURSE PRACTITIONER

## 2019-01-02 PROCEDURE — 99212 OFFICE O/P EST SF 10 MIN: CPT | Performed by: NURSE PRACTITIONER

## 2019-01-02 PROCEDURE — 4040F PNEUMOC VAC/ADMIN/RCVD: CPT | Performed by: NURSE PRACTITIONER

## 2019-01-02 PROCEDURE — G8598 ASA/ANTIPLAT THER USED: HCPCS | Performed by: NURSE PRACTITIONER

## 2019-01-02 PROCEDURE — 3017F COLORECTAL CA SCREEN DOC REV: CPT | Performed by: NURSE PRACTITIONER

## 2019-01-02 PROCEDURE — G8427 DOCREV CUR MEDS BY ELIG CLIN: HCPCS | Performed by: NURSE PRACTITIONER

## 2019-01-02 PROCEDURE — 4004F PT TOBACCO SCREEN RCVD TLK: CPT | Performed by: NURSE PRACTITIONER

## 2019-01-02 PROCEDURE — G8484 FLU IMMUNIZE NO ADMIN: HCPCS | Performed by: NURSE PRACTITIONER

## 2019-01-02 PROCEDURE — G8417 CALC BMI ABV UP PARAM F/U: HCPCS | Performed by: NURSE PRACTITIONER

## 2019-01-02 PROCEDURE — 93978 VASCULAR STUDY: CPT

## 2019-01-02 PROCEDURE — 93880 EXTRACRANIAL BILAT STUDY: CPT

## 2019-01-02 PROCEDURE — 1101F PT FALLS ASSESS-DOCD LE1/YR: CPT | Performed by: NURSE PRACTITIONER

## 2019-01-02 PROCEDURE — 93923 UPR/LXTR ART STDY 3+ LVLS: CPT

## 2019-01-29 RX ORDER — AMLODIPINE BESYLATE 10 MG/1
10 TABLET ORAL DAILY
Qty: 90 TABLET | Refills: 2 | Status: SHIPPED | OUTPATIENT
Start: 2019-01-29 | End: 2019-10-23 | Stop reason: SDUPTHER

## 2019-02-23 RX ORDER — CLOPIDOGREL BISULFATE 75 MG/1
75 TABLET ORAL DAILY
Qty: 30 TABLET | Refills: 6 | Status: SHIPPED | OUTPATIENT
Start: 2019-02-23 | End: 2019-08-30 | Stop reason: SDUPTHER

## 2019-03-06 ENCOUNTER — TRANSCRIBE ORDERS (OUTPATIENT)
Dept: ADMINISTRATIVE | Facility: HOSPITAL | Age: 74
End: 2019-03-06

## 2019-03-06 DIAGNOSIS — N42.31: Primary | ICD-10-CM

## 2019-03-06 DIAGNOSIS — C61 PROSTATE CA (HCC): ICD-10-CM

## 2019-03-11 ENCOUNTER — APPOINTMENT (OUTPATIENT)
Dept: CT IMAGING | Facility: HOSPITAL | Age: 74
End: 2019-03-11

## 2019-03-14 ENCOUNTER — APPOINTMENT (OUTPATIENT)
Dept: CT IMAGING | Facility: HOSPITAL | Age: 74
End: 2019-03-14

## 2019-04-16 ENCOUNTER — HOSPITAL ENCOUNTER (OUTPATIENT)
Dept: NON INVASIVE DIAGNOSTICS | Age: 74
Discharge: HOME | End: 2019-04-16
Payer: MEDICARE

## 2019-04-16 ENCOUNTER — OFFICE VISIT (OUTPATIENT)
Dept: VASCULAR SURGERY | Age: 74
End: 2019-04-16
Payer: MEDICARE

## 2019-04-16 VITALS
DIASTOLIC BLOOD PRESSURE: 76 MMHG | SYSTOLIC BLOOD PRESSURE: 132 MMHG | OXYGEN SATURATION: 98 % | RESPIRATION RATE: 18 BRPM | TEMPERATURE: 97.2 F | HEART RATE: 55 BPM

## 2019-04-16 DIAGNOSIS — M79.89 LEG SWELLING: ICD-10-CM

## 2019-04-16 DIAGNOSIS — M79.605 LEG PAIN, LEFT: ICD-10-CM

## 2019-04-16 DIAGNOSIS — I73.9 PVD (PERIPHERAL VASCULAR DISEASE) (HCC): ICD-10-CM

## 2019-04-16 DIAGNOSIS — M79.605 LEG PAIN, LEFT: Primary | ICD-10-CM

## 2019-04-16 PROCEDURE — G8427 DOCREV CUR MEDS BY ELIG CLIN: HCPCS | Performed by: NURSE PRACTITIONER

## 2019-04-16 PROCEDURE — 4004F PT TOBACCO SCREEN RCVD TLK: CPT | Performed by: NURSE PRACTITIONER

## 2019-04-16 PROCEDURE — 1123F ACP DISCUSS/DSCN MKR DOCD: CPT | Performed by: NURSE PRACTITIONER

## 2019-04-16 PROCEDURE — G8417 CALC BMI ABV UP PARAM F/U: HCPCS | Performed by: NURSE PRACTITIONER

## 2019-04-16 PROCEDURE — 3017F COLORECTAL CA SCREEN DOC REV: CPT | Performed by: NURSE PRACTITIONER

## 2019-04-16 PROCEDURE — 99212 OFFICE O/P EST SF 10 MIN: CPT | Performed by: NURSE PRACTITIONER

## 2019-04-16 PROCEDURE — 93971 EXTREMITY STUDY: CPT

## 2019-04-16 PROCEDURE — 4040F PNEUMOC VAC/ADMIN/RCVD: CPT | Performed by: NURSE PRACTITIONER

## 2019-04-16 PROCEDURE — G8598 ASA/ANTIPLAT THER USED: HCPCS | Performed by: NURSE PRACTITIONER

## 2019-04-16 NOTE — PROGRESS NOTES
Patient Care Team:  Bria Jane as PCP - Darell Solomon MD as Consulting Physician (Vascular Surgery)  Marlyn Vuong MD as Neurologist (Neurology)      He presents for evaluation of pain and swelling. He has no known history of DVT. His current treatment includes ASA 81 mg po qd, clopidogrel 75 mg po qd. He does not have a family history of hypercoagulability. Risk factors for hypercoagulable state include: none known. He does not have an IVC filter. He has symptoms involving   left leg. Symptoms include pain, swelling Onset was abrupt 3 days ago. These symptoms have been gradually worsening. There has been 1 episode. .    Differential diagnosis includes but is not limited to CHF, thyroid disease, venous disease, DVT, SVT, peripheral vascular disease. Karlyn Closs is a 68 y.o. male with the following history reviewed and [facility] in [facility]:  Patient Active Problem List    Diagnosis Date Noted    Memory loss 05/02/2017    Abnormal brain scan 05/02/2017    Carotid disease, bilateral ([facility] [address] 05/02/2017    Paroxysmal atrial fibrillation ([facility] [address] 04/15/2017    Coronary artery disease involving native coronary artery of native heart without angina pectoris      sees dr. Eileen Beth Hyperlipidemia     Hyperthyroidism     Right BBB/left ant fasc block      per dr. Hutchins Keepers; awaiting ekg from [facility]/office.       Essential hypertension     Sleep apnea      cpap used      AAA (abdominal aortic aneurysm) without rupture (HCC)     Atherosclerosis of native artery of both lower extremities with intermittent claudication (HCC)     Bilateral carotid artery stenosis     Malignant neoplasm of prostate ([facility] [address] 10/05/2016    Presence of stent in coronary artery 09/27/2016     Current Outpatient Medications   Medication Sig Dispense Refill    nicotine polacrilex (NICORETTE) 4 MG gum 4 mg      clopidogrel (PLAVIX) 75 MG tablet Take 1 tablet by mouth daily 30 tablet 0    Multiple Vitamins-Minerals (MULTI FOR HIM 50+ PO) Take 1 tablet by mouth daily       levothyroxine (SYNTHROID) 125 MCG tablet Take 137 mcg by mouth Daily       amLODIPine (NORVASC) 5 MG tablet Take 5 mg by mouth daily Indications: High Blood Pressure       aspirin 325 MG tablet Take 325 mg by mouth daily       atenolol (TENORMIN) 50 MG tablet Take 25 mg by mouth daily Indications: High Blood Pressure       atorvastatin (LIPITOR) 20 MG tablet Take 40 mg by mouth nightly Indications: Changes in Cholesterol       Cholecalciferol (VITAMIN D3) 1000 UNITS TABS Take 1,000 Units by mouth daily       coenzyme Q10 100 MG CAPS capsule Take 100 mg by mouth daily       lisinopril (PRINIVIL;ZESTRIL) 20 MG tablet Take 20 mg by mouth daily       nitroGLYCERIN (NITROSTAT) 0.4 MG SL tablet Place 0.4 mg under the tongue every 5 minutes as needed        No current facility-administered medications for this visit. Allergies: Pollen extract  Past Medical History:   Diagnosis Date    AAA (abdominal aortic aneurysm) without rupture (HCC)     Arthritis     Atheroscler of native artery of both legs with intermit claudication ([facility] [address]     BPH without obstruction/lower urinary tract symptoms     CAD in native artery     sees dr. [place] Carotid artery stenosis     History of blood transfusion     Hyperlipidemia     Hypertension     Hyperthyroidism     Iron deficiency     Malignant neoplasm prostate ([facility] [address]     surgery    Right BBB/left ant fasc block     per dr. Mohini Zamora; awaiting ekg from [facility]/office.  Sleep apnea     cpap used    TIA (transient ischemic attack)      Past Surgical History:   Procedure Laterality Date    CAROTID ENDARTERECTOMY Left 4/12/2017    [facility]. Left carotid endarterectomy with bovine pericardial catch. Left cervical carotid arteriograms.     CATARACT REMOVAL Bilateral     CORONARY ANGIOPLASTY WITH STENT PLACEMENT  2010    dr. Colonel Jackson Left 8/16/2017    WOUND EXPLORATION FOR POST-OP BLEEDING performed by Zayra Leavitt MD chills. No diaphoresis or significant fatigue. HENT - no significant rhinorrhea or epistaxis. No tinnitus or significant hearing loss. Eyes - no sudden vision change or amaurosis. Respiratory - no significant shortness of breath, wheezing, or stridor. No apnea, cough, or chest tightness associated with shortness of breath. Cardiovascular - no chest pain, syncope, or significant dizziness. No palpitations or significant leg swelling. No claudication. Gastrointestinal - no abdominal swelling or pain. No blood in stool. No severe constipation, diarrhea, nausea, or vomiting. Genitourinary - No difficulty urinating, dysuria, frequency, or urgency. No flank pain or hematuria. Musculoskeletal - no back pain, gait disturbance, or myalgia. Skin - no color change, rash, pallor, or new wound. Neurologic - no dizziness, facial asymmetry, or light headedness. No seizures. No speech difficulty or lateralizing weakness. Hematologic - no easy bruising or excessive bleeding. Psychiatric - no severe anxiety or nervousness. No confusion. All other review of systems are negative. Physical Exam    /76 Comment: right  Pulse 55   Temp 97.2 °F (36.2 °C)   Resp 18   SpO2 98%     Constitutional - well developed, well nourished. No diaphoresis or acute distress. HENT - head normocephalic. Right external ear canal appears normal.  Left external ear canal appears normal.  Septum appears midline. Eyes - conjunctiva normal.  EOMS normal.  No exudate. No icterus. Neck- ROM appears normal, no tracheal deviation. Cardiovascular - Regular rate and rhythm. Heart sounds are normal.  No murmur, rub, or gallop. Carotid pulses are 2+ to palpation bilaterally without bruit. Extremities - Radial and brachial pulses are 2+ to palpation bilaterally. Right femoral pulse: present 2+; Right popliteal pulse: absent Right DP: absent; Right PT absent;  Left femoral pulse: present 2+; Left popliteal pulse: absent; Left DP: absent; Left PT: absent   Pulmonary - effort appears normal.  No respiratory distress. Lungs - Breath sounds normal. No wheezes or rales. GI - Abdomen - soft, non tender, bowel sounds X 4 quadrants. No guarding or rebound tenderness. No distension or palpable mass. Genitourinary - deferred. Musculoskeletal - ROM appears normal.  No significant edema. Neurologic - alert and oriented X 3. Physiologic. Skin - warm, dry, and intact. No rash, erythema, or pallor. Psychiatric - mood, affect, and behavior appear normal.  Judgment and thought processes appear normal.    Options have been discussed with the patient including continued medical management. Patient has opted to proceed with continued medical management. Assessment    1. Leg pain, left    2.  Leg swelling          Plan        Venous scan left leg - this is negative  Will folllow up in 3 months with nora

## 2019-06-04 DIAGNOSIS — C61 MALIGNANT NEOPLASM OF PROSTATE (HCC): ICD-10-CM

## 2019-06-04 LAB — PROSTATE SPECIFIC ANTIGEN: 0.03 NG/ML (ref 0–4)

## 2019-06-10 ENCOUNTER — OFFICE VISIT (OUTPATIENT)
Dept: UROLOGY | Age: 74
End: 2019-06-10
Payer: MEDICARE

## 2019-06-10 VITALS — TEMPERATURE: 97.9 F | BODY MASS INDEX: 28.99 KG/M2 | HEIGHT: 72 IN | WEIGHT: 214 LBS

## 2019-06-10 DIAGNOSIS — C61 MALIGNANT NEOPLASM OF PROSTATE (HCC): Primary | ICD-10-CM

## 2019-06-10 LAB
BILIRUBIN, POC: NORMAL
BLOOD URINE, POC: NORMAL
CLARITY, POC: CLEAR
COLOR, POC: YELLOW
GLUCOSE URINE, POC: NORMAL
KETONES, POC: NORMAL
LEUKOCYTE EST, POC: NORMAL
NITRITE, POC: NORMAL
PH, POC: 5.5
PROTEIN, POC: NORMAL
SPECIFIC GRAVITY, POC: 1
UROBILINOGEN, POC: 0.2

## 2019-06-10 PROCEDURE — 3017F COLORECTAL CA SCREEN DOC REV: CPT | Performed by: UROLOGY

## 2019-06-10 PROCEDURE — G8598 ASA/ANTIPLAT THER USED: HCPCS | Performed by: UROLOGY

## 2019-06-10 PROCEDURE — 99213 OFFICE O/P EST LOW 20 MIN: CPT | Performed by: UROLOGY

## 2019-06-10 PROCEDURE — 81003 URINALYSIS AUTO W/O SCOPE: CPT | Performed by: UROLOGY

## 2019-06-10 PROCEDURE — 4040F PNEUMOC VAC/ADMIN/RCVD: CPT | Performed by: UROLOGY

## 2019-06-10 PROCEDURE — G8427 DOCREV CUR MEDS BY ELIG CLIN: HCPCS | Performed by: UROLOGY

## 2019-06-10 PROCEDURE — 4004F PT TOBACCO SCREEN RCVD TLK: CPT | Performed by: UROLOGY

## 2019-06-10 PROCEDURE — 1123F ACP DISCUSS/DSCN MKR DOCD: CPT | Performed by: UROLOGY

## 2019-06-10 PROCEDURE — G8417 CALC BMI ABV UP PARAM F/U: HCPCS | Performed by: UROLOGY

## 2019-06-10 ASSESSMENT — ENCOUNTER SYMPTOMS
ABDOMINAL PAIN: 0
EYE PAIN: 0
COLOR CHANGE: 0
SINUS PRESSURE: 0
EYE DISCHARGE: 0
FACIAL SWELLING: 0
BLOOD IN STOOL: 0
EYE REDNESS: 0
NAUSEA: 0
SHORTNESS OF BREATH: 0
COUGH: 0
BACK PAIN: 0
SORE THROAT: 0
DIARRHEA: 0
CONSTIPATION: 0
VOMITING: 0
RHINORRHEA: 0
WHEEZING: 0
ABDOMINAL DISTENTION: 0

## 2019-06-10 NOTE — PROGRESS NOTES
8/16/2017    PERCUTANEOUS TRANSLUMINAL BALLOON ANGIOPLASTY OF LEFT COMMON AND EXTERNAL ILIAC ARTERIES AND RIGHT, EXTERNAL ILIAC ARTERY; LEFT COMMON FEMORAL ENDARTERECTOMY WITH BOVINE PATCH ANGIOPLASTY, SUPERFICIAL FEMORAL ENDARECTOMY WITH BOVINE PATCHING performed by Lesvia Good MD at [address] [facility] VASCULAR SURGERY  05/22/2017    Right CFA 5f-6f 90 cm sheath, Right EIA stent (Express 8x27), Arch aortogram, Right carotid/intracerebral arteriograms, Right distal ICA emboshield filter, Right ICA stent (xact 8-10x40), Right ICA stent balloon angioplasty 6x40 viatrac, Retrieval of emoshield filter, Right carotid/intracerebral arteriograms, Distal aortogram w/ bilateral iliofemoral arterigrams, Mynx right CFA-- [facility] VASCULAR SURGERY  07/25/2017    [facility]. Right CFA 5f sheath,aortogram with bilateral lower arteriogram,mynx right CFA.  VASCULAR SURGERY  08/09/2017    [facility] Percutaneous cannulation right common femoral artery with 5 [place] glide sheath. Suprarenal abdominal aortogram with bilateral lower extremity arteriogram.Mynx closure right common femoral artery puncture site.     WRIST FRACTURE SURGERY Right        Current Outpatient Medications   Medication Sig Dispense Refill    nicotine polacrilex (NICORETTE) 4 MG gum 4 mg      clopidogrel (PLAVIX) 75 MG tablet Take 1 tablet by mouth daily 30 tablet 0    Multiple Vitamins-Minerals (MULTI FOR HIM 50+ PO) Take 1 tablet by mouth daily       levothyroxine (SYNTHROID) 125 MCG tablet Take 137 mcg by mouth Daily       amLODIPine (NORVASC) 5 MG tablet Take 5 mg by mouth daily Indications: High Blood Pressure       aspirin 325 MG tablet Take 325 mg by mouth daily       atenolol (TENORMIN) 50 MG tablet Take 25 mg by mouth daily Indications: High Blood Pressure       atorvastatin (LIPITOR) 20 MG tablet Take 40 mg by mouth nightly Indications: Changes in Cholesterol       Cholecalciferol (VITAMIN D3) 1000 UNITS TABS Take 1,000 Units by mouth daily       coenzyme Q10 100 MG CAPS capsule Take 100 mg by mouth daily       lisinopril (PRINIVIL;ZESTRIL) 20 MG tablet Take 20 mg by mouth daily       nitroGLYCERIN (NITROSTAT) 0.4 MG SL tablet Place 0.4 mg under the tongue every 5 minutes as needed        No current facility-administered medications for this visit. Allergies   Allergen Reactions    Pollen Extract        Social History     Socioeconomic History    Marital status:      Spouse name: None    Number of children: None    Years of education: None    Highest education level: None   Occupational History    None   Social Needs    Financial resource strain: None    Food insecurity:     Worry: None     Inability: None    Transportation needs:     Medical: None     Non-medical: None   Tobacco Use    Smoking status: Light Tobacco Smoker     Packs/day: 0.25     Years: 50.00     Pack years: 12.50     Last attempt to quit: 4/10/2017     Years since quittin.1    Smokeless tobacco: Never Used    Tobacco comment: Pt is trying to quit   Substance and Sexual Activity    Alcohol use:  Yes     Alcohol/week: 1.2 oz     Types: 2 Cans of beer per week     Comment: 2 beers in the evening    Drug use: No    Sexual activity: None   Lifestyle    Physical activity:     Days per week: None     Minutes per session: None    Stress: None   Relationships    Social connections:     Talks on phone: None     Gets together: None     Attends Muslim service: None     Active member of club or organization: None     Attends meetings of clubs or organizations: None     Relationship status: None    Intimate partner violence:     Fear of current or ex partner: None     Emotionally abused: None     Physically abused: None     Forced sexual activity: None   Other Topics Concern    None   Social History Narrative    None       Family History   Problem Relation Age of Onset    Kidney Disease Father     Dementia Father         alzheimers    Ovarian Cancer Mother     Breast Cancer Sister        REVIEW OF SYSTEMS:  Review of Systems   Constitutional: Negative for activity change, chills, fatigue and fever. HENT: Negative for congestion, ear discharge, ear pain, facial swelling, mouth sores, rhinorrhea, sinus pressure and sore throat. Eyes: Negative for pain, discharge and redness. Respiratory: Negative for cough, shortness of breath and wheezing. Cardiovascular: Negative for chest pain, palpitations and leg swelling. Gastrointestinal: Negative for abdominal distention, abdominal pain, blood in stool, constipation, diarrhea, nausea and vomiting. Endocrine: Negative for polydipsia, polyphagia and polyuria. Genitourinary: Negative for decreased urine volume, difficulty urinating, dysuria, enuresis, flank pain, frequency, genital sores, hematuria and urgency. Musculoskeletal: Negative for back pain, gait problem, joint swelling, neck pain and neck stiffness. Skin: Negative for color change, rash and wound. Allergic/Immunologic: Negative for environmental allergies and immunocompromised state. Neurological: Negative for dizziness, syncope, weakness, light-headedness, numbness and headaches. Psychiatric/Behavioral: Negative for agitation, confusion, dysphoric mood, self-injury, sleep disturbance and suicidal ideas. The patient is not hyperactive. PHYSICAL EXAM:  Temp 97.9 °F (36.6 °C) (Temporal)   Ht 6' (1.829 m)   Wt 214 lb (97.1 kg)   BMI 29.02 kg/m²   Physical Exam   Constitutional: He is oriented to person, place, and time. He appears well-developed and well-nourished. No distress. HENT:   Head: Normocephalic and atraumatic. Nose: Nose normal.   Eyes: Pupils are equal, round, and reactive to light. Conjunctivae and EOM are normal. No scleral icterus. Neck: Normal range of motion. Neck supple. No tracheal deviation present.    Cardiovascular: Normal rate, regular rhythm and intact distal pulses. Pulmonary/Chest: Effort normal and breath sounds normal. No stridor. He exhibits no tenderness. Abdominal: Soft. Bowel sounds are normal. He exhibits no distension and no mass. There is no tenderness. Musculoskeletal: Normal range of motion. He exhibits no edema or tenderness. Lymphadenopathy:     He has no cervical adenopathy. Neurological: He is alert and oriented to person, place, and time. Skin: Skin is warm and dry. No erythema. Psychiatric: He has a normal mood and affect. His behavior is normal. Judgment normal.           DATA:  BMP:    Lab Results   Component Value Date     08/17/2017    K 4.5 08/17/2017     08/17/2017    CO2 24 08/17/2017    BUN 27 08/17/2017    LABALBU 4.2 04/15/2017    CREATININE 1.6 08/17/2017    CALCIUM 8.8 08/17/2017    LABGLOM 43 08/17/2017    GLUCOSE 141 08/17/2017     Results for orders placed or performed in visit on 06/10/19   POCT Urinalysis No Micro (Auto)   Result Value Ref Range    Color, UA yellow     Clarity, UA clear     Glucose, UA POC neg     Bilirubin, UA neg     Ketones, UA neg     Spec Grav, UA 1.005     Blood, UA POC trace     pH, UA 5.5     Protein, UA POC neg     Urobilinogen, UA 0.2     Leukocytes, UA neg     Nitrite, UA neg      Lab Results   Component Value Date    PSA 0.03 06/04/2019    PSA 0.03 11/27/2018    PSA 0.02 11/01/2017          1. Malignant neoplasm of prostate ([facility] [address]  Continue to monitor no intervention indicated at this time  - POCT Urinalysis No Micro (Auto)  - PSA, Diagnostic; Future      Orders Placed This Encounter   Procedures    PSA, Diagnostic     PSA in 6 month     Standing Status:   Future     Standing Expiration Date:   6/9/2020    POCT Urinalysis No Micro (Auto)        Return in about 6 months (around 12/10/2019) for PSA prior to vext visit. EMR Dragon/transcription disclaimer: Much of this documentt is electronic  transcription/translation of spoken language to printed text.  The  electronic translation of spoken language may be erroneous, or at times,  nonsensical words or phrases may be inadvertently transcribed.  Although I  have reviewed the document for such errors, some may still exist.

## 2019-06-14 ENCOUNTER — OFFICE VISIT (OUTPATIENT)
Dept: GASTROENTEROLOGY | Facility: CLINIC | Age: 74
End: 2019-06-14

## 2019-06-14 VITALS
HEART RATE: 68 BPM | TEMPERATURE: 97 F | BODY MASS INDEX: 28.99 KG/M2 | DIASTOLIC BLOOD PRESSURE: 74 MMHG | OXYGEN SATURATION: 98 % | SYSTOLIC BLOOD PRESSURE: 136 MMHG | HEIGHT: 72 IN | WEIGHT: 214 LBS

## 2019-06-14 DIAGNOSIS — Z79.01 ANTICOAGULATED: ICD-10-CM

## 2019-06-14 DIAGNOSIS — Z86.010 HISTORY OF ADENOMATOUS POLYP OF COLON: Primary | ICD-10-CM

## 2019-06-14 PROCEDURE — S0260 H&P FOR SURGERY: HCPCS | Performed by: NURSE PRACTITIONER

## 2019-06-14 NOTE — PROGRESS NOTES
Chief Complaint   Patient presents with   • Colonoscopy     6-4-14 had colon diverticulosis       PCP: Wilber Rivera Jr., MD  REFER: No ref. provider found    Subjective     HPI    Robin Herrera is a 73 y.o. male who presents to office for preventative maintenance.  There is  a personal history of colon polyps.  There is not a history of colon cancer.  He does not have complaints of nausea/vomiting, change in bowels, weight loss, no BRBPR, no melena.  There is not a family history of colon cancer.  There is not a family history of colon polyps.  He last colonoscopy-2014 .  Bowels do move on regular basis.    CScope (Dr Nielsen) 6/4/2014-diverticulosis   CScope (Dr Nielsen) 3/2014-inadequate prep, tubular adenoma, inverted diverticulum  CScope (Dr Nielsen) 2011-tubular adenoma        plavix for cardiac stent placed over one year ago    Past Medical History:   Diagnosis Date   • AAA (abdominal aortic aneurysm) (CMS/HCC)    • Atrial fibrillation (CMS/HCC)    • CAD in native artery    • Headache    • Hypertension      Past Surgical History:   Procedure Laterality Date   • CARDIAC CATHETERIZATION     • CAROTID STENT     • CAROTID STENT      Right    • COLONOSCOPY  06/04/2014    diverticulosis otherwise normal colonoscopy without evidence of recurrence of polyps   • CORONARY STENT PLACEMENT      x 1 - 2010   • EYE SURGERY Bilateral     cataracts    • PROSTATECTOMY     • TONSILLECTOMY     • VEIN SURGERY      vascular / Dr. Ty     Outpatient Medications Marked as Taking for the 6/14/19 encounter (Office Visit) with Monty Hardwick APRN   Medication Sig Dispense Refill   • amLODIPine (NORVASC) 10 MG tablet TAKE 1 TABLET BY MOUTH DAILY. 90 tablet 2   • aspirin 325 MG tablet Take 325 mg by mouth daily.     • atenolol (TENORMIN) 25 MG tablet Take 1 tablet by mouth Daily. 30 tablet 11   • atorvastatin (LIPITOR) 40 MG tablet Take 1 tablet by mouth Daily. 30 tablet 11   • cholecalciferol (VITAMIN D3) 1000 UNITS tablet  Take 1,000 Units by mouth daily.     • clopidogrel (PLAVIX) 75 MG tablet TAKE 1 TABLET BY MOUTH DAILY. 30 tablet 6   • coenzyme Q10 100 MG capsule Take 100 mg by mouth daily.     • levothyroxine (SYNTHROID, LEVOTHROID) 137 MCG tablet Take 137 mcg by mouth Daily.     • lisinopril (PRINIVIL,ZESTRIL) 20 MG tablet Take 1 tablet by mouth Daily. 30 tablet 11   • Multiple Vitamins-Minerals (MULTIVITAMIN ADULT PO) Take  by mouth.     • nitroglycerin (NITROSTAT) 0.4 MG SL tablet Place 0.4 mg under the tongue every 5 (five) minutes as needed for chest pain. Take no more than 3 doses in 15 minutes.       Allergies   Allergen Reactions   • Pollen Extract      Social History     Socioeconomic History   • Marital status:      Spouse name: Not on file   • Number of children: Not on file   • Years of education: Not on file   • Highest education level: Not on file   Tobacco Use   • Smoking status: Current Every Day Smoker     Packs/day: 0.25     Years: 50.00     Pack years: 12.50     Types: Cigarettes   • Smokeless tobacco: Never Used   • Tobacco comment: 03/2017   Substance and Sexual Activity   • Alcohol use: Yes     Comment: daily   • Drug use: No   • Sexual activity: Defer     Family History   Problem Relation Age of Onset   • Alzheimer's disease Father    • Kidney failure Father    • Stroke Father    • Coronary artery disease Neg Hx    • Colon cancer Neg Hx    • Colon polyps Neg Hx    • Esophageal cancer Neg Hx      Review of Systems   Constitutional: Negative for fatigue, fever and unexpected weight change.   HENT: Negative for hearing loss, sore throat and voice change.    Eyes: Negative for visual disturbance.   Respiratory: Negative for cough, shortness of breath and wheezing.    Cardiovascular: Negative for chest pain and palpitations.   Gastrointestinal: Negative for abdominal pain, blood in stool and vomiting.   Endocrine: Negative for polydipsia and polyuria.   Genitourinary: Negative for difficulty urinating,  dysuria, hematuria and urgency.   Musculoskeletal: Negative for joint swelling and myalgias.   Skin: Negative for color change, rash and wound.   Neurological: Negative for dizziness, tremors, seizures and syncope.   Hematological: Does not bruise/bleed easily.   Psychiatric/Behavioral: Negative for agitation and confusion. The patient is not nervous/anxious.      Objective   Vitals:    06/14/19 0932   BP: 136/74   Pulse: 68   Temp: 97 °F (36.1 °C)   SpO2: 98%     Physical Exam   Constitutional: He is oriented to person, place, and time. He appears well-developed and well-nourished. He is cooperative.   HENT:   Head: Normocephalic and atraumatic.   Eyes: Conjunctivae are normal. Pupils are equal, round, and reactive to light. No scleral icterus.   Neck: Normal range of motion. Neck supple. No JVD present. No thyroid mass and no thyromegaly present.   Cardiovascular: Normal rate, regular rhythm and normal heart sounds. Exam reveals no gallop and no friction rub.   No murmur heard.  Pulmonary/Chest: Effort normal and breath sounds normal. No accessory muscle usage. No respiratory distress. He has no wheezes. He has no rales.   Abdominal: Soft. Normal appearance and bowel sounds are normal. He exhibits no distension, no ascites and no mass. There is no hepatosplenomegaly. There is no tenderness. There is no rebound and no guarding.   Musculoskeletal: Normal range of motion. He exhibits no edema or tenderness.     Vascular Status -  His right foot exhibits normal foot vasculature  and no edema. His left foot exhibits normal foot vasculature  and no edema.  Lymphadenopathy:     He has no cervical adenopathy.   Neurological: He is alert and oriented to person, place, and time. He has normal strength. Gait normal.   Skin: Skin is warm, dry and intact. No rash noted.     Imaging Results (most recent)     None        Body mass index is 29.02 kg/m².    Assessment/Plan   Robin was seen today for colonoscopy.    Diagnoses and  all orders for this visit:    History of adenomatous polyp of colon  -     Case Request; Standing  -     Implement Anesthesia Orders Day of Procedure; Standing  -     Obtain Informed Consent; Standing  -     Case Request    Anticoagulated  Comments:  will ask plavix be held 5-7 days prior to procedure    Other orders  -     Discontinue: polyethylene glycol (GoLYTELY) 236 g solution; Take 3,785 mL by mouth 1 (One) Time for 1 dose. Take as directed      COLONOSCOPY WITH ANESTHESIA (N/A)  Declined Golytely  Educated miralax prep    Patient is to continue all blood pressure and cardiac medications prior to procedure and has been advised to take medications morning of procedure   Pt verbalized understanding    Advised pt to stop ASA 81 mg 1 day prior and  mg x 5 days prior , use of NSAIDs, Fish Oil, and MV 5 days prior to procedure, per Dr Nielsen protocol.  Tylenol based products are ok to take.  Pt verbalized understanding.      Patient is to hold their anticoagulation medication per the direction of their prescribing physician, Dr Mendoza. This is to prevent any risk or complication from bleeding intra and post procedure. If they develop bleeding post procedure they are to go the emergency department for further evaluation and treatment immediately    All risks, benefits, alternatives, and indications of colonoscopy procedure have been discussed with the patient. Risks to include perforation of the colon requiring possible surgery or colostomy, risk of bleeding from biopsies or removal of colon tissue, possibility of missing a colon polyp or cancer, or adverse drug reaction.  Benefits to include the diagnosis and management of disease of the colon and rectum. Alternatives to include barium enema, radiographic evaluation, lab testing or no intervention. He verbalizes understanding and agrees.     Patient's Body mass index is 29.02 kg/m². BMI is above normal parameters. Recommendations include: no follow up.    I  advised Robin of the risks of continuing to use tobacco, and I provided him with tobacco cessation educational materials in the After Visit Summary.     During this visit, I spent 3 minutes counseling the patient regarding tobacco cessation.      There are no Patient Instructions on file for this visit.

## 2019-06-14 NOTE — PROGRESS NOTES
No chief complaint on file.      PCP: Wilber Rivera Jr., MD  REFER: No ref. provider found    Subjective     HPI    Past Medical History:   Diagnosis Date   • AAA (abdominal aortic aneurysm) (CMS/HCC)    • Atrial fibrillation (CMS/HCC)    • CAD in native artery    • Headache    • Hypertension        Past Surgical History:   Procedure Laterality Date   • CARDIAC CATHETERIZATION     • CAROTID STENT     • CAROTID STENT      Right    • COLONOSCOPY  06/04/2014    diverticulosis otherwise normal colonoscopy without evidence of recurrence of polyps   • CORONARY STENT PLACEMENT      x 1 - 2010   • EYE SURGERY Bilateral     cataracts    • PROSTATECTOMY     • TONSILLECTOMY     • VEIN SURGERY      vascular / Dr. Ty       No outpatient medications have been marked as taking for the 6/14/19 encounter (Appointment) with Monty Hardwick APRN.       Allergies   Allergen Reactions   • Pollen Extract        Social History     Socioeconomic History   • Marital status:      Spouse name: Not on file   • Number of children: Not on file   • Years of education: Not on file   • Highest education level: Not on file   Tobacco Use   • Smoking status: Current Every Day Smoker     Packs/day: 0.50     Types: Cigarettes   • Smokeless tobacco: Never Used   • Tobacco comment: 03/2017   Substance and Sexual Activity   • Alcohol use: Yes   • Drug use: No   • Sexual activity: Defer       Family History   Problem Relation Age of Onset   • Alzheimer's disease Father    • Kidney failure Father    • Stroke Father    • Coronary artery disease Neg Hx        Review of Systems    Objective     There were no vitals filed for this visit.  There is no height or weight on file to calculate BMI.    Physical Exam    Imaging Results (most recent)     None          There is no height or weight on file to calculate BMI.    Assessment/Plan     There are no diagnoses linked to this encounter.    * Surgery not found *    Patient's There is no height or  weight on file to calculate BMI. BMI is {BMI range:24255}.      There are no Patient Instructions on file for this visit.

## 2019-06-27 ENCOUNTER — TELEPHONE (OUTPATIENT)
Dept: GASTROENTEROLOGY | Facility: CLINIC | Age: 74
End: 2019-06-27

## 2019-07-01 NOTE — TELEPHONE ENCOUNTER
Spoke with patient this morning- he is aware he is needing appt with Dr. Mendoza before getting clearance he stated he has an appointment on 07/08/2019 to see him.     Will f/u on this day.

## 2019-07-08 ENCOUNTER — TELEPHONE (OUTPATIENT)
Dept: GASTROENTEROLOGY | Facility: CLINIC | Age: 74
End: 2019-07-08

## 2019-07-08 ENCOUNTER — OFFICE VISIT (OUTPATIENT)
Dept: CARDIOLOGY | Facility: CLINIC | Age: 74
End: 2019-07-08

## 2019-07-08 VITALS
HEIGHT: 72 IN | OXYGEN SATURATION: 100 % | HEART RATE: 58 BPM | WEIGHT: 214 LBS | DIASTOLIC BLOOD PRESSURE: 74 MMHG | SYSTOLIC BLOOD PRESSURE: 162 MMHG | BODY MASS INDEX: 28.99 KG/M2

## 2019-07-08 DIAGNOSIS — I10 ESSENTIAL HYPERTENSION: ICD-10-CM

## 2019-07-08 DIAGNOSIS — I48.0 PAROXYSMAL ATRIAL FIBRILLATION (HCC): ICD-10-CM

## 2019-07-08 DIAGNOSIS — Z95.5 STENTED CORONARY ARTERY: Primary | ICD-10-CM

## 2019-07-08 DIAGNOSIS — I25.10 CORONARY ARTERY DISEASE INVOLVING NATIVE CORONARY ARTERY OF NATIVE HEART WITHOUT ANGINA PECTORIS: ICD-10-CM

## 2019-07-08 DIAGNOSIS — Z86.010 HISTORY OF ADENOMATOUS POLYP OF COLON: ICD-10-CM

## 2019-07-08 DIAGNOSIS — E78.2 MIXED HYPERLIPIDEMIA: ICD-10-CM

## 2019-07-08 PROCEDURE — 93000 ELECTROCARDIOGRAM COMPLETE: CPT | Performed by: INTERNAL MEDICINE

## 2019-07-08 PROCEDURE — 99214 OFFICE O/P EST MOD 30 MIN: CPT | Performed by: INTERNAL MEDICINE

## 2019-07-08 NOTE — PROGRESS NOTES
Robin Herrera  [id number]  1945  73 y.o.  male    Referring Provider: Wilber Rivera Jr., MD    Reason for Follow-up Visit:  Routine follow up.    preoperative cardiovascular clearance for colonoscopy    SVT      Subjective    Overall feeling well   No chest pain or shortness of breath     No palpitations  No significant pedal edema    Compliant with medications and dietary advice  Good effort tolerance    No presyncope or syncope  Compliant with medications      No new events or complaints since last visit   Says labs OK and checked by Dr Rivera      Can easily climb two flights of stairs    BP well controlled at home and Dr Rivera's office        History of present illness:  Robin Herrera is a 73 y.o. yo male with history of carotid stenosis who presents today for   Chief Complaint   Patient presents with   • Coronary Artery Disease     1 YR F/U   • Pre-op Exam     DR VARGAS - COLONOSCOPY - SCHEDULED FOR 7/16/19   .    History  Past Medical History:   Diagnosis Date   • AAA (abdominal aortic aneurysm) (CMS/HCC)    • Atrial fibrillation (CMS/HCC)    • CAD in native artery    • Headache    • Hypertension    ,   Past Surgical History:   Procedure Laterality Date   • CARDIAC CATHETERIZATION     • CAROTID STENT     • CAROTID STENT      Right    • COLONOSCOPY  06/04/2014    diverticulosis otherwise normal colonoscopy without evidence of recurrence of polyps   • CORONARY STENT PLACEMENT      x 1 - 2010   • EYE SURGERY Bilateral     cataracts    • PROSTATECTOMY     • TONSILLECTOMY     • VEIN SURGERY      vascular / Dr. Ty   ,   Family History   Problem Relation Age of Onset   • Alzheimer's disease Father    • Kidney failure Father    • Stroke Father    • Coronary artery disease Neg Hx    • Colon cancer Neg Hx    • Colon polyps Neg Hx    • Esophageal cancer Neg Hx    ,   Social History     Tobacco Use   • Smoking status: Current Every Day Smoker     Packs/day: 0.50     Years: 50.00     Pack years: 25.00      Types: Cigarettes   • Smokeless tobacco: Never Used   Substance Use Topics   • Alcohol use: Yes   • Drug use: No   ,     Medications  Current Outpatient Medications   Medication Sig Dispense Refill   • amLODIPine (NORVASC) 10 MG tablet TAKE 1 TABLET BY MOUTH DAILY. 90 tablet 2   • aspirin 325 MG tablet Take 325 mg by mouth daily.     • atenolol (TENORMIN) 25 MG tablet Take 1 tablet by mouth Daily. 30 tablet 11   • atorvastatin (LIPITOR) 40 MG tablet Take 1 tablet by mouth Daily. (Patient taking differently: Take 40 mg by mouth Daily. PT TAKES 80 MG EVERY OTHER DAY) 30 tablet 11   • cholecalciferol (VITAMIN D3) 1000 UNITS tablet Take 1,000 Units by mouth daily.     • clopidogrel (PLAVIX) 75 MG tablet TAKE 1 TABLET BY MOUTH DAILY. 30 tablet 6   • coenzyme Q10 100 MG capsule Take 100 mg by mouth daily.     • ferrous sulfate 325 (65 FE) MG tablet TAKE ONE TABLET BY MOUTH TWICE DAILY     • levothyroxine (SYNTHROID, LEVOTHROID) 137 MCG tablet Take 137 mcg by mouth Daily.     • lisinopril (PRINIVIL,ZESTRIL) 20 MG tablet Take 1 tablet by mouth Daily. 30 tablet 11   • Multiple Vitamins-Minerals (MULTIVITAMIN ADULT PO) Take  by mouth.     • nicotine polacrilex (NICORETTE) 4 MG gum Chew 4 mg As Needed for smoking cessation.     • nitroglycerin (NITROSTAT) 0.4 MG SL tablet Place 0.4 mg under the tongue every 5 (five) minutes as needed for chest pain. Take no more than 3 doses in 15 minutes.     • PrednisoLONE 5 MG tablet Take  by mouth.       No current facility-administered medications for this visit.        Allergies:  Pollen extract    Review of Systems  Review of Systems   Constitution: Positive for malaise/fatigue.   HENT: Negative.    Eyes: Negative.    Cardiovascular: Negative for chest pain, claudication, cyanosis, dyspnea on exertion, irregular heartbeat, leg swelling, near-syncope, orthopnea, palpitations, paroxysmal nocturnal dyspnea and syncope.   Respiratory: Negative.    Endocrine: Negative.   "  Hematologic/Lymphatic: Negative.    Skin: Negative.    Musculoskeletal: Positive for arthritis.   Gastrointestinal: Negative for anorexia.   Genitourinary: Negative.    Neurological: Negative.    Psychiatric/Behavioral: Negative.        Objective     Physical Exam:  /74   Pulse 58   Ht 182.9 cm (72\")   Wt 97.1 kg (214 lb)   SpO2 100%   BMI 29.02 kg/m²   Physical Exam   Constitutional: He appears well-developed.   HENT:   Head: Normocephalic.   Neck: Normal carotid pulses and no JVD present. No tracheal tenderness present. Carotid bruit is not present. No tracheal deviation and no edema present.   Cardiovascular: Regular rhythm, normal heart sounds and normal pulses.   Pulmonary/Chest: Effort normal. No stridor.   Abdominal: Soft.   Neurological: He is alert. He has normal strength. No cranial nerve deficit or sensory deficit.   Skin: Skin is warm.   Psychiatric: He has a normal mood and affect. His speech is normal and behavior is normal.       Results Review:       ECG 12 Lead  Date/Time: 7/8/2019 11:16 AM  Performed by: Rocky Mendoza MD  Authorized by: Rocky Mendoza MD   Comparison: compared with previous ECG from 8/14/2018  Similar to previous ECG  Rhythm: sinus rhythm  Rate: bradycardic  Conduction: 1st degree AV block  ST Segments: ST segments normal  T Waves: T waves normal  QRS axis: normal    Clinical impression: abnormal EKG            Assessment/Plan   Patient Active Problem List   Diagnosis   • Hyperlipemia   • Essential hypertension   • Coronary artery disease involving native coronary artery of native heart without angina pectoris   • Stented coronary artery   • History of adenomatous polyp of colon     Results for orders placed during the hospital encounter of 09/16/16   Adult Stress Echo With Color & Doppler    Narrative [facility] - CARDIOLOGY STRESS ECHO    PATIENT:       DAYDAY CONN  MRN:           [id number]  VISIT ID:      [id number]  CATH DR:       Rocky Mendoza*  DATE:  "         09/16/2016  ROOM:            ______________________________________________________________________________      IMPRESSION:  1. Ejection fraction of 70%.   2. No areas of ischemia or scar.                 Plan    Acceptable cardiovascular risk of planned procedure: Colonoscopy  Can proceed with surgery with usual caution and perioperative hemodynamic and cardiac rhythm monitoring.     OK to hold Plavix x 5 days prior to procedure and resume when possible when acceptable bleeding risks after procedure.     No need for Lovenox     Do not  stop Aspirin.       Keep LDL below 70 mg/dl. Monitor liver and renal functions.   Monitor CBC, CMP, TSH (as indicated) and Lipid Panel by primary     S/L NTG PRN for chest pain, call me or go to ER if has to use S/L nitroglycerin     ____________________________________________________________________________________________________________________________________________  Health maintenance and recommendations      Low salt/ HTN/ Heart healthy carbohydrate restricted cardiac diet   The patient is advised to reduce or avoid caffeine or other cardiac stimulants.     The patient was advised to avoid long term NSAIDS , use Tylenol PRN instead  Avoid cardiac stimulants including common drugs like Pseudoephedrine or excessive amounts of caffeine    Monitor for any signs of bleeding including red or dark stools. Fall precautions.        Advised staying uptodate with immunizations per established standard guidelines.      Offered to give patient  a copy      Questions were encouraged, asked and answered to the patient's  understanding and satisfaction. Questions if any regarding current medications and side effects, need for refills and importance of compliance to medications stressed.    Reviewed available prior notes, consults, prior visits, laboratory findings, radiology and cardiology relevant reports. Updated chart as applicable. I have reviewed the patient's medical history  in detail and updated the computerized patient record as relevant.      Updated patient regarding any new or relevant abnormalities on review of records or any new findings on physical exam. Mentioned to patient about purpose of visit and desirable health short and long term goals and objectives.    Primary to monitor CBC CMP Lipid panel and TSH as applicable    ___________________________________________________________________________________________________________________________________________                Return in about 1 year (around 7/8/2020).

## 2019-07-16 ENCOUNTER — HOSPITAL ENCOUNTER (OUTPATIENT)
Facility: HOSPITAL | Age: 74
Setting detail: HOSPITAL OUTPATIENT SURGERY
Discharge: HOME | End: 2019-07-16
Attending: INTERNAL MEDICINE | Admitting: INTERNAL MEDICINE

## 2019-07-16 ENCOUNTER — ANESTHESIA (OUTPATIENT)
Dept: GASTROENTEROLOGY | Facility: HOSPITAL | Age: 74
End: 2019-07-16

## 2019-07-16 ENCOUNTER — ANESTHESIA EVENT (OUTPATIENT)
Dept: GASTROENTEROLOGY | Facility: HOSPITAL | Age: 74
End: 2019-07-16

## 2019-07-16 VITALS
HEART RATE: 55 BPM | SYSTOLIC BLOOD PRESSURE: 109 MMHG | WEIGHT: 207 LBS | HEIGHT: 72 IN | BODY MASS INDEX: 28.04 KG/M2 | RESPIRATION RATE: 20 BRPM | OXYGEN SATURATION: 99 % | DIASTOLIC BLOOD PRESSURE: 60 MMHG | TEMPERATURE: 97.8 F

## 2019-07-16 DIAGNOSIS — Z86.010 HISTORY OF ADENOMATOUS POLYP OF COLON: ICD-10-CM

## 2019-07-16 PROCEDURE — 25010000002 PROPOFOL 10 MG/ML EMULSION: Performed by: NURSE ANESTHETIST, CERTIFIED REGISTERED

## 2019-07-16 PROCEDURE — G0105 COLORECTAL SCRN; HI RISK IND: HCPCS | Performed by: INTERNAL MEDICINE

## 2019-07-16 RX ORDER — SODIUM CHLORIDE 0.9 % (FLUSH) 0.9 %
3 SYRINGE (ML) INJECTION AS NEEDED
Status: DISCONTINUED | OUTPATIENT
Start: 2019-07-16 | End: 2019-07-16 | Stop reason: HOSPADM

## 2019-07-16 RX ORDER — SODIUM CHLORIDE 9 MG/ML
500 INJECTION, SOLUTION INTRAVENOUS CONTINUOUS PRN
Status: DISCONTINUED | OUTPATIENT
Start: 2019-07-16 | End: 2019-07-16 | Stop reason: HOSPADM

## 2019-07-16 RX ORDER — SODIUM CHLORIDE 0.9 % (FLUSH) 0.9 %
3 SYRINGE (ML) INJECTION EVERY 12 HOURS SCHEDULED
Status: CANCELLED | OUTPATIENT
Start: 2019-07-16

## 2019-07-16 RX ORDER — PROPOFOL 10 MG/ML
VIAL (ML) INTRAVENOUS AS NEEDED
Status: DISCONTINUED | OUTPATIENT
Start: 2019-07-16 | End: 2019-07-16 | Stop reason: SURG

## 2019-07-16 RX ORDER — SODIUM CHLORIDE 0.9 % (FLUSH) 0.9 %
3-10 SYRINGE (ML) INJECTION AS NEEDED
Status: CANCELLED | OUTPATIENT
Start: 2019-07-16

## 2019-07-16 RX ORDER — SODIUM CHLORIDE 9 MG/ML
100 INJECTION, SOLUTION INTRAVENOUS CONTINUOUS
Status: CANCELLED | OUTPATIENT
Start: 2019-07-16

## 2019-07-16 RX ORDER — LIDOCAINE HYDROCHLORIDE 20 MG/ML
INJECTION, SOLUTION INFILTRATION; PERINEURAL AS NEEDED
Status: DISCONTINUED | OUTPATIENT
Start: 2019-07-16 | End: 2019-07-16 | Stop reason: SURG

## 2019-07-16 RX ADMIN — PROPOFOL 50 MG: 10 INJECTION, EMULSION INTRAVENOUS at 09:35

## 2019-07-16 RX ADMIN — SODIUM CHLORIDE 500 ML: 9 INJECTION, SOLUTION INTRAVENOUS at 09:06

## 2019-07-16 RX ADMIN — LIDOCAINE HYDROCHLORIDE 40 MG: 20 INJECTION, SOLUTION INFILTRATION; PERINEURAL at 09:35

## 2019-07-16 RX ADMIN — PROPOFOL 50 MG: 10 INJECTION, EMULSION INTRAVENOUS at 09:37

## 2019-07-16 RX ADMIN — PROPOFOL 50 MG: 10 INJECTION, EMULSION INTRAVENOUS at 09:44

## 2019-07-16 RX ADMIN — PROPOFOL 50 MG: 10 INJECTION, EMULSION INTRAVENOUS at 09:42

## 2019-07-16 RX ADMIN — PROPOFOL 50 MG: 10 INJECTION, EMULSION INTRAVENOUS at 09:39

## 2019-07-16 NOTE — ANESTHESIA POSTPROCEDURE EVALUATION
Patient: Robin Herrera    Procedure Summary     Date:  07/16/19 Room / Location:  [facility] ENDOSCOPY 5 / [facility]    Anesthesia Start:  0932 Anesthesia Stop:  0949    Procedure:  COLONOSCOPY WITH ANESTHESIA (N/A ) Diagnosis:       History of adenomatous polyp of colon      (History of adenomatous polyp of colon [Z86.010])    Surgeon:  Shivam Nielsen DO Provider:  Santy Almonte CRNA    Anesthesia Type:  MAC ASA Status:  3          Anesthesia Type: MAC  Last vitals  BP   90/47 (07/16/19 0955)   Temp   97.8 °F (36.6 °C) (07/16/19 0844)   Pulse   57 (07/16/19 0955)   Resp   20 (07/16/19 0955)     SpO2   97 % (07/16/19 0955)     Post Anesthesia Care and Evaluation    Patient location during evaluation: PHASE II  Patient participation: complete - patient participated  Level of consciousness: awake  Pain score: 0  Pain management: adequate  Airway patency: patent  Anesthetic complications: No anesthetic complications  PONV Status: none  Cardiovascular status: acceptable  Respiratory status: acceptable  Hydration status: acceptable  No anesthesia care post op

## 2019-07-16 NOTE — H&P
[facility] Gastroenterology  Pre Procedure History & Physical    Chief Complaint:   Polyps    Subjective     HPI:   Polyps    Past Medical History:   Past Medical History:   Diagnosis Date   • AAA (abdominal aortic aneurysm) (CMS/HCC)    • Atrial fibrillation (CMS/HCC)    • CAD in native artery    • Cancer (CMS/HCC)     prostate   • Disease of thyroid gland    • Headache    • History of transfusion    • Hyperlipidemia    • Hypertension        Past Surgical History:  Past Surgical History:   Procedure Laterality Date   • CARDIAC CATHETERIZATION     • CAROTID STENT     • CAROTID STENT      Right    • COLONOSCOPY  06/04/2014    diverticulosis otherwise normal colonoscopy without evidence of recurrence of polyps   • CORONARY STENT PLACEMENT      x 1 - 2010   • EYE SURGERY Bilateral     cataracts    • PROSTATECTOMY     • TONSILLECTOMY     • TONSILLECTOMY     • VEIN SURGERY      vascular / Dr. Ty       Family History:  Family History   Problem Relation Age of Onset   • Alzheimer's disease Father    • Kidney failure Father    • Stroke Father    • Coronary artery disease Neg Hx    • Colon cancer Neg Hx    • Colon polyps Neg Hx    • Esophageal cancer Neg Hx        Social History:   reports that he has been smoking cigarettes.  He has a 25.00 pack-year smoking history. He has never used smokeless tobacco. He reports that he drinks alcohol. He reports that he does not use drugs.    Medications:   Prior to Admission medications    Medication Sig Start Date End Date Taking? Authorizing Provider   atenolol (TENORMIN) 25 MG tablet Take 1 tablet by mouth Daily. 6/9/17  Yes Rocky Mendoza MD   nicotine polacrilex (NICORETTE) 4 MG gum Chew 4 mg As Needed for smoking cessation.   Yes ProviderKalyn MD   amLODIPine (NORVASC) 10 MG tablet TAKE 1 TABLET BY MOUTH DAILY. 1/29/19   Rocky Mendoza MD   aspirin 325 MG tablet Take 325 mg by mouth daily.    ProviderKalyn MD   atorvastatin (LIPITOR) 40 MG tablet Take 1  "tablet by mouth Daily.  Patient taking differently: Take 40 mg by mouth Daily. PT TAKES 80 MG EVERY OTHER DAY 6/9/17   Rocky Mendoza MD   cholecalciferol (VITAMIN D3) 1000 UNITS tablet Take 1,000 Units by mouth daily.    Kalyn Means MD   clopidogrel (PLAVIX) 75 MG tablet TAKE 1 TABLET BY MOUTH DAILY. 2/23/19   Rocky Mendoza MD   coenzyme Q10 100 MG capsule Take 100 mg by mouth daily.    Kalyn Means MD   ferrous sulfate 325 (65 FE) MG tablet TAKE ONE TABLET BY MOUTH TWICE DAILY 10/30/17   Kalyn Means MD   levothyroxine (SYNTHROID, LEVOTHROID) 137 MCG tablet Take 137 mcg by mouth Daily.    Kalyn Means MD   lisinopril (PRINIVIL,ZESTRIL) 20 MG tablet Take 1 tablet by mouth Daily. 6/9/17   Rocky Mendoza MD   Multiple Vitamins-Minerals (MULTIVITAMIN ADULT PO) Take  by mouth.    Kalyn Means MD   nitroglycerin (NITROSTAT) 0.4 MG SL tablet Place 0.4 mg under the tongue every 5 (five) minutes as needed for chest pain. Take no more than 3 doses in 15 minutes.    Kalyn Means MD   PrednisoLONE 5 MG tablet Take  by mouth.    Kalyn Means MD       Allergies:  Pollen extract    ROS:    General: Weight stable  Resp: No SOA  Cardiovascular: No CP    Objective     Blood pressure 143/78, pulse 63, temperature 97.8 °F (36.6 °C), temperature source Temporal, resp. rate 18, height 182.9 cm (72\"), weight 93.9 kg (207 lb).    Physical Exam   Constitutional: Pt is oriented to person, place, and in no distress.   HENT: Mouth/Throat: Oropharynx is clear.   Cardiovascular: Normal rate, regular rhythm.    Pulmonary/Chest: Effort normal. No respiratory distress. No  wheezes.   Abdominal: Soft. Non-distended.  Skin: Skin is warm and dry.   Psychiatric: Mood, memory, affect and judgment appear normal.     Assessment/Plan     Diagnosis:  Polyps    Anticipated Surgical Procedure:  C-scope    The risks, benefits, and alternatives of this procedure have been discussed with the patient " or the responsible party- the patient understands and agrees to proceed.

## 2019-07-16 NOTE — ANESTHESIA PREPROCEDURE EVALUATION
Anesthesia Evaluation     no history of anesthetic complications:  NPO Solid Status: > 8 hours  NPO Liquid Status: > 4 hours           Airway   Mallampati: I  TM distance: >3 FB  Neck ROM: full  Dental          Pulmonary - normal exam    breath sounds clear to auscultation  (+) a smoker (0.5 ppd) Current Smoked day of surgery,   (-) asthma, recent URI, sleep apnea  Cardiovascular - normal exam  Exercise tolerance: good (4-7 METS)    Rhythm: regular  Rate: normal    (+) hypertension, CAD, cardiac stents (8 yrs ago) dysrhythmias (questionable, none found on Holter monitor) Atrial Fib, hyperlipidemia,  carotid artery disease (Bilateral CEA's, 3 yrs ago) carotid bilateral  (-) pacemaker, past MI, angina, CABG      Neuro/Psych  (-) seizures, TIA, CVA  GI/Hepatic/Renal/Endo    (+)   hypothyroidism,   (-) GERD, liver disease, no renal disease, diabetes, hyperthyroidism    Musculoskeletal     Abdominal    Substance History      OB/GYN          Other                      Anesthesia Plan    ASA 3     MAC     intravenous induction   Anesthetic plan, all risks, benefits, and alternatives have been provided, discussed and informed consent has been obtained with: patient.

## 2019-08-01 ENCOUNTER — TELEPHONE (OUTPATIENT)
Dept: GASTROENTEROLOGY | Facility: CLINIC | Age: 74
End: 2019-08-01

## 2019-08-30 ENCOUNTER — TRANSCRIBE ORDERS (OUTPATIENT)
Dept: ADMINISTRATIVE | Facility: HOSPITAL | Age: 74
End: 2019-08-30

## 2019-08-30 DIAGNOSIS — R93.89 ABNORMAL FINDINGS ON IMAGING TEST: ICD-10-CM

## 2019-08-30 DIAGNOSIS — C61 PROSTATE CA (HCC): Primary | ICD-10-CM

## 2019-09-03 RX ORDER — CLOPIDOGREL BISULFATE 75 MG/1
75 TABLET ORAL DAILY
Qty: 30 TABLET | Refills: 12 | Status: SHIPPED | OUTPATIENT
Start: 2019-09-03 | End: 2020-09-14 | Stop reason: SDUPTHER

## 2019-09-06 ENCOUNTER — APPOINTMENT (OUTPATIENT)
Dept: CT IMAGING | Facility: HOSPITAL | Age: 74
End: 2019-09-06

## 2019-09-06 ENCOUNTER — HOSPITAL ENCOUNTER (OUTPATIENT)
Dept: CT IMAGING | Facility: HOSPITAL | Age: 74
Discharge: HOME | End: 2019-09-06
Admitting: PHYSICIAN ASSISTANT

## 2019-09-06 ENCOUNTER — HOSPITAL ENCOUNTER (OUTPATIENT)
Dept: CT IMAGING | Facility: HOSPITAL | Age: 74
Discharge: HOME | End: 2019-09-06

## 2019-09-06 DIAGNOSIS — C61 PROSTATE CA (HCC): ICD-10-CM

## 2019-09-06 DIAGNOSIS — R93.89 ABNORMAL FINDINGS ON IMAGING TEST: ICD-10-CM

## 2019-09-06 PROCEDURE — 78815 PET IMAGE W/CT SKULL-THIGH: CPT

## 2019-09-06 PROCEDURE — 0 FLUDEOXYGLUCOSE F18 SOLUTION: Performed by: PHYSICIAN ASSISTANT

## 2019-09-06 PROCEDURE — A9552 F18 FDG: HCPCS | Performed by: PHYSICIAN ASSISTANT

## 2019-09-06 RX ADMIN — FLUDEOXYGLUCOSE F18 1 DOSE: 300 INJECTION INTRAVENOUS at 08:53

## 2019-09-24 ENCOUNTER — HOSPITAL ENCOUNTER (OUTPATIENT)
Dept: VASCULAR LAB | Age: 74
Discharge: HOME | End: 2019-09-24
Payer: MEDICARE

## 2019-09-24 ENCOUNTER — OFFICE VISIT (OUTPATIENT)
Dept: VASCULAR SURGERY | Age: 74
End: 2019-09-24
Payer: MEDICARE

## 2019-09-24 VITALS
TEMPERATURE: 97.8 F | HEIGHT: 72 IN | RESPIRATION RATE: 18 BRPM | DIASTOLIC BLOOD PRESSURE: 79 MMHG | BODY MASS INDEX: 29.12 KG/M2 | HEART RATE: 67 BPM | WEIGHT: 215 LBS | SYSTOLIC BLOOD PRESSURE: 128 MMHG

## 2019-09-24 DIAGNOSIS — I70.213 ATHEROSCLEROSIS OF NATIVE ARTERY OF BOTH LOWER EXTREMITIES WITH INTERMITTENT CLAUDICATION (HCC): ICD-10-CM

## 2019-09-24 DIAGNOSIS — I71.40 AAA (ABDOMINAL AORTIC ANEURYSM) WITHOUT RUPTURE: Primary | ICD-10-CM

## 2019-09-24 DIAGNOSIS — I73.9 PVD (PERIPHERAL VASCULAR DISEASE) (HCC): ICD-10-CM

## 2019-09-24 DIAGNOSIS — I65.23 BILATERAL CAROTID ARTERY STENOSIS: ICD-10-CM

## 2019-09-24 PROCEDURE — G8427 DOCREV CUR MEDS BY ELIG CLIN: HCPCS | Performed by: NURSE PRACTITIONER

## 2019-09-24 PROCEDURE — 99212 OFFICE O/P EST SF 10 MIN: CPT | Performed by: NURSE PRACTITIONER

## 2019-09-24 PROCEDURE — 93923 UPR/LXTR ART STDY 3+ LVLS: CPT

## 2019-09-24 PROCEDURE — G8417 CALC BMI ABV UP PARAM F/U: HCPCS | Performed by: NURSE PRACTITIONER

## 2019-09-24 PROCEDURE — 4040F PNEUMOC VAC/ADMIN/RCVD: CPT | Performed by: NURSE PRACTITIONER

## 2019-09-24 PROCEDURE — 4004F PT TOBACCO SCREEN RCVD TLK: CPT | Performed by: NURSE PRACTITIONER

## 2019-09-24 PROCEDURE — 3017F COLORECTAL CA SCREEN DOC REV: CPT | Performed by: NURSE PRACTITIONER

## 2019-09-24 PROCEDURE — 1123F ACP DISCUSS/DSCN MKR DOCD: CPT | Performed by: NURSE PRACTITIONER

## 2019-09-24 PROCEDURE — G8598 ASA/ANTIPLAT THER USED: HCPCS | Performed by: NURSE PRACTITIONER

## 2019-09-24 NOTE — PROGRESS NOTES
fever or chills. No diaphoresis or significant fatigue. HENT - no significant rhinorrhea or epistaxis. No tinnitus or significant hearing loss. Eyes - no sudden vision change or amaurosis. Respiratory - no significant shortness of breath, wheezing, or stridor. No apnea, cough, or chest tightness associated with shortness of breath. Cardiovascular - no chest pain, syncope, or significant dizziness. No palpitations or significant leg swelling. Patient reports has claudication. Gastrointestinal - no abdominal swelling or pain. No blood in stool. No severe constipation, diarrhea, nausea, or vomiting. Genitourinary - No difficulty urinating, dysuria, frequency, or urgency. No flank pain or hematuria. Musculoskeletal - no back pain, gait disturbance, or myalgia. Skin - no color change, rash, pallor, or new wound. Neurologic - no dizziness, facial asymmetry, or light headedness. No seizures. No speech difficulty or lateralizing weakness. Hematologic - no easy bruising or excessive bleeding. Psychiatric - no severe anxiety or nervousness. No confusion. All other review of systems are negative. Physical Exam    /79 (Site: Right Upper Arm, Position: Sitting, Cuff Size: Medium Adult)   Pulse 67   Temp 97.8 °F (36.6 °C) (Temporal)   Resp 18   Ht 6' (1.829 m)   Wt 215 lb (97.5 kg)   BMI 29.16 kg/m²     Constitutional - well developed, well nourished. No diaphoresis or acute distress. HENT - head normocephalic. Right external ear canal appears normal.  Left external ear canal appears normal.  Septum appears midline. Eyes - conjunctiva normal.  EOMS normal.  No exudate. No icterus. Neck- ROM appears normal, no tracheal deviation. Cardiovascular - Regular rate and rhythm. Heart sounds are normal.  No murmur, rub, or gallop. Carotid pulses are 2+ to palpation bilaterally without bruit. Extremities - Radial and brachial pulses are 2+ to palpation bilaterally.   Right femoral pulse:

## 2019-10-25 RX ORDER — AMLODIPINE BESYLATE 10 MG/1
10 TABLET ORAL DAILY
Qty: 90 TABLET | Refills: 2 | Status: SHIPPED | OUTPATIENT
Start: 2019-10-25 | End: 2020-07-21

## 2019-12-09 DIAGNOSIS — C61 MALIGNANT NEOPLASM OF PROSTATE (HCC): ICD-10-CM

## 2019-12-09 LAB — PROSTATE SPECIFIC ANTIGEN: 0.03 NG/ML (ref 0–4)

## 2019-12-16 ENCOUNTER — OFFICE VISIT (OUTPATIENT)
Dept: UROLOGY | Age: 74
End: 2019-12-16
Payer: MEDICARE

## 2019-12-16 VITALS — WEIGHT: 220 LBS | TEMPERATURE: 98.4 F | HEIGHT: 72 IN | BODY MASS INDEX: 29.8 KG/M2

## 2019-12-16 DIAGNOSIS — C61 MALIGNANT NEOPLASM OF PROSTATE (HCC): Primary | ICD-10-CM

## 2019-12-16 LAB
BILIRUBIN, POC: NORMAL
BLOOD URINE, POC: NORMAL
CLARITY, POC: CLEAR
COLOR, POC: YELLOW
GLUCOSE URINE, POC: NORMAL
KETONES, POC: NORMAL
LEUKOCYTE EST, POC: NORMAL
NITRITE, POC: NORMAL
PH, POC: 5.5
PROTEIN, POC: NORMAL
SPECIFIC GRAVITY, POC: 1.02
UROBILINOGEN, POC: 0.2

## 2019-12-16 PROCEDURE — G8417 CALC BMI ABV UP PARAM F/U: HCPCS | Performed by: UROLOGY

## 2019-12-16 PROCEDURE — 1123F ACP DISCUSS/DSCN MKR DOCD: CPT | Performed by: UROLOGY

## 2019-12-16 PROCEDURE — 3017F COLORECTAL CA SCREEN DOC REV: CPT | Performed by: UROLOGY

## 2019-12-16 PROCEDURE — G8598 ASA/ANTIPLAT THER USED: HCPCS | Performed by: UROLOGY

## 2019-12-16 PROCEDURE — 4040F PNEUMOC VAC/ADMIN/RCVD: CPT | Performed by: UROLOGY

## 2019-12-16 PROCEDURE — G8427 DOCREV CUR MEDS BY ELIG CLIN: HCPCS | Performed by: UROLOGY

## 2019-12-16 PROCEDURE — 4004F PT TOBACCO SCREEN RCVD TLK: CPT | Performed by: UROLOGY

## 2019-12-16 PROCEDURE — 81003 URINALYSIS AUTO W/O SCOPE: CPT | Performed by: UROLOGY

## 2019-12-16 PROCEDURE — 99213 OFFICE O/P EST LOW 20 MIN: CPT | Performed by: UROLOGY

## 2019-12-16 PROCEDURE — G8484 FLU IMMUNIZE NO ADMIN: HCPCS | Performed by: UROLOGY

## 2019-12-16 RX ORDER — AMLODIPINE BESYLATE 10 MG/1
TABLET ORAL
Refills: 2 | COMMUNITY
Start: 2019-10-25

## 2019-12-16 RX ORDER — FERROUS SULFATE 325(65) MG
TABLET ORAL
COMMUNITY
Start: 2017-10-30 | End: 2022-02-18

## 2019-12-16 ASSESSMENT — ENCOUNTER SYMPTOMS
CONSTIPATION: 0
WHEEZING: 0
EYE REDNESS: 0
COUGH: 0
ABDOMINAL PAIN: 0
DIARRHEA: 0
BACK PAIN: 0
EYE DISCHARGE: 0
SHORTNESS OF BREATH: 0

## 2020-04-21 ENCOUNTER — VIRTUAL VISIT (OUTPATIENT)
Dept: VASCULAR SURGERY | Age: 75
End: 2020-04-21
Payer: MEDICARE

## 2020-04-21 PROCEDURE — 99442 PR PHYS/QHP TELEPHONE EVALUATION 11-20 MIN: CPT | Performed by: NURSE PRACTITIONER

## 2020-04-21 NOTE — PROGRESS NOTES
Marshal Dietz is a 69 y.o. male evaluated via telephone on 4/21/2020. Consent:  He and/or health care decision maker is aware that that he may receive a bill for this telephone service, depending on his insurance coverage, and has provided verbal consent to proceed: Yes    Patient is located at home  Provider is located at [facility]   Also present during call is wife    Allegra Bach has a history of peripheral vascular disease of the lower extremities. He has had this for 1 - 5 years. Current treatment includes ASA EC daily. Allegra Bach has not had new wounds. Recently, he reports claudication at a distance of  1/2 miles. Allegra Bach reports that the left leg is more significant than the right. He reports claudication is not changed and is mostly in the form of crampy type pain starting in the hips. He has a short recovery time. This is reproducible in nature. He reports ischemic rest pain 0 times per night. He reports walking with cart does not help. He presents for follow up of carotid artery stenosis. He has a known history of carotid artery stenosis for 1 - 5 years. His current treatment includes clopidogrel 75 mg po qd, ASA EC daily. He denies a history of CVA. He reports no TIA's, episodes of lateralizing weakness and episodes of amaurosis fugax. He has a known history of abdominal aortic aneurysm for 1 - 5 years. He has not had abdominal pain. He has back pain in the lumbar spine and sacral spine region. This pain is unchanged since the last visit. Pain is rated as 4.            Marshal Dietz is a 69 y.o. male with the following history reviewed and recorded in [facility]:  Patient Active Problem List    Diagnosis Date Noted    Memory loss 05/02/2017    Abnormal brain scan 05/02/2017    Carotid disease, bilateral ([facility] [address] 05/02/2017    Paroxysmal atrial fibrillation ([facility] [address] 04/15/2017    Coronary artery disease involving native coronary artery of native heart without angina pectoris      sees dr. Janice Grfifiths Hyperlipidemia     Hyperthyroidism     Right BBB/left ant fasc block      per dr. Jeronimo Jane; awaiting ekg from [facility]/office.  Essential hypertension     Sleep apnea      cpap used      AAA (abdominal aortic aneurysm) without rupture (HCC)     Atherosclerosis of native artery of both lower extremities with intermittent claudication (HCC)     Bilateral carotid artery stenosis     Malignant neoplasm of prostate ([facility] [place] [facility] 10/05/2016    Presence of stent in coronary artery 09/27/2016     Current Outpatient Medications   Medication Sig Dispense Refill    amLODIPine (NORVASC) 10 MG tablet   2    clopidogrel (PLAVIX) 75 MG tablet Take 1 tablet by mouth daily 30 tablet 0    Multiple Vitamins-Minerals (MULTI FOR HIM 50+ PO) Take 1 tablet by mouth daily       levothyroxine (SYNTHROID) 125 MCG tablet Take 137 mcg by mouth Daily       aspirin 325 MG tablet Take 325 mg by mouth daily       atenolol (TENORMIN) 50 MG tablet Take 25 mg by mouth daily Indications: High Blood Pressure       atorvastatin (LIPITOR) 20 MG tablet Take 40 mg by mouth nightly Indications: Changes in Cholesterol       Cholecalciferol (VITAMIN D3) 1000 UNITS TABS Take 1,000 Units by mouth daily       coenzyme Q10 100 MG CAPS capsule Take 100 mg by mouth daily       lisinopril (PRINIVIL;ZESTRIL) 20 MG tablet Take 20 mg by mouth daily       nitroGLYCERIN (NITROSTAT) 0.4 MG SL tablet Place 0.4 mg under the tongue every 5 minutes as needed       ferrous sulfate 325 (65 Fe) MG tablet TAKE ONE TABLET BY MOUTH TWICE DAILY      nicotine polacrilex (NICORETTE) 4 MG gum 4 mg       No current facility-administered medications for this visit.       Allergies: Pollen extract  Past Medical History:   Diagnosis Date    AAA (abdominal aortic aneurysm) without rupture (HCC)     Arthritis     Atheroscler of native artery of both legs with intermit claudication ([facility] [place] [facility]     BPH without obstruction/lower urinary tract symptoms     CAD in native artery puncture site.  WRIST FRACTURE SURGERY Right      Family History   Problem Relation Age of Onset    Kidney Disease Father     Dementia Father         alzheimers    Ovarian Cancer Mother     Breast Cancer Sister      Social History     Tobacco Use    Smoking status: Light Tobacco Smoker     Packs/day: 0.25     Years: 50.00     Pack years: 12.50     Last attempt to quit: 4/10/2017     Years since quitting: 3.0    Smokeless tobacco: Never Used    Tobacco comment: Pt is trying to quit   Substance Use Topics    Alcohol use: Yes     Alcohol/week: 2.0 standard drinks     Types: 2 Cans of beer per week     Comment: 2 beers in the evening         Review of Systems    Constitutional - no significant activity change, appetite change, or unexpected weight change. No fever or chills. No diaphoresis or significant fatigue. HENT - no significant rhinorrhea or epistaxis. No tinnitus or significant hearing loss. Eyes - no sudden vision change or amaurosis. Respiratory - no significant shortness of breath, wheezing, or stridor. No apnea, cough, or chest tightness associated with shortness of breath. Cardiovascular - no chest pain, syncope, or significant dizziness. No palpitations has left leg swelling on occasion. Is normal when gets up in the morning. has claudication. Gastrointestinal - has not had abdominal swelling or pain. No blood in stool. No severe constipation, diarrhea, nausea, or vomiting. Genitourinary - No difficulty urinating, dysuria, frequency, or urgency. No flank pain or hematuria. Musculoskeletal - has had back pain, gait disturbance, or myalgia. Skin - no color change, rash, pallor, or new wound. Neurologic - no dizziness, facial asymmetry, or light headedness. No seizures. No speech difficulty or lateralizing weakness. Hematologic - no easy bruising or excessive bleeding. Psychiatric - no severe anxiety or nervousness. No confusion.   All other review of systems are negative. PHYSICAL EXAMINATION:    [ INSTRUCTIONS:  \"[x]\" Indicates a positive item  \"[]\" Indicates a negative item  -- DELETE ALL ITEMS NOT EXAMINED]    [x] Alert  [x] Oriented to person/place/time    [x] No apparent distress  [] Toxic appearing  [x] Normal Mood  [] Anxious appearing    [] Depressed appearing  [] Confused appearing      [] Poor short term memory  [] Poor long term memory   Memory appears to be intact     Aortic u/s - 4.4 cm      Assessment    1. AAA (abdominal aortic aneurysm) without rupture ([address]    2. Atherosclerosis of native artery of both lower extremities with intermittent claudication ([address]    3. Bilateral carotid artery stenosis          Plan    Asa and plavix daily  Strongly encouraged start/continue statin therapy  Recommended no smoking  Will hold off on carotid and nora until pandemic clears  Patient instructed to walk as much as possible. Call our office with any progressive pain in leg(s) or hip(s) with walking. Take good care of your feet. Let us know right away if you develop a wound on your foot. Patient instructed to call or proceed to the emergency room with any symptoms of lateralizing weakness, loss of vision in one eye, or episodes slurred speech. Documentation:  I communicated with the patient and/or health care decision maker about cvd, pvd and aaa. Details of this discussion including any medical advice provided: as above      I affirm this is a Patient Initiated Episode with an Established Patient who has not had a related appointment within my department in the past 7 days or scheduled within the next 24 hours.     Total Time: minutes: 11-20 minutes    Note: not billable if this call serves to triage the patient into an appointment for the relevant concern      Satya Mojica

## 2020-06-10 ENCOUNTER — HOSPITAL ENCOUNTER (OUTPATIENT)
Dept: ULTRASOUND IMAGING | Age: 75
Discharge: HOME | End: 2020-06-10
Payer: MEDICARE

## 2020-06-10 PROCEDURE — 93978 VASCULAR STUDY: CPT

## 2020-06-11 ENCOUNTER — VIRTUAL VISIT (OUTPATIENT)
Dept: VASCULAR SURGERY | Age: 75
End: 2020-06-11
Payer: MEDICARE

## 2020-06-11 PROCEDURE — 99442 PR PHYS/QHP TELEPHONE EVALUATION 11-20 MIN: CPT | Performed by: NURSE PRACTITIONER

## 2020-06-11 NOTE — PROGRESS NOTES
\"[x]\" Indicates a positive item  \"[]\" Indicates a negative item  -- DELETE ALL ITEMS NOT EXAMINED]    [x] Alert  [x] Oriented to person/place/time    [x] No apparent distress  [] Toxic appearing  [x] Normal Mood  [] Anxious appearing    [] Depressed appearing  [] Confused appearing      [] Poor short term memory  [] Poor long term memory   Memory appears to be intact     Aortic ultrasound:  4.6 cm abdominal aortic aneurysm. This aneurysm has increased since the last visit. Individual films reviewed:  Yes. These results have been reviewed with the patient. Assessment    1. AAA (abdominal aortic aneurysm) without rupture ([facility] [address]          Plan      Strongly encouraged start/continue statin therapy  Recommended no smoking  Needs cta aorta when he gets carotid u/s and nora  Documentation:  I communicated with the patient and/or health care decision maker about cvd, pvd, aaa. Details of this discussion including any medical advice provided: as above  Patient instructed to walk as much as possible. Call our office with any progressive pain in leg(s) or hip(s) with walking. Take good care of your feet. Let us know right away if you develop a wound on your foot. Patient instructed to call or proceed to the emergency room with any symptoms of lateralizing weakness, loss of vision in one eye, or episodes slurred speech. Symptoms of rupture reviewed with the patient including sudden onset severe back pain or abdominal pain. This pain can sometimes radiate into the groin or leg. The patient may experience a feeling of impending doom or death. If this occurs they have been insturcted to call 911 and get to the emergency room telling them you have an aneurysm. Patient has voiced understanding. I affirm this is a Patient Initiated Episode with an Established Patient who has not had a related appointment within my department in the past 7 days or scheduled within the next 24 hours.     Total Time: minutes: 11-20 minutes    Note: not billable if this call serves to triage the patient into an appointment for the relevant concern      [facility]

## 2020-06-16 DIAGNOSIS — C61 MALIGNANT NEOPLASM OF PROSTATE (HCC): ICD-10-CM

## 2020-06-16 LAB — PROSTATE SPECIFIC ANTIGEN: 0.04 NG/ML (ref 0–4)

## 2020-06-22 ENCOUNTER — OFFICE VISIT (OUTPATIENT)
Dept: UROLOGY | Age: 75
End: 2020-06-22
Payer: MEDICARE

## 2020-06-22 VITALS — BODY MASS INDEX: 30.34 KG/M2 | WEIGHT: 224 LBS | HEIGHT: 72 IN | TEMPERATURE: 97.2 F

## 2020-06-22 LAB
BILIRUBIN, POC: NORMAL
BLOOD URINE, POC: NORMAL
CLARITY, POC: CLEAR
COLOR, POC: YELLOW
GLUCOSE URINE, POC: NORMAL
KETONES, POC: NORMAL
LEUKOCYTE EST, POC: NORMAL
NITRITE, POC: NORMAL
PH, POC: 6
PROTEIN, POC: NORMAL
SPECIFIC GRAVITY, POC: 1.01
UROBILINOGEN, POC: 0.2

## 2020-06-22 PROCEDURE — 3017F COLORECTAL CA SCREEN DOC REV: CPT | Performed by: UROLOGY

## 2020-06-22 PROCEDURE — 4040F PNEUMOC VAC/ADMIN/RCVD: CPT | Performed by: UROLOGY

## 2020-06-22 PROCEDURE — 4004F PT TOBACCO SCREEN RCVD TLK: CPT | Performed by: UROLOGY

## 2020-06-22 PROCEDURE — G8427 DOCREV CUR MEDS BY ELIG CLIN: HCPCS | Performed by: UROLOGY

## 2020-06-22 PROCEDURE — 1123F ACP DISCUSS/DSCN MKR DOCD: CPT | Performed by: UROLOGY

## 2020-06-22 PROCEDURE — 99213 OFFICE O/P EST LOW 20 MIN: CPT | Performed by: UROLOGY

## 2020-06-22 PROCEDURE — G8417 CALC BMI ABV UP PARAM F/U: HCPCS | Performed by: UROLOGY

## 2020-06-22 PROCEDURE — 81003 URINALYSIS AUTO W/O SCOPE: CPT | Performed by: UROLOGY

## 2020-06-22 ASSESSMENT — ENCOUNTER SYMPTOMS
SINUS PRESSURE: 0
NAUSEA: 0
BLOOD IN STOOL: 0
DIARRHEA: 0
RHINORRHEA: 0
VOMITING: 0
COUGH: 0
WHEEZING: 0
COLOR CHANGE: 0
EYE PAIN: 0
FACIAL SWELLING: 0
EYE REDNESS: 0
EYE DISCHARGE: 0
SORE THROAT: 0
CONSTIPATION: 0
SHORTNESS OF BREATH: 0
ABDOMINAL PAIN: 0
BACK PAIN: 0
ABDOMINAL DISTENTION: 0

## 2020-06-22 NOTE — PROGRESS NOTES
Ramona Cushing is a 69 y.o. male who presents today   Chief Complaint   Patient presents with    Follow-up     patient here following up on prostate cancer with PSA lab drawn prior    Prostate Cancer     Prostate Cancer  Patient is here today for prostate cancer which was first diagnosed 8 years ago. His prostate cancer can be characterized as early stage organ confined. Pathologic stage T2b, [place] 3+4 = 7 PSA was undetectable until 2016 then he has a very slight biochemical detection on PSA assay but this has remained relatively stable over the last 2 years  His last several PSA values are as follows:  Lab Results   Component Value Date    PSA 0.04 06/16/2020    PSA 0.03 12/09/2019    PSA 0.03 06/04/2019     Previous treatment of prostate cancer: Radical prostatectomy done April 9, 2012  Lower urinary tract symptoms: urine incontinence:  stress  He also has ED he is desired no treatment for this he has no other symptoms no bone pain no weight loss      Past Medical History:   Diagnosis Date    AAA (abdominal aortic aneurysm) without rupture (HCC)     Arthritis     Atheroscler of native artery of both legs with intermit claudication ([address]     BPH without obstruction/lower urinary tract symptoms     CAD in native artery     sees dr. Lenora Perry Carotid artery stenosis     History of blood transfusion     Hyperlipidemia     Hypertension     Hyperthyroidism     Iron deficiency     Malignant neoplasm prostate ([address]     surgery    Right BBB/left ant fasc block     per dr. Gaxiola Blow; awaiting ekg from [facility]office.  Sleep apnea     cpap used    TIA (transient ischemic attack)        Past Surgical History:   Procedure Laterality Date    CAROTID ENDARTERECTOMY Left 4/12/2017    [facility]. Left carotid endarterectomy with bovine pericardial catch. Left cervical carotid arteriograms.     CATARACT REMOVAL Bilateral     CORONARY ANGIOPLASTY WITH STENT PLACEMENT  2010    dr. Nereyda Block Left 8/16/2017 atorvastatin (LIPITOR) 20 MG tablet Take 40 mg by mouth nightly Indications: Changes in Cholesterol       Cholecalciferol (VITAMIN D3) 1000 UNITS TABS Take 1,000 Units by mouth daily       coenzyme Q10 100 MG CAPS capsule Take 100 mg by mouth daily       lisinopril (PRINIVIL;ZESTRIL) 20 MG tablet Take 20 mg by mouth daily       nitroGLYCERIN (NITROSTAT) 0.4 MG SL tablet Place 0.4 mg under the tongue every 5 minutes as needed        No current facility-administered medications for this visit. Allergies   Allergen Reactions    Pollen Extract        Social History     Socioeconomic History    Marital status:      Spouse name: None    Number of children: None    Years of education: None    Highest education level: None   Occupational History    None   Social Needs    Financial resource strain: None    Food insecurity     Worry: None     Inability: None    Transportation needs     Medical: None     Non-medical: None   Tobacco Use    Smoking status: Light Tobacco Smoker     Packs/day: 0.25     Years: 50.00     Pack years: 12.50     Last attempt to quit: 4/10/2017     Years since quitting: 3.2    Smokeless tobacco: Never Used    Tobacco comment: Pt is trying to quit   Substance and Sexual Activity    Alcohol use:  Yes     Alcohol/week: 2.0 standard drinks     Types: 2 Cans of beer per week     Comment: 2 beers in the evening    Drug use: No    Sexual activity: None   Lifestyle    Physical activity     Days per week: None     Minutes per session: None    Stress: None   Relationships    Social connections     Talks on phone: None     Gets together: None     Attends [employer] service: None     Active member of club or organization: None     Attends meetings of clubs or organizations: None     Relationship status: None    Intimate partner violence     Fear of current or ex partner: None     Emotionally abused: None     Physically abused: None     Forced sexual activity: None   Other Topics Concern    None   Social History Narrative    None       Family History   Problem Relation Age of Onset    Kidney Disease Father     Dementia Father         alzheimers    Ovarian Cancer Mother     Breast Cancer Sister        REVIEW OF SYSTEMS:  Review of Systems   Constitutional: Negative for activity change, chills, fatigue and fever. HENT: Negative for congestion, ear discharge, ear pain, facial swelling, mouth sores, rhinorrhea, sinus pressure and sore throat. Eyes: Negative for pain, discharge and redness. Respiratory: Negative for cough, shortness of breath and wheezing. Cardiovascular: Negative for chest pain, palpitations and leg swelling. Gastrointestinal: Negative for abdominal distention, abdominal pain, blood in stool, constipation, diarrhea, nausea and vomiting. Endocrine: Negative for polydipsia, polyphagia and polyuria. Genitourinary: Negative for decreased urine volume, difficulty urinating, dysuria, enuresis, flank pain, frequency, genital sores, hematuria and urgency. Musculoskeletal: Negative for back pain, gait problem, joint swelling, neck pain and neck stiffness. Skin: Negative for color change, rash and wound. Allergic/Immunologic: Negative for environmental allergies and immunocompromised state. Neurological: Negative for dizziness, syncope, weakness, light-headedness, numbness and headaches. Hematological: Bruises/bleeds easily. Psychiatric/Behavioral: Negative for agitation, confusion, dysphoric mood, self-injury, sleep disturbance and suicidal ideas. The patient is not hyperactive. PHYSICAL EXAM:  Temp 97.2 °F (36.2 °C) (Temporal)   Ht 6' (1.829 m)   Wt 224 lb (101.6 kg)   BMI 30.38 kg/m²   Physical Exam  Constitutional:       General: He is not in acute distress. Appearance: Normal appearance. He is well-developed. HENT:      Head: Normocephalic and atraumatic. Nose: Nose normal.   Eyes:      General: No scleral icterus. still below threshold of 0.2 I would continue just to watch this. - POCT Urinalysis No Micro (Auto)  - PSA, Diagnostic; Future      Orders Placed This Encounter   Procedures    PSA, Diagnostic     PSA in 6 month     Standing Status:   Future     Standing Expiration Date:   6/22/2021    POCT Urinalysis No Micro (Auto)        Return in about 6 months (around 12/22/2020) for PSA prior to vext visit. All information inputted into the note by the MA to include chief complaint, past medical history, past surgical history, medications, allergies, social and family history and review of systems has been reviewed and updated as needed by me. EMR Dragon/transcription disclaimer: Much of this documentt is electronic  transcription/translation of spoken language to printed text. The  electronic translation of spoken language may be erroneous, or at times,  nonsensical words or phrases may be inadvertently transcribed.  Although I  have reviewed the document for such errors, some may still exist.

## 2020-07-08 ENCOUNTER — OFFICE VISIT (OUTPATIENT)
Dept: CARDIOLOGY | Facility: CLINIC | Age: 75
End: 2020-07-08

## 2020-07-08 VITALS
SYSTOLIC BLOOD PRESSURE: 158 MMHG | OXYGEN SATURATION: 95 % | WEIGHT: 222 LBS | BODY MASS INDEX: 30.07 KG/M2 | HEART RATE: 64 BPM | HEIGHT: 72 IN | DIASTOLIC BLOOD PRESSURE: 80 MMHG

## 2020-07-08 DIAGNOSIS — I10 ESSENTIAL HYPERTENSION: ICD-10-CM

## 2020-07-08 DIAGNOSIS — Z95.5 STENTED CORONARY ARTERY: Primary | ICD-10-CM

## 2020-07-08 DIAGNOSIS — R94.31 ABNORMAL ECG: ICD-10-CM

## 2020-07-08 DIAGNOSIS — I25.10 CORONARY ARTERY DISEASE INVOLVING NATIVE CORONARY ARTERY OF NATIVE HEART WITHOUT ANGINA PECTORIS: ICD-10-CM

## 2020-07-08 DIAGNOSIS — Z86.010 HISTORY OF ADENOMATOUS POLYP OF COLON: ICD-10-CM

## 2020-07-08 PROCEDURE — 99214 OFFICE O/P EST MOD 30 MIN: CPT | Performed by: INTERNAL MEDICINE

## 2020-07-08 PROCEDURE — 93000 ELECTROCARDIOGRAM COMPLETE: CPT | Performed by: INTERNAL MEDICINE

## 2020-07-08 NOTE — PROGRESS NOTES
Robin Herrera  [id number]  1945  74 y.o.  male    Referring Provider: Wilber Rivera Jr., MD    Reason for Follow-up Visit:  Routine follow up.   preoperative cardiovascular clearance for abdominal aortic aneurysm   SVT      Subjective    Overall feeling well   No chest pain or shortness of breath     No palpitations  No significant pedal edema    Compliant with medications and dietary advice  Good effort tolerance    No presyncope or syncope  Compliant with medications      No new events or complaints since last visit   Says labs OK and checked by Dr Rivera  Can easily climb two flights of stairs    BP well controlled at home and Dr Rivera's office    Effort tolerance limited more by orthopedic rather than cardiac related issues, therefore difficult to assess functional capacity.     Abnormal ECG           History of present illness:  Robin Herrera is a 74 y.o. yo male with history of carotid stenosis who presents today for   Chief Complaint   Patient presents with   • Coronary Artery Disease     1 year follow up    • Edema     Normal in the morning and  thought out the day they swell    .    History  Past Medical History:   Diagnosis Date   • AAA (abdominal aortic aneurysm) (CMS/HCC)    • Atrial fibrillation (CMS/HCC)    • CAD in native artery    • Cancer (CMS/HCC)     prostate   • Disease of thyroid gland    • Headache    • History of transfusion    • Hyperlipidemia    • Hypertension    ,   Past Surgical History:   Procedure Laterality Date   • CARDIAC CATHETERIZATION     • CAROTID STENT     • CAROTID STENT      Right    • COLONOSCOPY  06/04/2014    diverticulosis otherwise normal colonoscopy without evidence of recurrence of polyps   • COLONOSCOPY N/A 7/16/2019    Procedure: COLONOSCOPY WITH ANESTHESIA;  Surgeon: Shivam Nielsen DO;  Location: [facility] ENDOSCOPY;  Service: Gastroenterology   • CORONARY STENT PLACEMENT      x 1 - 2010   • EYE SURGERY Bilateral     cataracts    • PROSTATECTOMY     •  TONSILLECTOMY     • TONSILLECTOMY     • VEIN SURGERY      vascular / Dr. Ty   ,   Family History   Problem Relation Age of Onset   • Alzheimer's disease Father    • Kidney failure Father    • Stroke Father    • Coronary artery disease Neg Hx    • Colon cancer Neg Hx    • Colon polyps Neg Hx    • Esophageal cancer Neg Hx    ,   Social History     Tobacco Use   • Smoking status: Current Every Day Smoker     Packs/day: 0.50     Years: 50.00     Pack years: 25.00     Types: Cigarettes   • Smokeless tobacco: Never Used   Substance Use Topics   • Alcohol use: Yes     Comment: daily   • Drug use: No   ,     Medications  Current Outpatient Medications   Medication Sig Dispense Refill   • amLODIPine (NORVASC) 10 MG tablet TAKE 1 TABLET BY MOUTH DAILY. 90 tablet 2   • aspirin 325 MG tablet Take 325 mg by mouth daily.     • atenolol (TENORMIN) 25 MG tablet Take 1 tablet by mouth Daily. 30 tablet 11   • atorvastatin (LIPITOR) 40 MG tablet Take 1 tablet by mouth Daily. (Patient taking differently: Take 40 mg by mouth Daily. PT TAKES 80 MG EVERY OTHER DAY) 30 tablet 11   • cholecalciferol (VITAMIN D3) 1000 UNITS tablet Take 1,000 Units by mouth daily.     • clopidogrel (PLAVIX) 75 MG tablet TAKE 1 TABLET BY MOUTH DAILY. 30 tablet 12   • coenzyme Q10 100 MG capsule Take 100 mg by mouth daily.     • ferrous sulfate 325 (65 FE) MG tablet TAKE ONE TABLET BY MOUTH TWICE DAILY     • levothyroxine (SYNTHROID, LEVOTHROID) 137 MCG tablet Take 137 mcg by mouth Daily.     • lisinopril (PRINIVIL,ZESTRIL) 20 MG tablet Take 1 tablet by mouth Daily. 30 tablet 11   • Multiple Vitamins-Minerals (MULTIVITAMIN ADULT PO) Take  by mouth.     • nicotine polacrilex (NICORETTE) 4 MG gum Chew 4 mg As Needed for smoking cessation.     • nitroglycerin (NITROSTAT) 0.4 MG SL tablet Place 0.4 mg under the tongue every 5 (five) minutes as needed for chest pain. Take no more than 3 doses in 15 minutes.     • PrednisoLONE 5 MG tablet Take  by mouth.    "    No current facility-administered medications for this visit.        Allergies:  Pollen extract    Review of Systems  Review of Systems   Constitution: Positive for malaise/fatigue.   HENT: Negative.    Eyes: Negative.    Cardiovascular: Positive for claudication. Negative for chest pain, cyanosis, dyspnea on exertion, irregular heartbeat, leg swelling, near-syncope, orthopnea, palpitations, paroxysmal nocturnal dyspnea and syncope.   Respiratory: Negative.    Endocrine: Negative.    Hematologic/Lymphatic: Negative.    Skin: Negative.    Musculoskeletal: Positive for arthritis.   Gastrointestinal: Negative for anorexia.   Genitourinary: Negative.    Neurological: Negative.    Psychiatric/Behavioral: Negative.        Objective     Physical Exam:  /80   Pulse 64   Ht 182.9 cm (72\")   Wt 101 kg (222 lb)   SpO2 95%   BMI 30.11 kg/m²   Physical Exam   Constitutional: He appears well-developed.   HENT:   Head: Normocephalic.   Neck: Normal carotid pulses and no JVD present. No tracheal tenderness present. Carotid bruit is not present. No tracheal deviation and no edema present.   Cardiovascular: Regular rhythm, normal heart sounds and normal pulses.   Pulmonary/Chest: Effort normal. No stridor.   Abdominal: Soft. He exhibits no distension. There is no hepatosplenomegaly. There is no tenderness.   Neurological: He is alert. He has normal strength. No cranial nerve deficit or sensory deficit.   Skin: Skin is warm.   Psychiatric: He has a normal mood and affect. His speech is normal and behavior is normal.       Results Review:      Results for orders placed during the hospital encounter of 09/16/16   Adult Stress Echo With Color & Doppler    Narrative [facility] - CARDIOLOGY STRESS ECHO    PATIENT:       DAYDAY CONN  MRN:           [id number]  VISIT ID:      [id number]  MAGALI DR:       Rocky Mendoza*  DATE:          09/16/2016  ROOM:          "   ______________________________________________________________________________    cc:  CHELLE YOUNG    YOB: 1945    PROCEDURE PERFORMED: Lexiscan Cardiolite stress test.    INDICATION: Chest pain.     DESCRIPTION OF PROCEDURE: Patient underwent Lexiscan Cardiolite stress test.   Rest dose of Cardiolite 11 mCi, stress dose 33 mCi. Injection of Lexiscan   caused shortness of breath, no EKG abnormalities that were new. Raw data   shows soft tissue attenuation artifact. Perfusion scan shows normal uptake   without any convincing areas of ischemia or scar. Gated scan shows ejection   fraction of 70%.      IMPRESSION:  1. Ejection fraction of 70%.   2. No areas of ischemia or scar.                                       __________________________                                DIMAS Kitchen/[id number]  D:  09/16/2016 14:50:38 (Eastern Time)  T:  09/16/2016 15:20:16 (Eastern Time)  Voice ID:  [id number]/Document ID:  [id number]  0  DO NOT TEXT EDIT THIS LINE :[id number]:  Authenticated by STEPHANIE DOWLING MD On 09/16/2016 02:34:03 PM              ECG 12 Lead  Date/Time: 7/8/2020 10:38 AM  Performed by: Stephanie Dowling MD  Authorized by: Stephanie Dowling MD   Comparison: compared with previous ECG from 7/9/2019  Comparison to previous ECG: Ventricular rate increased from  54   to  64   beats per minute    Rhythm: sinus rhythm  Rate: normal  Conduction: right bundle branch block and 1st degree AV block  ST Segments: ST segments normal  T Waves: T waves normal  QRS axis: right  Other: no other findings    Clinical impression: abnormal EKG            Assessment/Plan   Patient Active Problem List   Diagnosis   • Hyperlipemia   • Essential hypertension   • Coronary artery disease involving native coronary artery of native heart without angina pectoris   • Stented coronary artery   • History of adenomatous polyp of colon     Results for orders placed during the hospital encounter of 09/16/16   Adult Stress Echo With  Color & Doppler    Narrative [facility] - CARDIOLOGY STRESS ECHO    PATIENT:       DAYDAY CONN  MRN:           [id number]  VISIT ID:      [id number]  MAGALI DR:       Rocky Mendoza*  DATE:          09/16/2016  ROOM:            ______________________________________________________________________________      IMPRESSION:  1. Ejection fraction of 70%.   2. No areas of ischemia or scar.                 ____________________________________________________________________________________________________________________________________________  Health maintenance and recommendations    Similar recommendations as last visit   Advised staying uptodate with immunizations per established standard guidelines.      Offered to give patient  a copy      Questions were encouraged, asked and answered to the patient's  understanding and satisfaction. Questions if any regarding current medications and side effects, need for refills and importance of compliance to medications stressed.    Reviewed available prior notes, consults, prior visits, laboratory findings, radiology and cardiology relevant reports. Updated chart as applicable. I have reviewed the patient's medical history in detail and updated the computerized patient record as relevant.      Updated patient regarding any new or relevant abnormalities on review of records or any new findings on physical exam. Mentioned to patient about purpose of visit and desirable health short and long term goals and objectives.    Primary to monitor CBC CMP Lipid panel and TSH as applicable    ___________________________________________________________________________________________________________________________________________         Plan    Acceptable cardiovascular risk of planned procedure: Colonoscopy  Can proceed with surgery with usual caution and perioperative hemodynamic and cardiac rhythm monitoring.     OK to hold Plavix x 5 days prior to procedure and resume when  possible when acceptable bleeding risks after procedure.     No need for Lovenox     Do not  stop Aspirin.       Keep LDL below 70 mg/dl. Monitor liver and renal functions.   Monitor CBC, CMP, TSH (as indicated) and Lipid Panel by primary     S/L NTG PRN for chest pain, call me or go to ER if has to use S/L nitroglycerin       Orders Placed This Encounter   Procedures   • Stress Test With Myocardial Perfusion One Day     Standing Status:   Future     Standing Expiration Date:   7/8/2021     Order Specific Question:   What stress agent will be used?     Answer:   Regadenoson (Lexiscan)     Order Specific Question:   Difficulty walking criteria?     Answer:   Musculoskeletal (hips, knees, feet, back, amputee)     Order Specific Question:   Reason for exam?     Answer:   Preoperative Cardiac Assessmemt for Vascular Surgery   • ECG 12 Lead     This order was created via procedure documentation   • Adult Transthoracic Echo Complete W/ Cont if Necessary Per Protocol     Standing Status:   Future     Standing Expiration Date:   7/8/2021     Order Specific Question:   Reason for exam?     Answer:   Dyspnea        Call for results of cardiac tests after done for clearance for surgery         Return in about 6 months (around 1/8/2021).

## 2020-07-09 ENCOUNTER — HOSPITAL ENCOUNTER (OUTPATIENT)
Dept: CT IMAGING | Age: 75
Discharge: HOME | End: 2020-07-09
Payer: MEDICARE

## 2020-07-09 ENCOUNTER — HOSPITAL ENCOUNTER (OUTPATIENT)
Dept: NON INVASIVE DIAGNOSTICS | Age: 75
Discharge: HOME | End: 2020-07-09
Payer: MEDICARE

## 2020-07-09 LAB
GFR NON-AFRICAN AMERICAN: 46
PERFORMED ON: ABNORMAL
POC CREATININE: 1.5 MG/DL (ref 0.3–1.3)
POC SAMPLE TYPE: ABNORMAL

## 2020-07-09 PROCEDURE — 93923 UPR/LXTR ART STDY 3+ LVLS: CPT

## 2020-07-09 PROCEDURE — 6360000004 HC RX CONTRAST MEDICATION: Performed by: NURSE PRACTITIONER

## 2020-07-09 PROCEDURE — 82565 ASSAY OF CREATININE: CPT

## 2020-07-09 PROCEDURE — 74176 CT ABD & PELVIS W/O CONTRAST: CPT

## 2020-07-09 PROCEDURE — 93880 EXTRACRANIAL BILAT STUDY: CPT

## 2020-07-15 ENCOUNTER — PATIENT MESSAGE (OUTPATIENT)
Dept: VASCULAR SURGERY | Age: 75
End: 2020-07-15

## 2020-07-15 NOTE — TELEPHONE ENCOUNTER
From: Monica Root  To: ANANT Rome  Sent: 7/15/2020 10:29 AM CDT  Subject: Test Results Question    Dear Gareth Cabrera: First - thanks for calling me so quickly re: the results of the aorta ct scan. Second - what is the status of my leg sonograms? Thank you very much.  Leonila Kim

## 2020-07-21 RX ORDER — AMLODIPINE BESYLATE 10 MG/1
10 TABLET ORAL DAILY
Qty: 90 TABLET | Refills: 2 | Status: SHIPPED | OUTPATIENT
Start: 2020-07-21 | End: 2021-04-07

## 2020-07-29 ENCOUNTER — HOSPITAL ENCOUNTER (OUTPATIENT)
Dept: CARDIOLOGY | Facility: HOSPITAL | Age: 75
Discharge: HOME | End: 2020-07-29

## 2020-07-29 ENCOUNTER — HOSPITAL ENCOUNTER (OUTPATIENT)
Dept: CARDIOLOGY | Facility: HOSPITAL | Age: 75
Discharge: HOME | End: 2020-07-29
Admitting: INTERNAL MEDICINE

## 2020-07-29 VITALS
BODY MASS INDEX: 30.07 KG/M2 | WEIGHT: 222 LBS | SYSTOLIC BLOOD PRESSURE: 158 MMHG | DIASTOLIC BLOOD PRESSURE: 80 MMHG | HEIGHT: 72 IN

## 2020-07-29 VITALS
SYSTOLIC BLOOD PRESSURE: 110 MMHG | WEIGHT: 222.66 LBS | DIASTOLIC BLOOD PRESSURE: 74 MMHG | HEART RATE: 56 BPM | HEIGHT: 72 IN | BODY MASS INDEX: 30.16 KG/M2

## 2020-07-29 DIAGNOSIS — I25.10 CORONARY ARTERY DISEASE INVOLVING NATIVE CORONARY ARTERY OF NATIVE HEART WITHOUT ANGINA PECTORIS: ICD-10-CM

## 2020-07-29 DIAGNOSIS — R94.31 ABNORMAL ECG: ICD-10-CM

## 2020-07-29 LAB
BH CV STRESS BP STAGE 1: NORMAL
BH CV STRESS COMMENTS STAGE 1: NORMAL
BH CV STRESS DOSE REGADENOSON STAGE 1: 0.4
BH CV STRESS DURATION MIN STAGE 1: 0
BH CV STRESS DURATION SEC STAGE 1: 10
BH CV STRESS HR STAGE 1: 76
BH CV STRESS PROTOCOL 1: NORMAL
BH CV STRESS RECOVERY BP: NORMAL MMHG
BH CV STRESS RECOVERY HR: 68 BPM
BH CV STRESS STAGE 1: 1
LV EF NUC BP: 70 %
MAXIMAL PREDICTED HEART RATE: 147 BPM
PERCENT MAX PREDICTED HR: 51.7 %
STRESS BASELINE BP: NORMAL MMHG
STRESS BASELINE HR: 56 BPM
STRESS PERCENT HR: 61 %
STRESS POST EXERCISE DUR SEC: 10 SEC
STRESS POST PEAK BP: NORMAL MMHG
STRESS POST PEAK HR: 76 BPM
STRESS TARGET HR: 125 BPM

## 2020-07-29 PROCEDURE — 78452 HT MUSCLE IMAGE SPECT MULT: CPT | Performed by: INTERNAL MEDICINE

## 2020-07-29 PROCEDURE — A9500 TC99M SESTAMIBI: HCPCS | Performed by: INTERNAL MEDICINE

## 2020-07-29 PROCEDURE — 93017 CV STRESS TEST TRACING ONLY: CPT

## 2020-07-29 PROCEDURE — 78452 HT MUSCLE IMAGE SPECT MULT: CPT

## 2020-07-29 PROCEDURE — 93306 TTE W/DOPPLER COMPLETE: CPT

## 2020-07-29 PROCEDURE — 93356 MYOCRD STRAIN IMG SPCKL TRCK: CPT

## 2020-07-29 PROCEDURE — 93356 MYOCRD STRAIN IMG SPCKL TRCK: CPT | Performed by: INTERNAL MEDICINE

## 2020-07-29 PROCEDURE — 25010000002 PERFLUTREN 6.52 MG/ML SUSPENSION: Performed by: INTERNAL MEDICINE

## 2020-07-29 PROCEDURE — 25010000002 REGADENOSON 0.4 MG/5ML SOLUTION: Performed by: INTERNAL MEDICINE

## 2020-07-29 PROCEDURE — 0 TECHNETIUM SESTAMIBI: Performed by: INTERNAL MEDICINE

## 2020-07-29 PROCEDURE — 93018 CV STRESS TEST I&R ONLY: CPT | Performed by: INTERNAL MEDICINE

## 2020-07-29 PROCEDURE — 93306 TTE W/DOPPLER COMPLETE: CPT | Performed by: INTERNAL MEDICINE

## 2020-07-29 RX ADMIN — TECHNETIUM TC 99M SESTAMIBI 1 DOSE: 1 INJECTION INTRAVENOUS at 10:10

## 2020-07-29 RX ADMIN — REGADENOSON 0.4 MG: 0.08 INJECTION, SOLUTION INTRAVENOUS at 09:03

## 2020-07-29 RX ADMIN — PERFLUTREN 8.48 MG: 6.52 INJECTION, SUSPENSION INTRAVENOUS at 08:23

## 2020-07-29 RX ADMIN — TECHNETIUM TC 99M SESTAMIBI 1 DOSE: 1 INJECTION INTRAVENOUS at 07:45

## 2020-07-31 LAB
BH CV ECHO MEAS - AO MAX PG (FULL): 19.5 MMHG
BH CV ECHO MEAS - AO MAX PG: 22.7 MMHG
BH CV ECHO MEAS - AO MEAN PG (FULL): 8 MMHG
BH CV ECHO MEAS - AO MEAN PG: 9 MMHG
BH CV ECHO MEAS - AO ROOT AREA (BSA CORRECTED): 1.8
BH CV ECHO MEAS - AO ROOT AREA: 12.8 CM^2
BH CV ECHO MEAS - AO ROOT DIAM: 4.5 CM
BH CV ECHO MEAS - AO V2 MAX: 238 CM/SEC
BH CV ECHO MEAS - AO V2 MEAN: 131 CM/SEC
BH CV ECHO MEAS - AO V2 VTI: 62.7 CM
BH CV ECHO MEAS - AVA(I,A): 1.6 CM^2
BH CV ECHO MEAS - AVA(I,D): 1.6 CM^2
BH CV ECHO MEAS - AVA(V,A): 1.7 CM^2
BH CV ECHO MEAS - AVA(V,D): 1.7 CM^2
BH CV ECHO MEAS - BSA(HAYCOCK): 2.3 M^2
BH CV ECHO MEAS - BSA: 2.2 M^2
BH CV ECHO MEAS - BZI_BMI: 30.1 KILOGRAMS/M^2
BH CV ECHO MEAS - BZI_METRIC_HEIGHT: 182.9 CM
BH CV ECHO MEAS - BZI_METRIC_WEIGHT: 100.7 KG
BH CV ECHO MEAS - EDV(CUBED): 126.5 ML
BH CV ECHO MEAS - EDV(MOD-SP4): 167 ML
BH CV ECHO MEAS - EDV(TEICH): 119.3 ML
BH CV ECHO MEAS - EF(CUBED): 73.9 %
BH CV ECHO MEAS - EF(MOD-SP4): 65.9 %
BH CV ECHO MEAS - EF(TEICH): 65.4 %
BH CV ECHO MEAS - ESV(CUBED): 33.1 ML
BH CV ECHO MEAS - ESV(MOD-SP4): 56.9 ML
BH CV ECHO MEAS - ESV(TEICH): 41.3 ML
BH CV ECHO MEAS - FS: 36.1 %
BH CV ECHO MEAS - IVS/LVPW: 1.4
BH CV ECHO MEAS - IVSD: 1.6 CM
BH CV ECHO MEAS - LA DIMENSION: 3.4 CM
BH CV ECHO MEAS - LA/AO: 0.84
BH CV ECHO MEAS - LAT PEAK E' VEL: 7.9 CM/SEC
BH CV ECHO MEAS - LV DIASTOLIC VOL/BSA (35-75): 75 ML/M^2
BH CV ECHO MEAS - LV MASS(C)D: 294.7 GRAMS
BH CV ECHO MEAS - LV MASS(C)DI: 132.4 GRAMS/M^2
BH CV ECHO MEAS - LV MAX PG: 3.2 MMHG
BH CV ECHO MEAS - LV MEAN PG: 1 MMHG
BH CV ECHO MEAS - LV SYSTOLIC VOL/BSA (12-30): 25.5 ML/M^2
BH CV ECHO MEAS - LV V1 MAX: 89.4 CM/SEC
BH CV ECHO MEAS - LV V1 MEAN: 53.8 CM/SEC
BH CV ECHO MEAS - LV V1 VTI: 21.7 CM
BH CV ECHO MEAS - LVIDD: 5 CM
BH CV ECHO MEAS - LVIDS: 3.2 CM
BH CV ECHO MEAS - LVLD AP4: 8.5 CM
BH CV ECHO MEAS - LVLS AP4: 6.7 CM
BH CV ECHO MEAS - LVOT AREA (M): 4.5 CM^2
BH CV ECHO MEAS - LVOT AREA: 4.5 CM^2
BH CV ECHO MEAS - LVOT DIAM: 2.4 CM
BH CV ECHO MEAS - LVPWD: 1.2 CM
BH CV ECHO MEAS - MED PEAK E' VEL: 6.09 CM/SEC
BH CV ECHO MEAS - MV A MAX VEL: 111 CM/SEC
BH CV ECHO MEAS - MV DEC TIME: 0.23 SEC
BH CV ECHO MEAS - MV E MAX VEL: 97 CM/SEC
BH CV ECHO MEAS - MV E/A: 0.87
BH CV ECHO MEAS - RVDD: 3.8 CM
BH CV ECHO MEAS - SI(AO): 359.9 ML/M^2
BH CV ECHO MEAS - SI(CUBED): 42 ML/M^2
BH CV ECHO MEAS - SI(LVOT): 44.1 ML/M^2
BH CV ECHO MEAS - SI(MOD-SP4): 49.4 ML/M^2
BH CV ECHO MEAS - SI(TEICH): 35.1 ML/M^2
BH CV ECHO MEAS - SV(AO): 801.6 ML
BH CV ECHO MEAS - SV(CUBED): 93.4 ML
BH CV ECHO MEAS - SV(LVOT): 98.2 ML
BH CV ECHO MEAS - SV(MOD-SP4): 110.1 ML
BH CV ECHO MEAS - SV(TEICH): 78.1 ML
BH CV ECHO MEASUREMENTS AVERAGE E/E' RATIO: 13.87
LEFT ATRIUM VOLUME INDEX: 26.4 ML/M2
LEFT ATRIUM VOLUME: 58.9 CM3
LV EF 2D ECHO EST: 66 %

## 2020-08-31 ENCOUNTER — OFFICE VISIT (OUTPATIENT)
Dept: UROLOGY | Age: 75
End: 2020-08-31
Payer: MEDICARE

## 2020-08-31 VITALS
WEIGHT: 219 LBS | HEIGHT: 72 IN | BODY MASS INDEX: 29.66 KG/M2 | SYSTOLIC BLOOD PRESSURE: 161 MMHG | DIASTOLIC BLOOD PRESSURE: 83 MMHG | TEMPERATURE: 97.3 F | HEART RATE: 57 BPM

## 2020-08-31 LAB
APPEARANCE FLUID: CLEAR
BILIRUBIN, POC: NORMAL
BLOOD URINE, POC: NORMAL
CLARITY, POC: CLEAR
COLOR, POC: YELLOW
GLUCOSE URINE, POC: NORMAL
KETONES, POC: NORMAL
LEUKOCYTE EST, POC: NORMAL
NITRITE, POC: NORMAL
PH, POC: 6.5
PROTEIN, POC: NORMAL
SPECIFIC GRAVITY, POC: 1.01
UROBILINOGEN, POC: 0.2

## 2020-08-31 PROCEDURE — 81002 URINALYSIS NONAUTO W/O SCOPE: CPT | Performed by: UROLOGY

## 2020-08-31 PROCEDURE — 1123F ACP DISCUSS/DSCN MKR DOCD: CPT | Performed by: UROLOGY

## 2020-08-31 PROCEDURE — 3017F COLORECTAL CA SCREEN DOC REV: CPT | Performed by: UROLOGY

## 2020-08-31 PROCEDURE — G8427 DOCREV CUR MEDS BY ELIG CLIN: HCPCS | Performed by: UROLOGY

## 2020-08-31 PROCEDURE — 4040F PNEUMOC VAC/ADMIN/RCVD: CPT | Performed by: UROLOGY

## 2020-08-31 PROCEDURE — 4004F PT TOBACCO SCREEN RCVD TLK: CPT | Performed by: UROLOGY

## 2020-08-31 PROCEDURE — G8417 CALC BMI ABV UP PARAM F/U: HCPCS | Performed by: UROLOGY

## 2020-08-31 PROCEDURE — 99213 OFFICE O/P EST LOW 20 MIN: CPT | Performed by: UROLOGY

## 2020-08-31 ASSESSMENT — ENCOUNTER SYMPTOMS
RESPIRATORY NEGATIVE: 1
EYES NEGATIVE: 1
ALLERGIC/IMMUNOLOGIC NEGATIVE: 1
GASTROINTESTINAL NEGATIVE: 1

## 2020-08-31 NOTE — PROGRESS NOTES
Faina Palacios is a 77 y.o. male who presents today   Chief Complaint   Patient presents with    Follow-up     I am here today for bladder wall thickening found on ct done at [facility]. Abnormal CT  Patient will follow for prostate cancer. With a documented history as below when he was last seen. He had a CT scan for follow-up of abdominal aortic aneurysm and there was read as bladder wall thickening. He comes in today to discuss this. He is having no urinary symptoms no hematuria he is not had pelvic radiation. No dysuria frequency urgency      Prostate Cancer  Patient is here today for prostate cancer which was first diagnosed 8 years ago. His prostate cancer can be characterized as early stage organ confined. Pathologic stage T2b, Mariano 3+4 = 7 PSA was undetectable until 2016 then he has a very slight biochemical detection on PSA assay but this has remained relatively stable over the last 2 years  His last several PSA values are as follows:  Lab Results   Component Value Date    PSA 0.04 06/16/2020    PSA 0.03 12/09/2019    PSA 0.03 06/04/2019     Previous treatment of prostate cancer: Radical prostatectomy done April 9, 2012  Lower urinary tract symptoms: urine incontinence:  stress  He also has ED he is desired no treatment for this he has no other symptoms no bone pain no weight loss        Past Medical History:   Diagnosis Date    AAA (abdominal aortic aneurysm) without rupture (HCC)     Arthritis     Atheroscler of native artery of both legs with intermit claudication ([address]     BPH without obstruction/lower urinary tract symptoms     CAD in native artery     sees dr. Kristina Manzo Carotid artery stenosis     History of blood transfusion     Hyperlipidemia     Hypertension     Hyperthyroidism     Iron deficiency     Malignant neoplasm prostate ([address]     surgery    Right BBB/left ant fasc block     per dr. Hieu Burden; awaiting ekg from wb/office.     Sleep apnea     cpap used    TIA (transient ischemic attack)        Past Surgical History:   Procedure Laterality Date    CAROTID ENDARTERECTOMY Left 4/12/2017    SJS. Left carotid endarterectomy with bovine pericardial catch. Left cervical carotid arteriograms.  CATARACT REMOVAL Bilateral     CORONARY ANGIOPLASTY WITH STENT PLACEMENT  2010    dr. Chelsea Delgado Left 8/16/2017    WOUND EXPLORATION FOR POST-OP BLEEDING performed by Judson Patel MD at [place] [address] Left 8/16/2017    PERCUTANEOUS TRANSLUMINAL BALLOON ANGIOPLASTY OF LEFT COMMON AND EXTERNAL ILIAC ARTERIES AND RIGHT, EXTERNAL ILIAC ARTERY; LEFT COMMON FEMORAL ENDARTERECTOMY WITH BOVINE PATCH ANGIOPLASTY, SUPERFICIAL FEMORAL ENDARECTOMY WITH BOVINE PATCHING performed by Judson Patel MD at [address] [place] [address] VASCULAR SURGERY  05/22/2017    Right CFA 5f-6f 90 cm sheath, Right EIA stent (Express 8x27), Arch aortogram, Right carotid/intracerebral arteriograms, Right distal ICA emboshield filter, Right ICA stent (xact 8-10x40), Right ICA stent balloon angioplasty 6x40 viatrac, Retrieval of emoshield filter, Right carotid/intracerebral arteriograms, Distal aortogram w/ bilateral iliofemoral arterigrams, Mynx right CFA-- Amrik Chen VASCULAR SURGERY  07/25/2017    S. Right CFA 5f sheath,aortogram with bilateral lower arteriogram,mynx right CFA.  VASCULAR SURGERY  08/09/2017    S Percutaneous cannulation right common femoral artery with 5 Georgian glide sheath. Suprarenal abdominal aortogram with bilateral lower extremity arteriogram.Mynx closure right common femoral artery puncture site.     WRIST FRACTURE SURGERY Right        Current Outpatient Medications   Medication Sig Dispense Refill    ferrous sulfate 325 (65 Fe) MG tablet TAKE ONE TABLET BY MOUTH TWICE DAILY      amLODIPine (NORVASC) 10 MG tablet   2    nicotine polacrilex (NICORETTE) 4 MG gum 4 mg      clopidogrel (PLAVIX) 75 MG tablet Take 1 tablet None     Attends Yarsanism service: None     Active member of club or organization: None     Attends meetings of clubs or organizations: None     Relationship status: None    Intimate partner violence     Fear of current or ex partner: None     Emotionally abused: None     Physically abused: None     Forced sexual activity: None   Other Topics Concern    None   Social History Narrative    None       Family History   Problem Relation Age of Onset    Kidney Disease Father     Dementia Father         alzheimers    Ovarian Cancer Mother     Breast Cancer Sister        REVIEW OF SYSTEMS:  Review of Systems   Constitutional: Negative. HENT: Negative. Eyes: Negative. Respiratory: Negative. Cardiovascular: Negative. Gastrointestinal: Negative. Endocrine: Negative. Genitourinary: Negative. Musculoskeletal: Negative. Skin: Negative. Allergic/Immunologic: Negative. Neurological: Negative. Hematological: Negative. Psychiatric/Behavioral: Negative. PHYSICAL EXAM:  BP (!) 161/83   Pulse 57   Temp 97.3 °F (36.3 °C) (Temporal)   Ht 6' (1.829 m)   Wt 219 lb (99.3 kg)   BMI 29.70 kg/m²   Physical Exam  Constitutional:       General: He is not in acute distress. Appearance: Normal appearance. He is well-developed. HENT:      Head: Normocephalic and atraumatic. Nose: Nose normal.   Eyes:      General: No scleral icterus. Conjunctiva/sclera: Conjunctivae normal.      Pupils: Pupils are equal, round, and reactive to light. Neck:      Musculoskeletal: Normal range of motion and neck supple. Trachea: No tracheal deviation. Cardiovascular:      Rate and Rhythm: Normal rate and regular rhythm. Pulmonary:      Effort: Pulmonary effort is normal. No respiratory distress. Breath sounds: No stridor. Abdominal:      General: There is no distension. Palpations: Abdomen is soft. There is no mass. Tenderness: There is no abdominal tenderness. Musculoskeletal: Normal range of motion. General: No tenderness. Lymphadenopathy:      Cervical: No cervical adenopathy. Skin:     General: Skin is warm and dry. Findings: No erythema. Neurological:      Mental Status: He is alert and oriented to person, place, and time. Psychiatric:         Behavior: Behavior normal.         Judgment: Judgment normal.             DATA:    Results for orders placed or performed in visit on 08/31/20   POCT Urinalysis no Micro   Result Value Ref Range    Color, UA yellow     Clarity, UA clear     Glucose, UA POC neg     Bilirubin, UA neg     Ketones, UA neg     Spec Grav, UA 1.015     Blood, UA POC neg     pH, UA 6.5     Protein, UA POC neg     Urobilinogen, UA 0.2     Leukocytes, UA neg     Nitrite, UA neg     Appearance, Fluid Clear Clear, Slightly Cloudy     Lab Results   Component Value Date    PSA 0.04 06/16/2020    PSA 0.03 12/09/2019    PSA 0.03 06/04/2019        IMAGING:  I reviewed the CT scan done without contrast on 7/9/2020. The bladder is not distended. I see no asymmetrical thickening of the bladder. This appears normal    1. Bladder wall thickening  This does not appear abnormal certainly there is no asymmetrical thickening is having no local symptoms. No further evaluation or work-up needed. - POCT Urinalysis no Micro    2. Malignant neoplasm of prostate [facility])  Patient is to keep his scheduled appointment      Orders Placed This Encounter   Procedures    POCT Urinalysis no Micro        Return for Keep scheduled appointment. All information inputted into the note by the MA to include chief complaint, past medical history, past surgical history, medications, allergies, social and family history and review of systems has been reviewed and updated as needed by me. EMR Dragon/transcription disclaimer: Much of this documentt is electronic  transcription/translation of spoken language to printed text.  The  electronic translation of spoken language may be erroneous, or at times,  nonsensical words or phrases may be inadvertently transcribed.  Although I  have reviewed the document for such errors, some may still exist.

## 2020-09-02 ENCOUNTER — TRANSCRIBE ORDERS (OUTPATIENT)
Dept: ADMINISTRATIVE | Facility: HOSPITAL | Age: 75
End: 2020-09-02

## 2020-09-02 DIAGNOSIS — R91.1 LUNG NODULE: Primary | ICD-10-CM

## 2020-09-04 ENCOUNTER — HOSPITAL ENCOUNTER (OUTPATIENT)
Dept: CT IMAGING | Facility: HOSPITAL | Age: 75
End: 2020-09-04

## 2020-09-04 ENCOUNTER — APPOINTMENT (OUTPATIENT)
Dept: CT IMAGING | Facility: HOSPITAL | Age: 75
End: 2020-09-04

## 2020-09-10 ENCOUNTER — HOSPITAL ENCOUNTER (OUTPATIENT)
Dept: CT IMAGING | Facility: HOSPITAL | Age: 75
Discharge: HOME | End: 2020-09-10

## 2020-09-10 DIAGNOSIS — R91.1 LUNG NODULE: ICD-10-CM

## 2020-09-10 PROCEDURE — A9552 F18 FDG: HCPCS | Performed by: INTERNAL MEDICINE

## 2020-09-10 PROCEDURE — 78815 PET IMAGE W/CT SKULL-THIGH: CPT

## 2020-09-10 PROCEDURE — 0 FLUDEOXYGLUCOSE F18 SOLUTION: Performed by: INTERNAL MEDICINE

## 2020-09-10 RX ADMIN — FLUDEOXYGLUCOSE F18 1 DOSE: 300 INJECTION INTRAVENOUS at 08:31

## 2020-09-14 RX ORDER — CLOPIDOGREL BISULFATE 75 MG/1
75 TABLET ORAL DAILY
Qty: 90 TABLET | Refills: 4 | Status: SHIPPED | OUTPATIENT
Start: 2020-09-14 | End: 2021-08-25

## 2020-11-16 ENCOUNTER — NURSE TRIAGE (OUTPATIENT)
Dept: OTHER | Facility: CLINIC | Age: 75
End: 2020-11-16

## 2020-11-16 NOTE — TELEPHONE ENCOUNTER
Received call from Covid line, patient was exposed to granddaughter that has now tested positive. Seeking advise. Attention Provider: Thank you for allowing me to participate in the care of your patient. The  patient was connected to triage in response to information provided to the [facility]. Please do not respond through this encounter as the response is not directed to a shared pool. Reason for Disposition   COVID-19 Testing, questions about    Answer Assessment - Initial Assessment Questions  1. CLOSE CONTACT: \"Who is the person with the confirmed or suspected COVID-19 infection that you were exposed to? \"      Granddaughter     2. PLACE of CONTACT: \"Where were you when you were exposed to COVID-19? \" (e.g., home, school, medical waiting room; which city?)      Home 3-4 hours    3. TYPE of CONTACT: \"How much contact was there? \" (e.g., sitting next to, live in same house, work in same office, same building)      Same house     4. DURATION of CONTACT: \"How long were you in contact with the COVID-19 patient? \" (e.g., a few seconds, passed by person, a few minutes, live with the patient)      3-4 hours     5. DATE of CONTACT: \"When did you have contact with a COVID-19 patient? \" (e.g., how many days ago)      11/07/2020  6. TRAVEL: \"Have you traveled out of the country recently? \" If so, \"When and where? \"      * Also ask about out-of-state travel, since the [facility] has identified some high-risk cities for community spread in the [address] [place] [address],[address]. * Note: Travel becomes less relevant if there is widespread community transmission where the patient lives. Denies     7. COMMUNITY SPREAD: \"Are there lots of cases of COVID-19 (community spread) where you live? \" (See public health department website, if unsure)        [facility]     8. SYMPTOMS: \"Do you have any symptoms? \" (e.g., fever, cough, breathing difficulty)      Denies     9. PREGNANCY OR POSTPARTUM: \"Is there any chance you are pregnant? \" \"When was your last menstrual period? \" \"Did you deliver in the last 2 weeks? \"      N/a     10. HIGH RISK: \"Do you have any heart or lung problems? Do you have a weak immune system? \" (e.g., CHF, COPD, asthma, HIV positive, chemotherapy, renal failure, diabetes mellitus, sickle cell anemia)        Denies    Protocols used: CORONAVIRUS (COVID-19) EXPOSURE-ADULT-[place]

## 2020-12-14 DIAGNOSIS — C61 MALIGNANT NEOPLASM OF PROSTATE (HCC): ICD-10-CM

## 2020-12-14 LAB — PROSTATE SPECIFIC ANTIGEN: 0.04 NG/ML (ref 0–4)

## 2021-01-04 ENCOUNTER — OFFICE VISIT (OUTPATIENT)
Dept: UROLOGY | Age: 76
End: 2021-01-04
Payer: MEDICARE

## 2021-01-04 VITALS — HEIGHT: 71 IN | TEMPERATURE: 97.8 F | BODY MASS INDEX: 31.36 KG/M2 | WEIGHT: 224 LBS

## 2021-01-04 DIAGNOSIS — C61 MALIGNANT NEOPLASM OF PROSTATE (HCC): Primary | ICD-10-CM

## 2021-01-04 LAB
BILIRUBIN, POC: NORMAL
BLOOD URINE, POC: NORMAL
CLARITY, POC: CLEAR
COLOR, POC: NORMAL
GLUCOSE URINE, POC: NORMAL
KETONES, POC: NORMAL
LEUKOCYTE EST, POC: NORMAL
NITRITE, POC: NORMAL
PH, POC: 5.5
PROTEIN, POC: NORMAL
SPECIFIC GRAVITY, POC: 1.01
UROBILINOGEN, POC: 0.2

## 2021-01-04 PROCEDURE — 81003 URINALYSIS AUTO W/O SCOPE: CPT | Performed by: UROLOGY

## 2021-01-04 PROCEDURE — G8427 DOCREV CUR MEDS BY ELIG CLIN: HCPCS | Performed by: UROLOGY

## 2021-01-04 PROCEDURE — G8417 CALC BMI ABV UP PARAM F/U: HCPCS | Performed by: UROLOGY

## 2021-01-04 PROCEDURE — 99213 OFFICE O/P EST LOW 20 MIN: CPT | Performed by: UROLOGY

## 2021-01-04 PROCEDURE — 4004F PT TOBACCO SCREEN RCVD TLK: CPT | Performed by: UROLOGY

## 2021-01-04 PROCEDURE — 4040F PNEUMOC VAC/ADMIN/RCVD: CPT | Performed by: UROLOGY

## 2021-01-04 PROCEDURE — G8484 FLU IMMUNIZE NO ADMIN: HCPCS | Performed by: UROLOGY

## 2021-01-04 PROCEDURE — 3017F COLORECTAL CA SCREEN DOC REV: CPT | Performed by: UROLOGY

## 2021-01-04 PROCEDURE — 1123F ACP DISCUSS/DSCN MKR DOCD: CPT | Performed by: UROLOGY

## 2021-01-04 ASSESSMENT — ENCOUNTER SYMPTOMS
COUGH: 0
BACK PAIN: 0
SORE THROAT: 0
VOMITING: 0
NAUSEA: 0
EYE PAIN: 0
WHEEZING: 0

## 2021-01-04 NOTE — PROGRESS NOTES
8/16/2017    PERCUTANEOUS TRANSLUMINAL BALLOON ANGIOPLASTY OF LEFT COMMON AND EXTERNAL ILIAC ARTERIES AND RIGHT, EXTERNAL ILIAC ARTERY; LEFT COMMON FEMORAL ENDARTERECTOMY WITH BOVINE PATCH ANGIOPLASTY, SUPERFICIAL FEMORAL ENDARECTOMY WITH BOVINE PATCHING performed by Chani Manzanares MD at [address] [facility] VASCULAR SURGERY  05/22/2017    Right CFA 5f-6f 90 cm sheath, Right EIA stent (Express 8x27), Arch aortogram, Right carotid/intracerebral arteriograms, Right distal ICA emboshield filter, Right ICA stent (xact 8-10x40), Right ICA stent balloon angioplasty 6x40 viatrac, Retrieval of emoshield filter, Right carotid/intracerebral arteriograms, Distal aortogram w/ bilateral iliofemoral arterigrams, Mynx right CFA-- [facility] VASCULAR SURGERY  07/25/2017    SJS. Right CFA 5f sheath,aortogram with bilateral lower arteriogram,mynx right CFA.  VASCULAR SURGERY  08/09/2017    SJS Percutaneous cannulation right common femoral artery with 5 German glide sheath. Suprarenal abdominal aortogram with bilateral lower extremity arteriogram.Mynx closure right common femoral artery puncture site.     WRIST FRACTURE SURGERY Right        Current Outpatient Medications   Medication Sig Dispense Refill    ferrous sulfate 325 (65 Fe) MG tablet TAKE ONE TABLET BY MOUTH TWICE DAILY      amLODIPine (NORVASC) 10 MG tablet   2    nicotine polacrilex (NICORETTE) 4 MG gum 4 mg      clopidogrel (PLAVIX) 75 MG tablet Take 1 tablet by mouth daily 30 tablet 0    Multiple Vitamins-Minerals (MULTI FOR HIM 50+ PO) Take 1 tablet by mouth daily       levothyroxine (SYNTHROID) 125 MCG tablet Take 137 mcg by mouth Daily       aspirin 325 MG tablet Take 325 mg by mouth daily       atenolol (TENORMIN) 50 MG tablet Take 25 mg by mouth daily Indications: High Blood Pressure       atorvastatin (LIPITOR) 20 MG tablet Take 40 mg by mouth nightly Indications: Changes in Cholesterol       Cholecalciferol (VITAMIN D3) 1000 UNITS TABS Take 1,000 Units by mouth daily       coenzyme Q10 100 MG CAPS capsule Take 100 mg by mouth daily       lisinopril (PRINIVIL;ZESTRIL) 20 MG tablet Take 20 mg by mouth daily       nitroGLYCERIN (NITROSTAT) 0.4 MG SL tablet Place 0.4 mg under the tongue every 5 minutes as needed        No current facility-administered medications for this visit. Allergies   Allergen Reactions    Pollen Extract        Social History     Socioeconomic History    Marital status:      Spouse name: None    Number of children: None    Years of education: None    Highest education level: None   Occupational History    None   Social Needs    Financial resource strain: None    Food insecurity     Worry: None     Inability: None    Transportation needs     Medical: None     Non-medical: None   Tobacco Use    Smoking status: Light Tobacco Smoker     Packs/day: 0.25     Years: 50.00     Pack years: 12.50     Last attempt to quit: 4/10/2017     Years since quitting: 3.7    Smokeless tobacco: Never Used    Tobacco comment: Pt is trying to quit   Substance and Sexual Activity    Alcohol use:  Yes     Alcohol/week: 2.0 standard drinks     Types: 2 Cans of beer per week     Comment: 2 beers in the evening    Drug use: No    Sexual activity: None   Lifestyle    Physical activity     Days per week: None     Minutes per session: None    Stress: None   Relationships    Social connections     Talks on phone: None     Gets together: None     Attends Islam service: None     Active member of club or organization: None     Attends meetings of clubs or organizations: None     Relationship status: None    Intimate partner violence     Fear of current or ex partner: None     Emotionally abused: None     Physically abused: None     Forced sexual activity: None   Other Topics Concern    None   Social History Narrative    None       Family History   Problem Relation Age of Onset  Kidney Disease Father     Dementia Father         alzheimers    Ovarian Cancer Mother     Breast Cancer Sister        REVIEW OF SYSTEMS:  Review of Systems   Constitutional: Negative for chills and fever. HENT: Negative for congestion and sore throat. Eyes: Negative for pain and visual disturbance. Respiratory: Negative for cough and wheezing. Cardiovascular: Negative for chest pain and palpitations. Gastrointestinal: Negative for nausea and vomiting. Endocrine: Negative for polyphagia and polyuria. Genitourinary: Negative for decreased urine volume, difficulty urinating, discharge, dysuria, enuresis, flank pain, frequency, genital sores, hematuria, penile pain, penile swelling, scrotal swelling, testicular pain and urgency. Musculoskeletal: Negative for back pain and neck pain. Skin: Negative for rash and wound. Allergic/Immunologic: Negative for environmental allergies and food allergies. Neurological: Negative for dizziness and headaches. Hematological: Negative for adenopathy. Does not bruise/bleed easily. Psychiatric/Behavioral: Negative for confusion and hallucinations. All other systems reviewed and are negative. PHYSICAL EXAM:  Temp 97.8 °F (36.6 °C) (Temporal)   Ht 5' 11\" (1.803 m)   Wt 224 lb (101.6 kg)   BMI 31.24 kg/m²   Physical Exam  Constitutional:       General: He is not in acute distress. Appearance: Normal appearance. He is well-developed. HENT:      Head: Normocephalic and atraumatic. Nose: Nose normal.   Eyes:      General: No scleral icterus. Conjunctiva/sclera: Conjunctivae normal.      Pupils: Pupils are equal, round, and reactive to light. Neck:      Musculoskeletal: Normal range of motion and neck supple. Trachea: No tracheal deviation. Cardiovascular:      Rate and Rhythm: Normal rate and regular rhythm. Pulmonary:      Effort: Pulmonary effort is normal. No respiratory distress. Breath sounds: No stridor. Abdominal:      General: There is no distension. Palpations: Abdomen is soft. There is no mass. Tenderness: There is no abdominal tenderness. Musculoskeletal: Normal range of motion. General: No tenderness. Lymphadenopathy:      Cervical: No cervical adenopathy. Skin:     General: Skin is warm and dry. Findings: No erythema. Neurological:      Mental Status: He is alert and oriented to person, place, and time. Psychiatric:         Behavior: Behavior normal.         Judgment: Judgment normal.             DATA:    Results for orders placed or performed in visit on 01/04/21   POCT Urinalysis No Micro (Auto)   Result Value Ref Range    Color, UA yello     Clarity, UA clear     Glucose, UA POC neg     Bilirubin, UA neg     Ketones, UA neg     Spec Grav, UA 1.015     Blood, UA POC neg     pH, UA 5.5     Protein, UA POC neg     Urobilinogen, UA 0.2     Leukocytes, UA neg     Nitrite, UA neg      Lab Results   Component Value Date    PSA 0.04 12/14/2020    PSA 0.04 06/16/2020    PSA 0.03 12/09/2019       1. Malignant neoplasm of prostate (HCC)  PSA remains very low and stable continue to monitor  - POCT Urinalysis No Micro (Auto)  - PSA, Diagnostic; Future      Orders Placed This Encounter   Procedures    PSA, Diagnostic     PSA in 6 month     Standing Status:   Future     Standing Expiration Date:   1/4/2022    POCT Urinalysis No Micro (Auto)        Return in about 6 months (around 7/4/2021) for PSA prior to vext visit. All information inputted into the note by the MA to include chief complaint, past medical history, past surgical history, medications, allergies, social and family history and review of systems has been reviewed and updated as needed by me. EMR Dragon/transcription disclaimer: Much of this documentt is electronic  transcription/translation of spoken language to printed text.  The  electronic translation of spoken language may be erroneous, or at times,  nonsensical

## 2021-01-21 ENCOUNTER — OFFICE VISIT (OUTPATIENT)
Dept: CARDIOLOGY | Facility: CLINIC | Age: 76
End: 2021-01-21

## 2021-01-21 VITALS
HEIGHT: 72 IN | DIASTOLIC BLOOD PRESSURE: 76 MMHG | BODY MASS INDEX: 30.2 KG/M2 | HEART RATE: 62 BPM | WEIGHT: 223 LBS | SYSTOLIC BLOOD PRESSURE: 144 MMHG

## 2021-01-21 DIAGNOSIS — I10 ESSENTIAL HYPERTENSION: ICD-10-CM

## 2021-01-21 DIAGNOSIS — E78.2 MIXED HYPERLIPIDEMIA: ICD-10-CM

## 2021-01-21 DIAGNOSIS — I25.10 CORONARY ARTERY DISEASE INVOLVING NATIVE CORONARY ARTERY OF NATIVE HEART WITHOUT ANGINA PECTORIS: ICD-10-CM

## 2021-01-21 DIAGNOSIS — I35.0 MILD AORTIC STENOSIS: ICD-10-CM

## 2021-01-21 DIAGNOSIS — Z95.5 STENTED CORONARY ARTERY: Primary | ICD-10-CM

## 2021-01-21 DIAGNOSIS — R94.31 ABNORMAL ECG: ICD-10-CM

## 2021-01-21 PROCEDURE — 99214 OFFICE O/P EST MOD 30 MIN: CPT | Performed by: INTERNAL MEDICINE

## 2021-01-21 PROCEDURE — 93000 ELECTROCARDIOGRAM COMPLETE: CPT | Performed by: INTERNAL MEDICINE

## 2021-01-21 NOTE — PROGRESS NOTES
Robin Herrera  [id number]  1945  75 y.o.  male    Referring Provider: Wilber Rivera Jr., MD    Reason for Follow-up Visit:  Routine follow up.   Prior visit  preoperative cardiovascular clearance for abdominal aortic aneurysm   Due for abdominal aortic aneurysm in future and currently being monitored by Dr Ty  Prior SVT      Subjective      Overall feels the same   No new events or complaints since last visit   Overall the patient feels no major change from baseline symptoms   Similar symptoms as during last visit     Tolerating current medications well with no untoward side effects   Compliant with prescribed medication regimen. Tries to adhere to cardiac diet.      Overall feeling well   No chest pain or shortness of breath     No palpitations  No significant pedal edema    Compliant with medications and dietary advice  Good effort tolerance    No presyncope or syncope  Compliant with medications      Not checking blood pressures regularly at home     Effort tolerance limited more by orthopedic rather than cardiac related issues, therefore difficult to assess functional capacity.     Abnormal ECG           History of present illness:  Robin Herrera is a 75 y.o. yo male with history of carotid stenosis who presents today for   Chief Complaint   Patient presents with   • Coronary Artery Disease     6 mo f/u   .    History  Past Medical History:   Diagnosis Date   • AAA (abdominal aortic aneurysm) (CMS/HCC)    • Atrial fibrillation (CMS/HCC)    • CAD in native artery    • Cancer (CMS/HCC) 2014    prostate treated with prostatectomy    • Disease of thyroid gland    • Headache    • History of transfusion    • Hyperlipidemia    • Hypertension    ,   Past Surgical History:   Procedure Laterality Date   • CARDIAC CATHETERIZATION     • CAROTID STENT     • CAROTID STENT      Right    • COLONOSCOPY  06/04/2014    diverticulosis otherwise normal colonoscopy without evidence of recurrence of polyps   •  COLONOSCOPY N/A 7/16/2019    Procedure: COLONOSCOPY WITH ANESTHESIA;  Surgeon: Shivam Nielsen DO;  Location: [facility] ENDOSCOPY;  Service: Gastroenterology   • CORONARY STENT PLACEMENT      x 1 - 2010   • EYE SURGERY Bilateral     cataracts    • PROSTATECTOMY     • TONSILLECTOMY     • TONSILLECTOMY     • VEIN SURGERY      vascular / Dr. Ty   ,   Family History   Problem Relation Age of Onset   • Alzheimer's disease Father    • Kidney failure Father    • Stroke Father    • Coronary artery disease Neg Hx    • Colon cancer Neg Hx    • Colon polyps Neg Hx    • Esophageal cancer Neg Hx    ,   Social History     Tobacco Use   • Smoking status: Current Every Day Smoker     Packs/day: 0.50     Years: 50.00     Pack years: 25.00     Types: Cigarettes   • Smokeless tobacco: Never Used   Substance Use Topics   • Alcohol use: Yes     Comment: daily   • Drug use: No   ,     Medications  Current Outpatient Medications   Medication Sig Dispense Refill   • amLODIPine (NORVASC) 10 MG tablet TAKE 1 TABLET BY MOUTH DAILY. 90 tablet 2   • aspirin 325 MG tablet Take 325 mg by mouth daily.     • atenolol (TENORMIN) 25 MG tablet Take 1 tablet by mouth Daily. 30 tablet 11   • atorvastatin (LIPITOR) 40 MG tablet Take 1 tablet by mouth Daily. (Patient taking differently: Take 80 mg by mouth Daily.) 30 tablet 11   • cholecalciferol (VITAMIN D3) 1000 UNITS tablet Take 1,000 Units by mouth daily.     • clopidogrel (PLAVIX) 75 MG tablet Take 1 tablet by mouth Daily. 90 tablet 4   • coenzyme Q10 100 MG capsule Take 100 mg by mouth daily.     • ferrous sulfate 325 (65 FE) MG tablet TAKE ONE TABLET BY MOUTH TWICE DAILY     • levothyroxine (SYNTHROID, LEVOTHROID) 137 MCG tablet Take 137 mcg by mouth Daily.     • lisinopril (PRINIVIL,ZESTRIL) 20 MG tablet Take 1 tablet by mouth Daily. 30 tablet 11   • Multiple Vitamins-Minerals (MULTIVITAMIN ADULT PO) Take  by mouth.     • nicotine polacrilex (NICORETTE) 4 MG gum Chew 4 mg As Needed for  "smoking cessation.     • nitroglycerin (NITROSTAT) 0.4 MG SL tablet Place 0.4 mg under the tongue every 5 (five) minutes as needed for chest pain. Take no more than 3 doses in 15 minutes.     • PrednisoLONE 5 MG tablet Take  by mouth.       No current facility-administered medications for this visit.        Allergies:  Pollen extract    Review of Systems  Review of Systems   Constitution: Positive for malaise/fatigue.   HENT: Negative.    Eyes: Negative.    Cardiovascular: Positive for claudication. Negative for chest pain, cyanosis, dyspnea on exertion, irregular heartbeat, leg swelling, near-syncope, orthopnea, palpitations, paroxysmal nocturnal dyspnea and syncope.   Respiratory: Negative.    Endocrine: Negative.    Hematologic/Lymphatic: Negative.    Skin: Negative.    Musculoskeletal: Positive for arthritis.   Gastrointestinal: Negative for anorexia.   Genitourinary: Negative.    Neurological: Negative.    Psychiatric/Behavioral: Negative.        Objective     Physical Exam:  /76   Pulse 62   Ht 182.9 cm (72\")   Wt 101 kg (223 lb)   BMI 30.24 kg/m²   Physical Exam   Constitutional: He appears well-developed.   HENT:   Head: Normocephalic.   Neck: Normal carotid pulses and no JVD present. No tracheal tenderness present. Carotid bruit is not present. No tracheal deviation and no edema present.   Cardiovascular: Regular rhythm and normal pulses.   Murmur heard.   Systolic murmur is present with a grade of 2/6.  Pulmonary/Chest: Effort normal. No stridor.   Abdominal: Soft. He exhibits no distension. There is no abdominal tenderness.   Neurological: He is alert. No cranial nerve deficit or sensory deficit.   Skin: Skin is warm.   Psychiatric: His speech is normal and behavior is normal.       Results Review:      Results for orders placed during the hospital encounter of 07/29/20   Adult Transthoracic Echo Complete W/ Cont if Necessary Per Protocol    Narrative · Estimated EF = 66%.  · Left ventricular " systolic function is normal.  · Mild aortic valve stenosis is present.  · Mild dilation of the aortic root is present.  · Mild aortic valve regurgitation is present.  · Abnormal global Longitudinal LV strain = -14.6%.                ECG 12 Lead    Date/Time: 1/21/2021 10:24 AM  Performed by: Rocky Mendoza MD  Authorized by: Rocky Mendoza MD   Comparison: compared with previous ECG from 7/18/2020  Similar to previous ECG  Rhythm: sinus rhythm  Rate: normal  Conduction: 1st degree AV block and non-specific intraventricular conduction delay    Clinical impression: abnormal EKG            Assessment/Plan   Patient Active Problem List   Diagnosis   • Hyperlipemia   • Essential hypertension   • Coronary artery disease involving native coronary artery of native heart without angina pectoris   • Stented coronary artery   • History of adenomatous polyp of colon   • Abnormal ECG first degree AV block    • Mild aortic stenosis     Results for orders placed during the hospital encounter of 09/16/16   Adult Stress Echo With Color & Doppler    Narrative [facility] - CARDIOLOGY STRESS ECHO    PATIENT:       DAYDAY CONN  MRN:           [id number]  VISIT ID:      [id number]  CATH DR:       Rocky Mendoza*  DATE:          09/16/2016  ROOM:            ______________________________________________________________________________      IMPRESSION:  1. Ejection fraction of 70%.   2. No areas of ischemia or scar.                 Plan      Discussed results of prior testing with patient: echo  Patient expressed understanding  Encouraged and answered all questions   Discussed with the patient and all questioned fully answered. He will call me if any problems arise.      The current medical regimen is effective;  continue present plan and medications.     Check BP and heart rates twice daily at least 3x / week  at home and bring a recording for me to review next visit  If BP >130/85 or < 100/60 persistently over 3 reading 30 mins  apart call sooner    Continue Lisinopril and beta blocker therapy     Keep LDL below 70 mg/dl. Monitor liver and renal functions.   Monitor CBC, CMP, TSH (as indicated) and Lipid Panel by primary     I support the patient's decision to take the Covid -19 vaccine      Overall doing well no new cardiovascular symptoms and therefore no additional cardiac testing is required   If any interim issues arise will call me for further evaluation.   In future will repeat echo for mild aortic stenosis   No need to be done this year     S/L NTG PRN for chest pain, call me or go to ER if has to use S/L nitroglycerin           Return in about 6 months (around 7/21/2021).

## 2021-01-27 ENCOUNTER — HOSPITAL ENCOUNTER (OUTPATIENT)
Dept: VASCULAR LAB | Age: 76
Discharge: HOME | End: 2021-01-27
Payer: MEDICARE

## 2021-01-27 ENCOUNTER — HOSPITAL ENCOUNTER (OUTPATIENT)
Dept: CT IMAGING | Age: 76
Discharge: HOME | End: 2021-01-27
Payer: MEDICARE

## 2021-01-27 DIAGNOSIS — I70.213 ATHEROSCLEROSIS OF NATIVE ARTERY OF BOTH LOWER EXTREMITIES WITH INTERMITTENT CLAUDICATION (HCC): ICD-10-CM

## 2021-01-27 DIAGNOSIS — I65.23 BILATERAL CAROTID ARTERY STENOSIS: ICD-10-CM

## 2021-01-27 DIAGNOSIS — I71.40 AAA (ABDOMINAL AORTIC ANEURYSM) WITHOUT RUPTURE: ICD-10-CM

## 2021-01-27 PROCEDURE — 93923 UPR/LXTR ART STDY 3+ LVLS: CPT

## 2021-01-27 PROCEDURE — 93880 EXTRACRANIAL BILAT STUDY: CPT

## 2021-01-27 PROCEDURE — 74176 CT ABD & PELVIS W/O CONTRAST: CPT

## 2021-02-04 ENCOUNTER — VIRTUAL VISIT (OUTPATIENT)
Dept: VASCULAR SURGERY | Age: 76
End: 2021-02-04
Payer: MEDICARE

## 2021-02-04 DIAGNOSIS — I70.213 ATHEROSCLEROSIS OF NATIVE ARTERY OF BOTH LOWER EXTREMITIES WITH INTERMITTENT CLAUDICATION (HCC): ICD-10-CM

## 2021-02-04 DIAGNOSIS — I71.40 AAA (ABDOMINAL AORTIC ANEURYSM) WITHOUT RUPTURE: ICD-10-CM

## 2021-02-04 DIAGNOSIS — I65.23 BILATERAL CAROTID ARTERY STENOSIS: Primary | ICD-10-CM

## 2021-02-04 PROCEDURE — 4040F PNEUMOC VAC/ADMIN/RCVD: CPT | Performed by: NURSE PRACTITIONER

## 2021-02-04 PROCEDURE — G8484 FLU IMMUNIZE NO ADMIN: HCPCS | Performed by: NURSE PRACTITIONER

## 2021-02-04 PROCEDURE — 3017F COLORECTAL CA SCREEN DOC REV: CPT | Performed by: NURSE PRACTITIONER

## 2021-02-04 PROCEDURE — G8427 DOCREV CUR MEDS BY ELIG CLIN: HCPCS | Performed by: NURSE PRACTITIONER

## 2021-02-04 PROCEDURE — 4004F PT TOBACCO SCREEN RCVD TLK: CPT | Performed by: NURSE PRACTITIONER

## 2021-02-04 PROCEDURE — G8417 CALC BMI ABV UP PARAM F/U: HCPCS | Performed by: NURSE PRACTITIONER

## 2021-02-04 PROCEDURE — 1123F ACP DISCUSS/DSCN MKR DOCD: CPT | Performed by: NURSE PRACTITIONER

## 2021-02-04 PROCEDURE — 99214 OFFICE O/P EST MOD 30 MIN: CPT | Performed by: NURSE PRACTITIONER

## 2021-02-04 NOTE — PROGRESS NOTES
 AAA (abdominal aortic aneurysm) without rupture (HCC)     Atherosclerosis of native artery of both lower extremities with intermittent claudication (HCC)     Bilateral carotid artery stenosis     Malignant neoplasm of prostate ([facility] [address] 10/05/2016    Presence of stent in coronary artery 09/27/2016     Current Outpatient Medications   Medication Sig Dispense Refill    ferrous sulfate 325 (65 Fe) MG tablet TAKE ONE TABLET BY MOUTH TWICE DAILY      amLODIPine (NORVASC) 10 MG tablet   2    nicotine polacrilex (NICORETTE) 4 MG gum 4 mg      clopidogrel (PLAVIX) 75 MG tablet Take 1 tablet by mouth daily 30 tablet 0    Multiple Vitamins-Minerals (MULTI FOR HIM 50+ PO) Take 1 tablet by mouth daily       levothyroxine (SYNTHROID) 125 MCG tablet Take 137 mcg by mouth Daily       aspirin 325 MG tablet Take 325 mg by mouth daily       atenolol (TENORMIN) 50 MG tablet Take 25 mg by mouth daily Indications: High Blood Pressure       atorvastatin (LIPITOR) 20 MG tablet Take 40 mg by mouth nightly Indications: Changes in Cholesterol       Cholecalciferol (VITAMIN D3) 1000 UNITS TABS Take 1,000 Units by mouth daily       coenzyme Q10 100 MG CAPS capsule Take 100 mg by mouth daily       lisinopril (PRINIVIL;ZESTRIL) 20 MG tablet Take 20 mg by mouth daily       nitroGLYCERIN (NITROSTAT) 0.4 MG SL tablet Place 0.4 mg under the tongue every 5 minutes as needed        No current facility-administered medications for this visit.       Allergies: Pollen extract  Past Medical History:   Diagnosis Date    AAA (abdominal aortic aneurysm) without rupture (HCC)     Arthritis     Atheroscler of native artery of both legs with intermit claudication ([facility] [address]     BPH without obstruction/lower urinary tract symptoms     CAD in native artery     sees dr. Jak Grant Carotid artery stenosis     History of blood transfusion     Hyperlipidemia     Hypertension     Hyperthyroidism     Iron deficiency  Ovarian Cancer Mother     Breast Cancer Sister      Social History     Tobacco Use    Smoking status: Light Tobacco Smoker     Packs/day: 0.25     Years: 50.00     Pack years: 12.50     Last attempt to quit: 4/10/2017     Years since quitting: 3.8    Smokeless tobacco: Never Used    Tobacco comment: Pt is trying to quit   Substance Use Topics    Alcohol use: Yes     Alcohol/week: 2.0 standard drinks     Types: 2 Cans of beer per week     Comment: 2 beers in the evening       ROS  Eyes  no sudden vision change or amaurosis. Respiratory  no significant shortness of breath,  Cardiovascular  no chest pain or syncope. No  significant leg swelling. No claudication. Musculoskeletal  no gait disturbance  Skin  no new wound. Neurologic   No speech difficulty or lateralizing weakness. All other review of systems are negative. No flowsheet data found. Physical Exam    Due to this being a TeleHealth encounter, evaluation of the following organ systems is limited: Vitals/Constitutional/EENT/Resp/CV/GI//MS/Neuro/Skin/Heme-Lymph-Imm. Constitutional  well developed, well nourished. No diaphoresis or acute distress. Neck- ROM appears normal  Extremities -No cyanosis, clubbing, no edema. No signs atheroembolic event. Pulmonary  effort appears normal.  No respiratory distress. No accessory muscle use  Neurologic  alert and oriented X 3. Cranial Nerves II-XII grossly intact  Skin  intact. No rash, erythema, or pallor. Psychiatric  mood, affect, and behavior appear normal.  Judgment and thought processes appear normal.    CT -   A stable CT scan of the abdomen and pelvis. A stable aneurysmal dilatation of the distal abdominal aorta. The   atheromatous changes of the abdominal aorta and iliac arteries. The diverticulosis of the colon. No evidence for diverticulitis. A stable small pleural-based nodule at the right lung base.  No change Lung nodule is being followed at [place] and he has ct scheduled in next couple weeks  Copy of ct to Dr. Fernando Stockton  This aneurysm has increased since the last visit. Individual films reviewed:  Yes. These results have been reviewed with the patient. Disease process is chronic illness with exacerbation or progression    Lower extremity arterial study: Right KARUNA 0.69, Left KARUNA 0.73. Individual films reviewed: Yes. These results were reviewed with the patient. Disease process is chronic illness with exacerbation or progression    Doppler results:    Right CCA/ICA <50% stenotic  Left CCA/ICA <50% stenotic  Right verterbral artery flow is antegrade  Left verterbral artery flow is antegrade  Individual velocities reviewed: Yes. Results were reviewed with the patient. Disease process is stable and chronic        Reviewed previous studies including: nora and CT scan  Individual images were reviewed. I agree with the findings  Results were discussed with the patient. ASSESSMENT/PLAN:  1. Bilateral carotid artery stenosis  2. Atherosclerosis of native artery of both lower extremities with intermittent claudication (HCC)  -     VL LOWER EXTREMITY ARTERIAL SEGMENTAL PRESSURES W PPG; Future  3.  AAA (abdominal aortic aneurysm) without rupture (HCC)  -     CT ABDOMEN PELVIS WO CONTRAST Additional Contrast? None; Future        Discussed management of carotid u/s and nora which includes:  continue asa, plavix and lipitor to reduce risk of TIA/CVA, to maintain patency of stents, to reduce risk of arterial thrombosis and to decrease rate of plaque buildup   Strongly encourage start/continue statin therapy -  Recommend no smoking  Proceed with 6 months with nora and ct Symptoms of rupture reviewed with the patient including sudden onset severe back pain or abdominal pain. This pain can sometimes radiate into the groin or leg. The patient may experience a feeling of impending doom or death. If this occurs they have been insturcted to call 911 and get to the emergency room telling them you have an aneurysm. Patient has voiced understanding. Crystal Barth is a 77 y.o. male being evaluated by a Virtual Visit (video visit) encounter to address concerns as mentioned above. A caregiver was present when appropriate. Due to this being a TeleHealth encounter (During CPMIT-54 public health emergency), evaluation of the following organ systems was limited: Vitals/Constitutional/EENT/Resp/CV/GI//MS/Neuro/Skin/Heme-Lymph-Imm. Pursuant to the emergency declaration under the [address] [place] [address], [address] [place] [address] authority and the [employer] and [employer] Act, this Virtual Visit was conducted with patient's (and/or legal guardian's) consent, to reduce the patient's risk of exposure to COVID-19 and provide necessary medical care. The patient (and/or legal guardian) has also been advised to contact this office for worsening conditions or problems, and seek emergency medical treatment and/or call 911 if deemed necessary. Patient identification was verified at the start of the visit: Yes      Services were provided through a video synchronous discussion virtually to substitute for in-person clinic visit. Patient and provider were located at their individual homes. An electronic signature was used to authenticate this note.     --ANANT Antunez

## 2021-04-07 RX ORDER — AMLODIPINE BESYLATE 10 MG/1
10 TABLET ORAL DAILY
Qty: 90 TABLET | Refills: 3 | Status: SHIPPED | OUTPATIENT
Start: 2021-04-07 | End: 2022-04-04

## 2021-07-21 ENCOUNTER — OFFICE VISIT (OUTPATIENT)
Dept: CARDIOLOGY | Facility: CLINIC | Age: 76
End: 2021-07-21

## 2021-07-21 VITALS
DIASTOLIC BLOOD PRESSURE: 64 MMHG | WEIGHT: 213 LBS | HEART RATE: 67 BPM | HEIGHT: 72 IN | OXYGEN SATURATION: 99 % | BODY MASS INDEX: 28.85 KG/M2 | SYSTOLIC BLOOD PRESSURE: 132 MMHG

## 2021-07-21 DIAGNOSIS — Z95.5 STENTED CORONARY ARTERY: ICD-10-CM

## 2021-07-21 DIAGNOSIS — R94.31 ABNORMAL ECG: ICD-10-CM

## 2021-07-21 DIAGNOSIS — I25.10 CORONARY ARTERY DISEASE INVOLVING NATIVE CORONARY ARTERY OF NATIVE HEART WITHOUT ANGINA PECTORIS: ICD-10-CM

## 2021-07-21 DIAGNOSIS — E78.2 MIXED HYPERLIPIDEMIA: ICD-10-CM

## 2021-07-21 DIAGNOSIS — I10 ESSENTIAL HYPERTENSION: ICD-10-CM

## 2021-07-21 DIAGNOSIS — I35.0 MILD AORTIC STENOSIS: Primary | ICD-10-CM

## 2021-07-21 PROCEDURE — 99214 OFFICE O/P EST MOD 30 MIN: CPT | Performed by: INTERNAL MEDICINE

## 2021-07-21 PROCEDURE — 93000 ELECTROCARDIOGRAM COMPLETE: CPT | Performed by: INTERNAL MEDICINE

## 2021-07-21 NOTE — PROGRESS NOTES
Robin Herrera  [id number]  1945  75 y.o.  male    Referring Provider: Wilber Rivera Jr., MD    Reason for Follow-up Visit:  Routine follow up.   Prior visit preoperative cardiovascular clearance for abdominal aortic aneurysm   currently being monitored by Dr Ty and no plans for any immediate repair  Prior SVT      Subjective    No new events or complaints since last visit   Overall the patient feels no major change from baseline symptoms   Overall feels the same     BP well controlled at home.     No bleeding, excessive bruising, gait instability or fall risks      Tolerating current medications well with no untoward side effects   Compliant with prescribed medication regimen. Tries to adhere to cardiac diet.   No chest pain or shortness of breath     No palpitations  Dependent edema worse on sitting up towards evening     Compliant with medications and dietary advice  Good effort tolerance    No presyncope or syncope  Compliant with medications    Joint pain in small, medium and large joints   Chronic low back pain    Fairly active    Abnormal ECG           History of present illness:  Robin Herrera is a 75 y.o. yo male with history of carotid stenosis who presents today for   Chief Complaint   Patient presents with   • Coronary Artery Disease     6 mo follow up   .    History  Past Medical History:   Diagnosis Date   • AAA (abdominal aortic aneurysm) (CMS/HCC)    • Atrial fibrillation (CMS/HCC)    • CAD in native artery    • Cancer (CMS/HCC) 2014    prostate treated with prostatectomy    • Disease of thyroid gland    • Headache    • History of transfusion    • Hyperlipidemia    • Hypertension    ,   Past Surgical History:   Procedure Laterality Date   • CARDIAC CATHETERIZATION     • CAROTID STENT     • CAROTID STENT      Right    • COLONOSCOPY  06/04/2014    diverticulosis otherwise normal colonoscopy without evidence of recurrence of polyps   • COLONOSCOPY N/A 7/16/2019    Procedure: COLONOSCOPY  WITH ANESTHESIA;  Surgeon: Shivam Nielsen DO;  Location: [facility] ENDOSCOPY;  Service: Gastroenterology   • CORONARY STENT PLACEMENT      x 1 - 2010   • EYE SURGERY Bilateral     cataracts    • PROSTATECTOMY     • TONSILLECTOMY     • TONSILLECTOMY     • VEIN SURGERY      vascular / Dr. Ty   ,   Family History   Problem Relation Age of Onset   • Alzheimer's disease Father    • Kidney failure Father    • Stroke Father    • Coronary artery disease Neg Hx    • Colon cancer Neg Hx    • Colon polyps Neg Hx    • Esophageal cancer Neg Hx    ,   Social History     Tobacco Use   • Smoking status: Current Every Day Smoker     Packs/day: 0.50     Years: 50.00     Pack years: 25.00     Types: Cigarettes   • Smokeless tobacco: Never Used   Substance Use Topics   • Alcohol use: Yes     Comment: daily   • Drug use: No   ,     Medications  Current Outpatient Medications   Medication Sig Dispense Refill   • amLODIPine (NORVASC) 10 MG tablet TAKE 1 TABLET BY MOUTH DAILY. 90 tablet 3   • aspirin 325 MG tablet Take 325 mg by mouth daily.     • atenolol (TENORMIN) 25 MG tablet Take 1 tablet by mouth Daily. 30 tablet 11   • atorvastatin (LIPITOR) 40 MG tablet Take 1 tablet by mouth Daily. (Patient taking differently: Take 80 mg by mouth Daily.) 30 tablet 11   • cholecalciferol (VITAMIN D3) 1000 UNITS tablet Take 1,000 Units by mouth daily.     • clopidogrel (PLAVIX) 75 MG tablet Take 1 tablet by mouth Daily. 90 tablet 4   • coenzyme Q10 100 MG capsule Take 100 mg by mouth daily.     • ferrous sulfate 325 (65 FE) MG tablet TAKE ONE TABLET BY MOUTH TWICE DAILY     • levothyroxine (SYNTHROID, LEVOTHROID) 137 MCG tablet Take 137 mcg by mouth Daily.     • lisinopril (PRINIVIL,ZESTRIL) 20 MG tablet Take 1 tablet by mouth Daily. 30 tablet 11   • Multiple Vitamins-Minerals (MULTIVITAMIN ADULT PO) Take  by mouth.     • nicotine polacrilex (NICORETTE) 4 MG gum Chew 4 mg As Needed for smoking cessation.     • nitroglycerin (NITROSTAT) 0.4 MG  "SL tablet Place 0.4 mg under the tongue every 5 (five) minutes as needed for chest pain. Take no more than 3 doses in 15 minutes.     • PrednisoLONE 5 MG tablet Take  by mouth.       No current facility-administered medications for this visit.       Allergies:  Pollen extract    Review of Systems  Review of Systems   Constitutional: Positive for malaise/fatigue.   HENT: Negative.    Eyes: Negative.    Cardiovascular: Positive for claudication. Negative for chest pain, cyanosis, dyspnea on exertion, irregular heartbeat, leg swelling, near-syncope, orthopnea, palpitations, paroxysmal nocturnal dyspnea and syncope.   Respiratory: Negative.    Endocrine: Negative.    Hematologic/Lymphatic: Negative.    Skin: Negative.    Musculoskeletal: Positive for arthritis.   Gastrointestinal: Negative for anorexia.   Genitourinary: Negative.    Neurological: Negative.    Psychiatric/Behavioral: Negative.        Objective     Physical Exam:  /64   Pulse 67   Ht 182.9 cm (72\")   Wt 96.6 kg (213 lb)   SpO2 99%   BMI 28.89 kg/m²   Physical Exam   Constitutional: He appears well-developed.   HENT:   Head: Normocephalic.   Neck: Normal carotid pulses and no JVD present. No tracheal tenderness present. Carotid bruit is not present. No tracheal deviation present.   Cardiovascular: Regular rhythm and normal pulses.   Murmur heard.   Systolic murmur is present with a grade of 2/6.  Pulmonary/Chest: Effort normal. No stridor.   Abdominal: Soft. He exhibits no distension. There is no abdominal tenderness.   Musculoskeletal:      Right lower le+ Pitting Edema present.      Left lower le+ Pitting Edema present.   Neurological: He is alert. No cranial nerve deficit or sensory deficit.   Skin: Skin is warm.   Psychiatric: His speech is normal and behavior is normal.       Results Review:      Results for orders placed during the hospital encounter of 20    Adult Transthoracic Echo Complete W/ Cont if Necessary Per " Protocol    Interpretation Summary  · Estimated EF = 66%.  · Left ventricular systolic function is normal.  · Mild aortic valve stenosis is present.  · Mild dilation of the aortic root is present.  · Mild aortic valve regurgitation is present.  · Abnormal global Longitudinal LV strain = -14.6%.            ECG 12 Lead    Date/Time: 7/21/2021 10:10 AM  Performed by: Rocky Mendoza MD  Authorized by: Rocky Mendoza MD   Comparison: compared with previous ECG from 1/21/2021  Similar to previous ECG  Rhythm: sinus rhythm  Rate: normal  Conduction: 1st degree AV block  ST Segments: ST segments normal  T Waves: T waves normal  QRS axis: normal  Other: no other findings    Clinical impression: abnormal EKG            Assessment/Plan   Patient Active Problem List   Diagnosis   • Hyperlipemia   • Essential hypertension   • Coronary artery disease involving native coronary artery of native heart without angina pectoris   • Stented coronary artery   • History of adenomatous polyp of colon   • Abnormal ECG first degree AV block    • Mild aortic stenosis     Results for orders placed during the hospital encounter of 09/16/16   Adult Stress Echo With Color & Doppler    Narrative [facility] - CARDIOLOGY STRESS ECHO    PATIENT:       DAYDAY CONN  MRN:           [id number]  VISIT ID:      [id number]  CATH DR:       Rocky Mendoza*  DATE:          09/16/2016  ROOM:            ______________________________________________________________________________      IMPRESSION:  1. Ejection fraction of 70%.   2. No areas of ischemia or scar.                 Plan    Repeat echo in future as has aortic stenosis   Orders Placed This Encounter   Procedures   • ECG 12 Lead     This order was created via procedure documentation     Order Specific Question:   Release to patient     Answer:   Immediate   • Adult Transthoracic Echo Complete w/ Color, Spectral and Contrast if necessary per protocol     Myocardial strain to be performed during  echocardiogram as long as technically feasible  [facility] per patient request     Standing Status:   Future     Standing Expiration Date:   7/21/2022     Order Specific Question:   Reason for exam?     Answer:   Murmur or Click     Order Specific Question:   Release to patient     Answer:   Immediate        The current medical regimen is effective;  continue present plan and medications.     Check BP and heart rates twice daily at least 3x / week, week a month  at home and bring a recording for me to review next visit  If BP >130/85 or < 100/60 persistently over 3 reading 30 mins apart call sooner      I support the patient's decision to take the Covid -19 vaccine   Had both dose   No major issues   Now fully immunized      Keeps legs elevated when able to and monitor BPs as well as weight frequently   If rapid weight gain of > 2 lbs/ day or 5 lbs per week to call for evaluation     Monitor for any signs of bleeding including red or dark stools as well as easy bruisabilty. Fall precautions.  S/L NTG PRN for chest pain, call me or go to ER if has to use S/L nitroglycerin           Return in about 6 months (around 1/21/2022).

## 2021-08-16 ENCOUNTER — TELEMEDICINE (OUTPATIENT)
Dept: UROLOGY | Age: 76
End: 2021-08-16

## 2021-08-16 ENCOUNTER — TELEMEDICINE (OUTPATIENT)
Dept: UROLOGY | Age: 76
End: 2021-08-16
Payer: MEDICARE

## 2021-08-16 DIAGNOSIS — C61 MALIGNANT NEOPLASM OF PROSTATE (HCC): Primary | ICD-10-CM

## 2021-08-16 PROCEDURE — 99213 OFFICE O/P EST LOW 20 MIN: CPT | Performed by: UROLOGY

## 2021-08-16 RX ORDER — FEBUXOSTAT 40 MG/1
TABLET, FILM COATED ORAL
COMMUNITY
Start: 2021-07-26

## 2021-08-16 NOTE — PROGRESS NOTES
Robina Castanon (:  ) is a 77 y.o. male,{New vs Established:[id number]::\"Established patient\"}, here for evaluation of the following chief complaint(s): Follow-up (I am here for my 6 month follow up for prostate cancer. I had my PSA done prior to this visit. )         ASSESSMENT/PLAN:  {There are no diagnoses linked to this encounter. (Refresh or delete this SmartLink)}    No follow-ups on file. SUBJECTIVE/OBJECTIVE:  HPI    Review of Systems    Patient-Reported Vitals 2021   Patient-Reported Weight 205lbs   Patient-Reported Height 6ft        Physical Exam    {Virtual visit PE with detailed exam Optional:[id number]}    {Time Documentation Optional:704322059}      Robina Castanon, was evaluated through a synchronous (real-time) audio-video encounter. The patient (or guardian if applicable) is aware that this is a billable service. Verbal consent to proceed has been obtained within the past 12 months. The visit was conducted pursuant to the emergency declaration under the [facility], [address] [place] [address] authority and the [employer] General Act. Patient identification was verified, and a caregiver was present when appropriate. The patient was located in a state where the provider was credentialed to provide care. An electronic signature was used to authenticate this note.     --Brian Ramos MA

## 2021-08-16 NOTE — PROGRESS NOTES
2021    TELEHEALTH EVALUATION -- Audio/Visual (During [id number] public health emergency)    HPI:    Reinaldo Jacobo (:  1945) has requested an audio/video evaluation for the following concern(s): A virtual video visit was performed today with the patient he was at home with his wife. The medical assistant called and reconciled and updated his chart. I was present in my office. We initially were on video real-time call because of technical difficulties we are unable to get back on the video once this was disconnected and we finished that visit by telephone but initially was on video. Prostate Cancer  Patient is here today for prostate cancer which was first diagnosed 9.5 years ago. His prostate cancer can be characterized as early stage organ confined, pathologic stage T2b Mariano 3+ 4 = 7 - margins. Patient had undetectable PSA until 2016 now there is very slight biochemical detection this is remained stable over the last 3 years but over the last year it slightly gone up. His last several PSA values are as follows:  Lab Results   Component Value Date    PSA 0.05 2021    PSA 0.04 2020    PSA 0.04 2020     Previous treatment of prostate cancer: Radical prostatectomy 2012  Lower urinary tract symptoms: Stress incontinence      Review of Systems   All other systems reviewed and are negative. Prior to Visit Medications    Medication Sig Taking?  Authorizing Provider   febuxostat (ULORIC) 40 MG TABS tablet TAKE ONE TABLET BY MOUTH EVERY DAY  Historical Provider, MD   ferrous sulfate 325 (65 Fe) MG tablet TAKE ONE TABLET BY MOUTH TWICE DAILY  Historical Provider, MD   amLODIPine (NORVASC) 10 MG tablet   Historical Provider, MD   nicotine polacrilex (NICORETTE) 4 MG gum 4 mg  Historical Provider, MD   clopidogrel (PLAVIX) 75 MG tablet Take 1 tablet by mouth daily  Christen Osullivan PA-C   Multiple Vitamins-Minerals (MULTI FOR HIM 50+ PO) Take 1 tablet by mouth daily Historical Provider, MD   levothyroxine (SYNTHROID) 125 MCG tablet Take 137 mcg by mouth Daily   Historical Provider, MD   aspirin 325 MG tablet Take 325 mg by mouth daily   Historical Provider, MD   atenolol (TENORMIN) 50 MG tablet Take 25 mg by mouth daily Indications: High Blood Pressure   Historical Provider, MD   atorvastatin (LIPITOR) 20 MG tablet Take 40 mg by mouth nightly Indications: Changes in Cholesterol   Historical Provider, MD   Cholecalciferol (VITAMIN D3) 1000 UNITS TABS Take 1,000 Units by mouth daily   Historical Provider, MD   coenzyme Q10 100 MG CAPS capsule Take 100 mg by mouth daily   Historical Provider, MD   lisinopril (PRINIVIL;ZESTRIL) 20 MG tablet Take 20 mg by mouth daily   Historical Provider, MD   nitroGLYCERIN (NITROSTAT) 0.4 MG SL tablet Place 0.4 mg under the tongue every 5 minutes as needed   Patient not taking: Reported on 2021  Historical Provider, MD       Social History     Tobacco Use    Smoking status: Light Tobacco Smoker     Packs/day: 0.25     Years: 50.00     Pack years: 12.50     Last attempt to quit: 4/10/2017     Years since quittin.3    Smokeless tobacco: Never Used    Tobacco comment: Pt is trying to quit   Substance Use Topics    Alcohol use:  Yes     Alcohol/week: 2.0 standard drinks     Types: 2 Cans of beer per week     Comment: 2 beers in the evening    Drug use: No        Allergies   Allergen Reactions    Pollen Extract    ,   Past Medical History:   Diagnosis Date    AAA (abdominal aortic aneurysm) without rupture (HCC)     Arthritis     [facility]ros[facility] native artery of both [facility]     BPH without obstruction/lower urinary tract symptoms     CAD in native artery     sees dr. Maria Del Carmen Mccarty Carotid artery stenosis     History of blood transfusion     Hyperlipidemia     Hypertension     Hyperthyroidism     Iron deficiency     [facility]     surgery    Right BBB/left ant fasc block     per dr. Sravanthi Mai; awaiting e[facility]/office.  Sleep apnea     cpap used    TIA (transient ischemic attack)    ,   Past Surgical History:   Procedure Laterality Date    CAROTID ENDARTERECTOMY Left 2017    SJS. Left carotid endarterectomy with bovine pericardial catch. Left cervical carotid arteriograms.  CATARACT REMOVAL Bilateral     CORONARY ANGIOPLASTY WITH STENT PLACEMENT      dr. Nicolas Villasenor Left 2017    WOUND EXPLORATION FOR POST-OP BLEEDING performed by Lacey Leone MD at [place] [address] Left 2017    PERCUTANEOUS TRANSLUMINAL BALLOON ANGIOPLASTY OF LEFT COMMON AND EXTERNAL ILIAC ARTERIES AND RIGHT, EXTERNAL ILIAC ARTERY; LEFT COMMON FEMORAL ENDARTERECTOMY WITH BOVINE PATCH ANGIOPLASTY, SUPERFICIAL FEMORAL ENDARECTOMY WITH BOVINE PATCHING performed by Lacey Leone MD at [address] [place] [address] VASCULAR SURGERY  2017    Right CFA 5f-6f 90 cm sheath, Right EIA stent (Express 8x27), Arch aortogram, Right carotid/intracerebral arteriograms, Right distal ICA emboshield filter, Right ICA stent (xact 8-10x40), Right ICA stent balloon angioplasty 6x40 viatrac, Retrieval of emoshield filter, Right carotid/intracerebral arteriograms, Distal aortogram w/ bilateral iliofemoral arterigrams, Mynx right CFA-- Elan Orozco   [facility] VASCULAR SURGERY  2017    S. Right CFA 5f sheath,aortogram with bilateral lower arteriogram,mynx right CFA.  VASCULAR SURGERY  2017    SJS Percutaneous cannulation right common femoral artery with 5 Welsh glide sheath. Suprarenal abdominal aortogram with bilateral lower extremity arteriogram.Mynx closure right common femoral artery puncture site.     WRIST FRACTURE SURGERY Right    ,   Social History     Tobacco Use    Smoking status: Light Tobacco Smoker     Packs/day: 0.25     Years: 50.00     Pack years: 12.50     Last attempt to quit: 4/10/2017     Years since quittin.3    Smokeless tobacco: Never Used    Tobacco comment: Pt is trying to quit   Substance Use Topics    Alcohol use: Yes     Alcohol/week: 2.0 standard drinks     Types: 2 Cans of beer per week     Comment: 2 beers in the evening    Drug use: No   ,   Family History   Problem Relation Age of Onset    Kidney Disease Father     Dementia Father         alzheimers    Ovarian Cancer Mother     Breast Cancer Sister        PHYSICAL EXAMINATION:  [ INSTRUCTIONS:  \"[x]\" Indicates a positive item  \"[]\" Indicates a negative item  -- DELETE ALL ITEMS NOT EXAMINED]  Vital Signs: (As obtained by patient/caregiver or practitioner observation)    Blood pressure-  Heart rate-    Respiratory rate-    Temperature-  Pulse oximetry-     Constitutional: [x] Appears well-developed and well-nourished [] No apparent distress      [] Abnormal-   Mental status  [] Alert and awake  [] Oriented to person/place/time []Able to follow commands      Eyes:  EOM    []  Normal  [] Abnormal-  Sclera  []  Normal  [] Abnormal -         Discharge []  None visible  [] Abnormal -    HENT:   [] Normocephalic, atraumatic. [] Abnormal   [] Mouth/Throat: Mucous membranes are moist.     External Ears [] Normal  [] Abnormal-     Neck: [] No visualized mass     Pulmonary/Chest: [] Respiratory effort normal.  [] No visualized signs of difficulty breathing or respiratory distress        [] Abnormal-      Musculoskeletal:   [] Normal gait with no signs of ataxia         [] Normal range of motion of neck        [] Abnormal-       Neurological:        [] No Facial Asymmetry (Cranial nerve 7 motor function) (limited exam to video visit)          [] No gaze palsy        [] Abnormal-         Skin:        [] No significant exanthematous lesions or discoloration noted on facial skin         [] Abnormal-            Psychiatric:       [] Normal Affect [] No Hallucinations        [] Abnormal-     Other pertinent observable physical exam findings-     ASSESSMENT/PLAN:  1. Malignant neoplasm of prostate ([facility]  PSA remains very low but has picked up slightly over the last 2 years I recommend that we continue to monitor this. We did discuss should reach a threshold greater than 0.2 considering local radiation therapy. Return in about 6 months (around 2/16/2022) for PSA prior to vext visit. Lynette Snow, was evaluated through a synchronous (real-time) audio-video encounter. The patient (or guardian if applicable) is aware that this is a billable service. Verbal consent to proceed has been obtained within the past 12 months. The visit was conducted pursuant to the emergency declaration under the [facility], [address] [place] [address] authority and the [employer] General Act. Patient identification was verified, and a caregiver was present when appropriate. The patient was located in a state where the provider was credentialed to provide care. Total time spent on this encounter: 21    --Av Baxter MD on 8/16/2021 at 10:16 AM    An electronic signature was used to authenticate this note.

## 2021-08-16 NOTE — PROGRESS NOTES
[facility] ([facility]) virtual appointment billing disclaimer was ready to patient prior to appointment. Patient confirmed understanding and agreed to proceed.

## 2021-08-16 NOTE — PROGRESS NOTES
Virtual appt billing disclaimer was read to patient. Patient confirmed understanding and agreed to proceed with visit.

## 2021-08-16 NOTE — PROGRESS NOTES
2021    TELEHEALTH EVALUATION -- Audio/Visual (During [id number] public health emergency)    HPI:    Fay Hand (:  1945) has requested an audio/video evaluation for the following concern(s): Follow-up on his PSA. He was at his home with his wife on the call I was present in my office. There was medical assistant who updated and reconciled his history medication list.  We initially were on a video call and loss connection call had to be rescheduled and we were not able to connect on video that we were initially on the video call. I concluded the visit with him over the telephone    Prostate Cancer  Patient is here today for prostate cancer which was first diagnosed 9.5 years ago. His prostate cancer can be characterized as early stage organ confined. Pathologic stage T2b, [place] 3+4 = 7. PSA was undetectable until 2016 and now has a very slight biochemical detection over the last 3 years. His last several PSA values are as follows:  Lab Results   Component Value Date    PSA 0.05 2021    PSA 0.04 2020    PSA 0.04 2020     Previous treatment of prostate cancer: Radical prostatectomy done 2012  Lower urinary tract symptoms: urine incontinence:  stress      Review of Systems    Prior to Visit Medications    Medication Sig Taking?  Authorizing Provider   febuxostat (ULORIC) 40 MG TABS tablet TAKE ONE TABLET BY MOUTH EVERY DAY Yes Historical Provider, MD   ferrous sulfate 325 (65 Fe) MG tablet TAKE ONE TABLET BY MOUTH TWICE DAILY Yes Historical Provider, MD   amLODIPine (NORVASC) 10 MG tablet  Yes Historical Provider, MD   nicotine polacrilex (NICORETTE) 4 MG gum 4 mg Yes Historical Provider, MD   clopidogrel (PLAVIX) 75 MG tablet Take 1 tablet by mouth daily Yes Christen Osullivan PA-C   Multiple Vitamins-Minerals (MULTI FOR HIM 50+ PO) Take 1 tablet by mouth daily  Yes Historical Provider, MD   levothyroxine (SYNTHROID) 125 MCG tablet Take 137 mcg by mouth Daily  Yes Historical Provider, MD   aspirin 325 MG tablet Take 325 mg by mouth daily  Yes Historical Provider, MD   atenolol (TENORMIN) 50 MG tablet Take 25 mg by mouth daily Indications: High Blood Pressure  Yes Historical Provider, MD   atorvastatin (LIPITOR) 20 MG tablet Take 40 mg by mouth nightly Indications: Changes in Cholesterol  Yes Historical Provider, MD   Cholecalciferol (VITAMIN D3) 1000 UNITS TABS Take 1,000 Units by mouth daily  Yes Historical Provider, MD   coenzyme Q10 100 MG CAPS capsule Take 100 mg by mouth daily  Yes Historical Provider, MD   lisinopril (PRINIVIL;ZESTRIL) 20 MG tablet Take 20 mg by mouth daily  Yes Historical Provider, MD   nitroGLYCERIN (NITROSTAT) 0.4 MG SL tablet Place 0.4 mg under the tongue every 5 minutes as needed   Patient not taking: Reported on 2021  Historical Provider, MD       Social History     Tobacco Use    Smoking status: Light Tobacco Smoker     Packs/day: 0.25     Years: 50.00     Pack years: 12.50     Last attempt to quit: 4/10/2017     Years since quittin.3    Smokeless tobacco: Never Used    Tobacco comment: Pt is trying to quit   Substance Use Topics    Alcohol use: Yes     Alcohol/week: 2.0 standard drinks     Types: 2 Cans of beer per week     Comment: 2 beers in the evening    Drug use: No        Allergies   Allergen Reactions    Pollen Extract    ,   Past Medical History:   Diagnosis Date    AAA (abdominal aortic aneurysm) without rupture (HCC)     Arthritis     Atheroscler of native artery of both legs with intermit claudication ([facility]     BPH without obstruction/lower urinary tract symptoms     CAD in native artery     sees dr. Malorie Casarez Carotid artery stenosis     History of blood transfusion     Hyperlipidemia     Hypertension     Hyperthyroidism     Iron deficiency     Malignant neoplasm prostate ([facility]     surgery    Right BBB/left ant fasc block     per dr. Sravanthi Mai; awaiting ekg from [facility]/office.     Sleep apnea     cpap used    TIA (transient ischemic attack)    ,   Past Surgical History:   Procedure Laterality Date    CAROTID ENDARTERECTOMY Left 2017    SJS. Left carotid endarterectomy with bovine pericardial catch. Left cervical carotid arteriograms.  CATARACT REMOVAL Bilateral     CORONARY ANGIOPLASTY WITH STENT PLACEMENT      dr. Kevin Cortez Left 2017    WOUND EXPLORATION FOR POST-OP BLEEDING performed by Alesia Leo MD at [place] [address] Left 2017    PERCUTANEOUS TRANSLUMINAL BALLOON ANGIOPLASTY OF LEFT COMMON AND EXTERNAL ILIAC ARTERIES AND RIGHT, EXTERNAL ILIAC ARTERY; LEFT COMMON FEMORAL ENDARTERECTOMY WITH BOVINE PATCH ANGIOPLASTY, SUPERFICIAL FEMORAL ENDARECTOMY WITH BOVINE PATCHING performed by Alesia Leo MD at [address] [place] [address] VASCULAR SURGERY  2017    Right CFA 5f-6f 90 cm sheath, Right EIA stent (Express 8x27), Arch aortogram, Right carotid/intracerebral arteriograms, Right distal ICA emboshield filter, Right ICA stent (xact 8-10x40), Right ICA stent balloon angioplasty 6x40 viatrac, Retrieval of emoshield filter, Right carotid/intracerebral arteriograms, Distal aortogram w/ bilateral iliofemoral arterigrams, Mynx right CFA-- [facility] Robles   [facility] VASCULAR SURGERY  2017    SJS. Right CFA 5f sheath,aortogram with bilateral lower arteriogram,mynx right CFA.  VASCULAR SURGERY  2017    SJS Percutaneous cannulation right common femoral artery with 5 Urdu glide sheath. Suprarenal abdominal aortogram with bilateral lower extremity arteriogram.Mynx closure right common femoral artery puncture site.     WRIST FRACTURE SURGERY Right    ,   Social History     Tobacco Use    Smoking status: Light Tobacco Smoker     Packs/day: 0.25     Years: 50.00     Pack years: 12.50     Last attempt to quit: 4/10/2017     Years since quittin.3    Smokeless tobacco: Never Used    Tobacco comment: Pt is trying to quit slightly up. We will continue to monitor. I told him that local radiation therapy would be an option with a prolonged doubling time assuming local recurrence but I think PSA can be monitored at this low level for now. He was in agreement      Return in about 6 months (around 2/16/2022) for PSA prior to vext visit. Dru Durand, was evaluated through a synchronous (real-time) audio-video encounter. The patient (or guardian if applicable) is aware that this is a billable service. Verbal consent to proceed has been obtained within the past 12 months. The visit was conducted pursuant to the emergency declaration under the [facility], [address] [place] [address] authority and the [employer] and [employer] General Act. Patient identification was verified, and a caregiver was present when appropriate. The patient was located in a state where the provider was credentialed to provide care. Total time spent on this encounter: 15 minutes    --Drake Valles MD on 8/16/2021 at 9:58 AM    An electronic signature was used to authenticate this note.

## 2021-08-16 NOTE — PROGRESS NOTES
Carl Waggoner (:  ) is a 77 y.o. male,{New vs Established:[id number]::\"Established patient\"}, here for evaluation of the following chief complaint(s): Follow-up (I am here for my 6 month follow up for prostate cancer. I had my PSA done prior to this visit.)         ASSESSMENT/PLAN:  {There are no diagnoses linked to this encounter. (Refresh or delete this SmartLink)}    No follow-ups on file. SUBJECTIVE/OBJECTIVE:  HPI    Review of Systems    Patient-Reported Vitals 2021   Patient-Reported Weight 205lbs   Patient-Reported Height 6ft        Physical Exam    {Virtual visit PE with detailed exam Optional:[id number]}    {Time Documentation Optional:885179189}      Carl Waggoner, was evaluated through a synchronous (real-time) audio-video encounter. The patient (or guardian if applicable) is aware that this is a billable service. Verbal consent to proceed has been obtained within the past 12 months. The visit was conducted pursuant to the emergency declaration under the [facility], [address] [place] [address] authority and the [employer] General Act. Patient identification was verified, and a caregiver was present when appropriate. The patient was located in a state where the provider was credentialed to provide care. An electronic signature was used to authenticate this note.     --Mahsa Rice MA

## 2021-08-25 ENCOUNTER — HOSPITAL ENCOUNTER (OUTPATIENT)
Dept: CT IMAGING | Age: 76
Discharge: HOME | End: 2021-08-25
Payer: MEDICARE

## 2021-08-25 ENCOUNTER — HOSPITAL ENCOUNTER (OUTPATIENT)
Dept: VASCULAR LAB | Age: 76
Discharge: HOME | End: 2021-08-25
Payer: MEDICARE

## 2021-08-25 DIAGNOSIS — I71.40 AAA (ABDOMINAL AORTIC ANEURYSM) WITHOUT RUPTURE: ICD-10-CM

## 2021-08-25 DIAGNOSIS — I70.213 ATHEROSCLEROSIS OF NATIVE ARTERY OF BOTH LOWER EXTREMITIES WITH INTERMITTENT CLAUDICATION (HCC): ICD-10-CM

## 2021-08-25 PROCEDURE — 93923 UPR/LXTR ART STDY 3+ LVLS: CPT

## 2021-08-25 PROCEDURE — 74176 CT ABD & PELVIS W/O CONTRAST: CPT

## 2021-08-25 RX ORDER — CLOPIDOGREL BISULFATE 75 MG/1
75 TABLET ORAL DAILY
Qty: 90 TABLET | Refills: 4 | Status: SHIPPED | OUTPATIENT
Start: 2021-08-25

## 2021-08-26 ENCOUNTER — VIRTUAL VISIT (OUTPATIENT)
Dept: VASCULAR SURGERY | Age: 76
End: 2021-08-26
Payer: MEDICARE

## 2021-08-26 DIAGNOSIS — I65.23 BILATERAL CAROTID ARTERY STENOSIS: Primary | ICD-10-CM

## 2021-08-26 DIAGNOSIS — I71.40 AAA (ABDOMINAL AORTIC ANEURYSM) WITHOUT RUPTURE: ICD-10-CM

## 2021-08-26 DIAGNOSIS — I70.213 ATHEROSCLEROSIS OF NATIVE ARTERY OF BOTH LOWER EXTREMITIES WITH INTERMITTENT CLAUDICATION (HCC): ICD-10-CM

## 2021-08-26 PROCEDURE — 99442 PR PHYS/QHP TELEPHONE EVALUATION 11-20 MIN: CPT | Performed by: NURSE PRACTITIONER

## 2021-08-26 RX ORDER — EZETIMIBE 10 MG/1
10 TABLET ORAL DAILY
COMMUNITY

## 2021-08-26 NOTE — PROGRESS NOTES
Asia Cook is a 77 y.o. male evaluated via telephone on 2021. Asia Cook (:  ) is a 77 y.o. male,Established patient, here for evaluation of the following chief complaint(s):     Consent:  He and/or health care decision maker is aware that that he may receive a bill for this telephone service, depending on his insurance coverage, and has provided verbal consent to proceed: Yes    Patient is located at home  Provider is located at [facility]   Also present during call is no one    Shawn Roth has a history of peripheral vascular disease of the lower extremities. He has had this for 1 - 5 years. Current treatment includes clopidogrel 75 mg po qd, ASA EC daily. Shawn Roth has not had new wounds. Recently, he reports claudication at a distance of  1/4 miles. Shawn Roth reports that the left leg is more significant than the right. He reports claudication is not changed and is mostly in the form of crampy type pain starting in the calves. He has a short recovery time. This is reproducible in nature. He reports ischemic rest pain 0 times per night. He reports walking with cart does not help. He has a known history of abdominal aortic aneurysm for 1 - 5 years. He has not had abdominal pain. He has not had back pain in the cervical spine, thoracic spine, lumbar spine and sacral spine region. This pain is unchanged since the last visit. Pain is rated as 0.        Asia Cook is a 77 y.o. male with the following history reviewed and recorded in [facility]:  Patient Active Problem List    Diagnosis Date Noted    Memory loss 2017    Abnormal brain scan 2017    Carotid disease, bilateral ([facility] 2017    Paroxysmal atrial fibrillation ([facility] 04/15/2017    Coronary artery disease involving native coronary artery of native heart without angina pectoris      sees dr. Kendrick Ascencio Hyperlipidemia     Hyperthyroidism     Right BBB/left ant fasc block      per dr. Jeff Carlson; [facility] from [facility]/office.  Essential hypertension     Sleep apnea      cpap used      AAA (abdominal aortic aneurysm) without rupture (HCC)     Atherosclerosis of native artery of both lower extremities with intermittent claudication (HCC)     Bilateral carotid artery stenosis     [facility] 10/05/2016    Presence of stent in coronary artery 09/27/2016     Current Outpatient Medications   Medication Sig Dispense Refill    ezetimibe (ZETIA) 10 MG tablet Take 10 mg by mouth daily      febuxostat (ULORIC) 40 MG TABS tablet TAKE ONE TABLET BY MOUTH EVERY DAY      ferrous sulfate 325 (65 Fe) MG tablet TAKE ONE TABLET BY MOUTH TWICE DAILY      amLODIPine (NORVASC) 10 MG tablet   2    nicotine polacrilex (NICORETTE) 4 MG gum 4 mg      clopidogrel (PLAVIX) 75 MG tablet Take 1 tablet by mouth daily 30 tablet 0    Multiple Vitamins-Minerals (MULTI FOR HIM 50+ PO) Take 1 tablet by mouth daily       levothyroxine (SYNTHROID) 125 MCG tablet Take 137 mcg by mouth Daily       aspirin 325 MG tablet Take 325 mg by mouth daily       atenolol (TENORMIN) 50 MG tablet Take 25 mg by mouth daily Indications: High Blood Pressure       atorvastatin (LIPITOR) 20 MG tablet Take 40 mg by mouth nightly Indications: Changes in Cholesterol       Cholecalciferol (VITAMIN D3) 1000 UNITS TABS Take 1,000 Units by mouth daily       coenzyme Q10 100 MG CAPS capsule Take 100 mg by mouth daily       lisinopril (PRINIVIL;ZESTRIL) 20 MG tablet Take 20 mg by mouth daily       nitroGLYCERIN (NITROSTAT) 0.4 MG SL tablet Place 0.4 mg under the tongue every 5 minutes as needed  (Patient not taking: Reported on 8/16/2021)       No current facility-administered medications for this visit.      Allergies: Pollen extract  Past Medical History:   Diagnosis Date    AAA (abdominal aortic aneurysm) without rupture (HCC)     Arthritis     Atheroscler of native artery of both legs with intermit claudication ([facility] abdominal aorta and iliac   arteries. A long segment fusiform aneurysmal dilatation of the   infrarenal abdominal aorta with a mild increase in diameter since the   previous study. Bilateral iliac artery stent in place. The patency could not be   confirmed. The diverticulosis of the colon. No evidence for diverticulitis. Individual images were reviewed. Results were reviewed with the patient. Assessment    1. Bilateral carotid artery stenosis    2. AAA (abdominal aortic aneurysm) without rupture (HCC)    3. Atherosclerosis of native artery of both lower extremities with intermittent claudication ([address]          Plan    1. Bilateral carotid artery stenosis  2. AAA (abdominal aortic aneurysm) without rupture (HCC)  3. Atherosclerosis of native artery of both lower extremities with intermittent claudication ([address]    Patient instructed to walk as much as possible. Call our office with any progressive pain in leg(s) or hip(s) with walking. Take good care of your feet. Let us know right away if you develop a wound on your foot. Symptoms of rupture reviewed with the patient including sudden onset severe back pain or abdominal pain. This pain can sometimes radiate into the groin or leg. The patient may experience a feeling of impending doom or death. If this occurs they have been insturcted to call 911 and get to the emergency room telling them you have an aneurysm. Patient has voiced understanding. 6 months with nora and ct, and cvls  Continue plavix, lipitor  Strongly encouraged start/continue statin therapy  Recommended no smoking        Documentation:  I communicated with the patient and/or health care decision maker about pvd, cvd, aaa.    Details of this discussion including any medical advice provided: as above      I affirm this is a Patient Initiated Episode with a Patient who has not had a related appointment within my department in the past 7 days or scheduled within the next 24 hours. Patient identification was verified at the start of the visit: Yes    Total Time: minutes: 11-20 minutes    The visit was conducted pursuant to the emergency declaration under the [address] [place] [address] and the [employer] General Act. Patient identification was verified, and a caregiver was present when appropriate. The patient was located in a state where the provider was credentialed to provide care.     Note: not billable if this call serves to triage the patient into an appointment for the relevant concern      ANANT Landry

## 2022-02-14 NOTE — PROGRESS NOTES
Spoke to patient, will schedule CT scan. Does patient needs colonoscopy, last one was on 03/05/21.  Patient asking if he needs any medication for his positive c diff and e coli since her daughter is an RN and told patient to ask.    Please review/advise   8/16/2017    PERCUTANEOUS TRANSLUMINAL BALLOON ANGIOPLASTY OF LEFT COMMON AND EXTERNAL ILIAC ARTERIES AND RIGHT, EXTERNAL ILIAC ARTERY; LEFT COMMON FEMORAL ENDARTERECTOMY WITH BOVINE PATCH ANGIOPLASTY, SUPERFICIAL FEMORAL ENDARECTOMY WITH BOVINE PATCHING performed by Akosua Mills MD at [address] [facility] VASCULAR SURGERY  05/22/2017    Right CFA 5f-6f 90 cm sheath, Right EIA stent (Express 8x27), Arch aortogram, Right carotid/intracerebral arteriograms, Right distal ICA emboshield filter, Right ICA stent (xact 8-10x40), Right ICA stent balloon angioplasty 6x40 viatrac, Retrieval of emoshield filter, Right carotid/intracerebral arteriograms, Distal aortogram w/ bilateral iliofemoral arterigrams, Mynx right CFA-- [facility] VASCULAR SURGERY  07/25/2017    SJS. Right CFA 5f sheath,aortogram with bilateral lower arteriogram,mynx right CFA.  VASCULAR SURGERY  08/09/2017    SJS Percutaneous cannulation right common femoral artery with 5 Malay glide sheath. Suprarenal abdominal aortogram with bilateral lower extremity arteriogram.Mynx closure right common femoral artery puncture site.     WRIST FRACTURE SURGERY Right        Current Outpatient Prescriptions   Medication Sig Dispense Refill    ferrous sulfate 325 (65 Fe) MG tablet TAKE ONE TABLET BY MOUTH TWICE DAILY  2    nicotine polacrilex (NICORETTE) 4 MG gum 4 mg      prednisoLONE 5 MG TABS Take by mouth      clopidogrel (PLAVIX) 75 MG tablet Take 1 tablet by mouth daily 30 tablet 0    Multiple Vitamins-Minerals (MULTI FOR HIM 50+ PO) Take 1 tablet by mouth daily       levothyroxine (SYNTHROID) 125 MCG tablet Take 125 mcg by mouth Daily Indications: Underactive Thyroid      amLODIPine (NORVASC) 5 MG tablet Take 5 mg by mouth daily Indications: High Blood Pressure       aspirin 325 MG tablet Take 325 mg by mouth daily       atenolol (TENORMIN) 50 MG tablet Take 25 mg by mouth daily Indications: High Blood Bruises/bleeds easily. Psychiatric/Behavioral: The patient does not have insomnia. PHYSICAL EXAM:  BP (!) 151/79   Pulse 65   Temp 98.1 °F (36.7 °C) (Temporal)   Ht 6' (1.829 m)   Wt 217 lb (98.4 kg)   BMI 29.43 kg/m²   Physical Exam   Constitutional: He is oriented to person, place, and time. He appears well-developed and well-nourished. No distress. HENT:   Head: Normocephalic and atraumatic. Nose: Nose normal.   Eyes: Conjunctivae and EOM are normal. Pupils are equal, round, and reactive to light. No scleral icterus. Neck: Normal range of motion. Neck supple. No tracheal deviation present. Cardiovascular: Normal rate, regular rhythm and intact distal pulses. Pulmonary/Chest: Effort normal and breath sounds normal. No stridor. He exhibits no tenderness. Abdominal: Soft. Bowel sounds are normal. He exhibits no distension and no mass. There is no tenderness. Lower midline abdominal scar   Musculoskeletal: Normal range of motion. He exhibits no edema or tenderness. Lymphadenopathy:     He has no cervical adenopathy. Neurological: He is alert and oriented to person, place, and time. Skin: Skin is warm and dry. No erythema. Psychiatric: He has a normal mood and affect.  His behavior is normal. Judgment normal.           DATA:  CMP:    Lab Results   Component Value Date     08/17/2017    K 4.5 08/17/2017     08/17/2017    CO2 24 08/17/2017    BUN 27 08/17/2017    CREATININE 1.6 08/17/2017    LABGLOM 43 08/17/2017    GLUCOSE 141 08/17/2017    PROT 6.9 04/15/2017    LABALBU 4.2 04/15/2017    CALCIUM 8.8 08/17/2017    BILITOT 0.5 04/15/2017    ALKPHOS 74 04/15/2017    AST 30 04/15/2017    ALT 22 04/15/2017     Results for orders placed or performed in visit on 11/06/17   POC URINE with Microscopic   Result Value Ref Range    Color, UA      Clarity, UA  Clear    Glucose, Ur neg     Bilirubin Urine 0 mg/dL    Ketones, Urine Negative     Specific Gravity, UA 1.020 1.005 - 1.030 Blood, Urine Positive     pH, UA 5.5 4.5 - 8.0    Protein, UA Negative Negative    Nitrite, Urine Negative     Leukocytes, UA Neg     Urobilinogen, Urine Normal     rbc urine, poc      wbc urine, poc      bacteria urine, poc      yeast urine, poc      casts urine, poc      epi cells urine, poc      crystals urine, poc       Lab Results   Component Value Date    PSA 0.02 11/01/2017    PSA 0.01 09/28/2016         1. Malignant neoplasm prostate (HCC)  PSA is slightly up. I recommend continue watching this  - POC URINE with Microscopic  - PSA, Diagnostic; Future      Orders Placed This Encounter   Procedures    PSA, Diagnostic     1yr     Standing Status:   Future     Standing Expiration Date:   11/6/2019    POC URINE with Microscopic        Return in about 1 year (around 11/6/2018) for PSA prior to vext visit.

## 2022-02-15 DIAGNOSIS — C61 MALIGNANT NEOPLASM OF PROSTATE (HCC): ICD-10-CM

## 2022-02-15 LAB — PROSTATE SPECIFIC ANTIGEN: 0.05 NG/ML (ref 0–4)

## 2022-02-18 ENCOUNTER — OFFICE VISIT (OUTPATIENT)
Dept: UROLOGY | Age: 77
End: 2022-02-18
Payer: MEDICARE

## 2022-02-18 VITALS — HEIGHT: 71 IN | BODY MASS INDEX: 30.8 KG/M2 | TEMPERATURE: 98.1 F | WEIGHT: 220 LBS

## 2022-02-18 DIAGNOSIS — C61 MALIGNANT NEOPLASM OF PROSTATE (HCC): Primary | ICD-10-CM

## 2022-02-18 LAB
APPEARANCE FLUID: CLEAR
BILIRUBIN, POC: NORMAL
BLOOD URINE, POC: NORMAL
CLARITY, POC: CLEAR
COLOR, POC: YELLOW
GLUCOSE URINE, POC: NORMAL
KETONES, POC: NORMAL
LEUKOCYTE EST, POC: NORMAL
NITRITE, POC: NORMAL
PH, POC: 5.5
PROTEIN, POC: NORMAL
SPECIFIC GRAVITY, POC: 1.02
UROBILINOGEN, POC: 0.2

## 2022-02-18 PROCEDURE — G8417 CALC BMI ABV UP PARAM F/U: HCPCS | Performed by: UROLOGY

## 2022-02-18 PROCEDURE — 3017F COLORECTAL CA SCREEN DOC REV: CPT | Performed by: UROLOGY

## 2022-02-18 PROCEDURE — 1123F ACP DISCUSS/DSCN MKR DOCD: CPT | Performed by: UROLOGY

## 2022-02-18 PROCEDURE — G8484 FLU IMMUNIZE NO ADMIN: HCPCS | Performed by: UROLOGY

## 2022-02-18 PROCEDURE — 4004F PT TOBACCO SCREEN RCVD TLK: CPT | Performed by: UROLOGY

## 2022-02-18 PROCEDURE — G8427 DOCREV CUR MEDS BY ELIG CLIN: HCPCS | Performed by: UROLOGY

## 2022-02-18 PROCEDURE — 81002 URINALYSIS NONAUTO W/O SCOPE: CPT | Performed by: UROLOGY

## 2022-02-18 PROCEDURE — 99213 OFFICE O/P EST LOW 20 MIN: CPT | Performed by: UROLOGY

## 2022-02-18 PROCEDURE — 4040F PNEUMOC VAC/ADMIN/RCVD: CPT | Performed by: UROLOGY

## 2022-02-18 ASSESSMENT — ENCOUNTER SYMPTOMS
CHEST TIGHTNESS: 0
BACK PAIN: 0
WHEEZING: 0
SORE THROAT: 0
EYE REDNESS: 0
NAUSEA: 0
FACIAL SWELLING: 0
VOMITING: 0
EYE DISCHARGE: 0

## 2022-02-18 NOTE — PROGRESS NOTES
Du Garcia is a 77 y.o. male who presents today   Chief Complaint   Patient presents with    Follow-up     I am here today for a 6 month FU. I had my PSA done prior. Prostate Cancer  Patient is here today for prostate cancer which was first diagnosed 10 years ago. His prostate cancer can be characterized as early-stage organ confined. Pathologic stage T2b [place] 3+4 = 7. PSA was undetectable until 2016 now he has a very low slight biochemical detection over the last 6 years with stable PSA on watchful waiting  His last several PSA values are as follows:  Lab Results   Component Value Date    PSA 0.05 02/15/2022    PSA 0.05 08/09/2021    PSA 0.04 12/14/2020    PSA 0.04 06/16/2020    PSA 0.03 12/09/2019     Previous treatment of prostate cancer: Radical retropubic prostatectomy done April 9, 2012  Lower urinary tract symptoms: He has some mild frequency decreased force of stream nocturia x1 he also has symptoms of stress incontinence. His AUA symptom score is 3 this is unchanged he is not bothered. Past Medical History:   Diagnosis Date    AAA (abdominal aortic aneurysm) without rupture (HCC)     Arthritis     Atheroscler of native artery of both legs with intermit claudication ([address]     BPH without obstruction/lower urinary tract symptoms     CAD in native artery     sees dr. Georgia Resendez Carotid artery stenosis     History of blood transfusion     Hyperlipidemia     Hypertension     Hyperthyroidism     Iron deficiency     Malignant neoplasm prostate ([address]     surgery    Right BBB/left ant fasc block     per dr. Emil Cleveland; awaiting ekg from wb/office.  Sleep apnea     cpap used    TIA (transient ischemic attack)        Past Surgical History:   Procedure Laterality Date    CAROTID ENDARTERECTOMY Left 4/12/2017    SJS. Left carotid endarterectomy with bovine pericardial catch. Left cervical carotid arteriograms.     CATARACT REMOVAL Bilateral     CORONARY ANGIOPLASTY WITH STENT PLACEMENT 2010    dr. Danish Almodovar Left 8/16/2017    WOUND EXPLORATION FOR POST-OP BLEEDING performed by Mike Joe MD at [place] [id number] Left 8/16/2017    PERCUTANEOUS TRANSLUMINAL BALLOON ANGIOPLASTY OF LEFT COMMON AND EXTERNAL ILIAC ARTERIES AND RIGHT, EXTERNAL ILIAC ARTERY; LEFT COMMON FEMORAL ENDARTERECTOMY WITH BOVINE PATCH ANGIOPLASTY, SUPERFICIAL FEMORAL ENDARECTOMY WITH BOVINE PATCHING performed by Mike Joe MD at [facility] VASCULAR SURGERY  05/22/2017    Right CFA 5f-6f 90 cm sheath, Right EIA stent (Express 8x27), Arch aortogram, Right carotid/intracerebral arteriograms, Right distal ICA emboshield filter, Right ICA stent (xact 8-10x40), Right ICA stent balloon angioplasty 6x40 viatrac, Retrieval of emoshield filter, Right carotid/intracerebral arteriograms, Distal aortogram w/ bilateral iliofemoral arterigrams, Mynx right CFA-- [facility] VASCULAR SURGERY  07/25/2017    SJS. Right CFA 5f sheath,aortogram with bilateral lower arteriogram,mynx right CFA.  VASCULAR SURGERY  08/09/2017    SJS Percutaneous cannulation right common femoral artery with 5 [place] glide sheath. Suprarenal abdominal aortogram with bilateral lower extremity arteriogram.Mynx closure right common femoral artery puncture site.     WRIST FRACTURE SURGERY Right        Current Outpatient Medications   Medication Sig Dispense Refill    ezetimibe (ZETIA) 10 MG tablet Take 10 mg by mouth daily      febuxostat (ULORIC) 40 MG TABS tablet TAKE ONE TABLET BY MOUTH EVERY DAY      amLODIPine (NORVASC) 10 MG tablet   2    nicotine polacrilex (NICORETTE) 4 MG gum 4 mg      clopidogrel (PLAVIX) 75 MG tablet Take 1 tablet by mouth daily 30 tablet 0    Multiple Vitamins-Minerals (MULTI FOR HIM 50+ PO) Take 1 tablet by mouth daily       levothyroxine (SYNTHROID) 125 MCG tablet Take 137 mcg by mouth Daily       aspirin 325 MG tablet Take 325 mg by mouth daily       atenolol (TENORMIN) 50 MG tablet Take 25 mg by mouth daily Indications: High Blood Pressure       atorvastatin (LIPITOR) 20 MG tablet Take 40 mg by mouth nightly Indications: Changes in Cholesterol       Cholecalciferol (VITAMIN D3) 1000 UNITS TABS Take 1,000 Units by mouth daily       coenzyme Q10 100 MG CAPS capsule Take 100 mg by mouth daily       lisinopril (PRINIVIL;ZESTRIL) 20 MG tablet Take 20 mg by mouth daily       nitroGLYCERIN (NITROSTAT) 0.4 MG SL tablet Place 0.4 mg under the tongue every 5 minutes as needed        No current facility-administered medications for this visit. Allergies   Allergen Reactions    Pollen Extract        Social History     Socioeconomic History    Marital status:      Spouse name: None    Number of children: None    Years of education: None    Highest education level: None   Occupational History    None   Tobacco Use    Smoking status: Light Tobacco Smoker     Packs/day: 0.25     Years: 50.00     Pack years: 12.50     Last attempt to quit: 4/10/2017     Years since quittin.8    Smokeless tobacco: Never Used    Tobacco comment: Pt is trying to quit   Substance and Sexual Activity    Alcohol use: Yes     Alcohol/week: 2.0 standard drinks     Types: 2 Cans of beer per week     Comment: 2 beers in the evening    Drug use: No    Sexual activity: None   Other Topics Concern    None   Social History Narrative    None     Social Determinants of Health     Financial Resource Strain:     Difficulty of Paying Living Expenses: Not on file   Food Insecurity:     Worried About Running Out of Food in the Last Year: Not on file    Nadeem of Food in the Last Year: Not on file   Transportation Needs:     Lack of Transportation (Medical): Not on file    Lack of Transportation (Non-Medical):  Not on file   Physical Activity:     Days of Exercise per Week: Not on file    Minutes of Exercise per Session: Not on file   Stress:     Feeling of Stress : Not on file   Social Connections:     Frequency of Communication with Friends and Family: Not on file    Frequency of Social Gatherings with Friends and Family: Not on file    Attends Voodoo Services: Not on file    Active Member of [address] [place] [address] [employer] or Organizations: Not on file    Attends Club or Organization Meetings: Not on file    Marital Status: Not on file   Intimate Partner Violence:     Fear of Current or Ex-Partner: Not on file    Emotionally Abused: Not on file    Physically Abused: Not on file    Sexually Abused: Not on file   Housing Stability:     Unable to Pay for Housing in the Last Year: Not on file    Number of [place] in the Last Year: Not on file    Unstable Housing in the Last Year: Not on file       Family History   Problem Relation Age of Onset    Kidney Disease Father     Dementia Father         alzheimers    Ovarian Cancer Mother     Breast Cancer Sister        REVIEW OF SYSTEMS:  Review of Systems   Constitutional: Negative for chills and fever. HENT: Negative for facial swelling and sore throat. Eyes: Negative for discharge and redness. Respiratory: Negative for chest tightness and wheezing. Cardiovascular: Negative for chest pain and palpitations. Gastrointestinal: Negative for nausea and vomiting. Endocrine: Negative for polyphagia and polyuria. Genitourinary: Negative for decreased urine volume, difficulty urinating, dysuria, enuresis, flank pain, frequency, genital sores, hematuria, penile discharge, penile pain, penile swelling, scrotal swelling, testicular pain and urgency. Musculoskeletal: Negative for back pain and neck stiffness. Skin: Negative for rash and wound. Neurological: Negative for dizziness and headaches. Hematological: Negative for adenopathy. Does not bruise/bleed easily. Psychiatric/Behavioral: Negative for confusion and hallucinations.          PHYSICAL EXAM:  Temp 98.1 °F (36.7 °C)   Ht 5' 11\" (1.803 m)   Wt 220 lb (99.8 kg)   BMI 30.68 kg/m²   Physical Exam  Constitutional:       General: He is not in acute distress. Appearance: Normal appearance. He is well-developed. HENT:      Head: Normocephalic and atraumatic. Nose: Nose normal.   Eyes:      General: No scleral icterus. Conjunctiva/sclera: Conjunctivae normal.      Pupils: Pupils are equal, round, and reactive to light. Neck:      Trachea: No tracheal deviation. Cardiovascular:      Rate and Rhythm: Normal rate and regular rhythm. Pulmonary:      Effort: Pulmonary effort is normal. No respiratory distress. Breath sounds: No stridor. Abdominal:      General: There is no distension. Palpations: Abdomen is soft. There is no mass. Tenderness: There is no abdominal tenderness. Musculoskeletal:         General: No tenderness. Normal range of motion. Cervical back: Normal range of motion and neck supple. Lymphadenopathy:      Cervical: No cervical adenopathy. Skin:     General: Skin is warm and dry. Findings: No erythema. Neurological:      Mental Status: He is alert and oriented to person, place, and time.    Psychiatric:         Behavior: Behavior normal.         Judgment: Judgment normal.             DATA:  CMP:    Lab Results   Component Value Date     08/17/2017    K 4.5 08/17/2017     08/17/2017    CO2 24 08/17/2017    BUN 27 08/17/2017    CREATININE 1.5 07/09/2020    CREATININE 1.6 08/17/2017    LABGLOM 46 07/09/2020    GLUCOSE 141 08/17/2017    PROT 6.9 04/15/2017    LABALBU 4.2 04/15/2017    CALCIUM 8.8 08/17/2017    BILITOT 0.5 04/15/2017    ALKPHOS 74 04/15/2017    AST 30 04/15/2017    ALT 22 04/15/2017     Results for orders placed or performed in visit on 02/18/22   POCT Urinalysis no Micro   Result Value Ref Range    Color, UA yellow     Clarity, UA clear     Glucose, UA POC neg     Bilirubin, UA neg     Ketones, UA neg     Spec Grav, UA 1.025     Blood, UA POC neg     pH, UA 5.5     Protein, UA POC neg     Urobilinogen, UA 0.2     Leukocytes, UA neg     Nitrite, UA neg     Appearance, Fluid Clear Clear, Slightly Cloudy     Lab Results   Component Value Date    PSA 0.05 02/15/2022    PSA 0.05 08/09/2021    PSA 0.04 12/14/2020    PSA 0.04 06/16/2020    PSA 0.03 12/09/2019     1. Malignant neoplasm of prostate (HCC)  PSA remains very low and stable. We will continue to monitor PSA every 6 months  - POCT Urinalysis no Micro  - PSA, Diagnostic; Future      Orders Placed This Encounter   Procedures    PSA, Diagnostic     PSA in 6 month     Standing Status:   Future     Standing Expiration Date:   2/18/2023    POCT Urinalysis no Micro        Return in about 6 months (around 8/18/2022) for PSA prior to vext visit. All information inputted into the note by the MA to include chief complaint, past medical history, past surgical history, medications, allergies, social and family history and review of systems has been reviewed and updated as needed by me. EMR Dragon/transcription disclaimer: Much of this documentt is electronic  transcription/translation of spoken language to printed text. The  electronic translation of spoken language may be erroneous, or at times,  nonsensical words or phrases may be inadvertently transcribed.  Although I  have reviewed the document for such errors, some may still exist.

## 2022-02-25 ENCOUNTER — HOSPITAL ENCOUNTER (OUTPATIENT)
Dept: VASCULAR LAB | Age: 77
Discharge: HOME | End: 2022-02-25
Payer: MEDICARE

## 2022-02-25 ENCOUNTER — OFFICE VISIT (OUTPATIENT)
Dept: VASCULAR SURGERY | Age: 77
End: 2022-02-25
Payer: MEDICARE

## 2022-02-25 ENCOUNTER — HOSPITAL ENCOUNTER (OUTPATIENT)
Dept: CT IMAGING | Age: 77
Discharge: HOME | End: 2022-02-25
Payer: MEDICARE

## 2022-02-25 VITALS
BODY MASS INDEX: 30.68 KG/M2 | TEMPERATURE: 98.4 F | OXYGEN SATURATION: 98 % | HEART RATE: 72 BPM | DIASTOLIC BLOOD PRESSURE: 82 MMHG | HEIGHT: 71 IN | SYSTOLIC BLOOD PRESSURE: 146 MMHG

## 2022-02-25 DIAGNOSIS — I65.23 BILATERAL CAROTID ARTERY STENOSIS: ICD-10-CM

## 2022-02-25 DIAGNOSIS — I70.213 ATHEROSCLER OF NATIVE ARTERY OF BOTH LEGS WITH INTERMIT CLAUDICATION (HCC): Primary | ICD-10-CM

## 2022-02-25 DIAGNOSIS — I71.40 AAA (ABDOMINAL AORTIC ANEURYSM) WITHOUT RUPTURE: ICD-10-CM

## 2022-02-25 DIAGNOSIS — I70.213 ATHEROSCLEROSIS OF NATIVE ARTERY OF BOTH LOWER EXTREMITIES WITH INTERMITTENT CLAUDICATION (HCC): ICD-10-CM

## 2022-02-25 PROCEDURE — 93923 UPR/LXTR ART STDY 3+ LVLS: CPT

## 2022-02-25 PROCEDURE — 99214 OFFICE O/P EST MOD 30 MIN: CPT | Performed by: NURSE PRACTITIONER

## 2022-02-25 PROCEDURE — 4004F PT TOBACCO SCREEN RCVD TLK: CPT | Performed by: NURSE PRACTITIONER

## 2022-02-25 PROCEDURE — 74176 CT ABD & PELVIS W/O CONTRAST: CPT

## 2022-02-25 PROCEDURE — 1123F ACP DISCUSS/DSCN MKR DOCD: CPT | Performed by: NURSE PRACTITIONER

## 2022-02-25 PROCEDURE — 93880 EXTRACRANIAL BILAT STUDY: CPT

## 2022-02-25 PROCEDURE — 3017F COLORECTAL CA SCREEN DOC REV: CPT | Performed by: NURSE PRACTITIONER

## 2022-02-25 PROCEDURE — G8484 FLU IMMUNIZE NO ADMIN: HCPCS | Performed by: NURSE PRACTITIONER

## 2022-02-25 PROCEDURE — G8427 DOCREV CUR MEDS BY ELIG CLIN: HCPCS | Performed by: NURSE PRACTITIONER

## 2022-02-25 PROCEDURE — G8417 CALC BMI ABV UP PARAM F/U: HCPCS | Performed by: NURSE PRACTITIONER

## 2022-02-25 PROCEDURE — 4040F PNEUMOC VAC/ADMIN/RCVD: CPT | Performed by: NURSE PRACTITIONER

## 2022-02-25 NOTE — PROGRESS NOTES
Dana Barrios (:  1836) is a 77 y.o. male,Established patient, here for evaluation of the following chief complaint(s):  Follow-up (AAA (abdominal aortic aneurysm) without rupture)            SUBJECTIVE/OBJECTIVE:  Bonnie Bird has a history of peripheral vascular disease of the lower extremities. He has had this for 1 - 5 years. Current treatment includes clopidogrel 75 mg po qd, ASA EC daily. Bonnie Bird has not had new wounds. Recently, he reports claudication at a distance of which varies. Bonnie Bird reports that the right leg is equal to the left. He reports claudication is not changed and is mostly in the form of crampy type pain starting in the calves. He has a short recovery time. This is reproducible in nature. He reports ischemic rest pain 0 times per night. He reports walking with cart does not help. He presents for follow up of carotid artery stenosis. He has a known history of carotid artery stenosis for 1 - 5 years. His current treatment includes clopidogrel 75 mg po qd, ASA EC daily. He denies a history of CVA. He reports no TIA's, episodes of lateralizing weakness and episodes of amaurosis fugax. He has a known history of abdominal aortic aneurysm for 1 - 5 years. He has not had abdominal pain. He has not had back pain in the cervical spine, thoracic spine, lumbar spine and sacral spine region. This pain is unchanged since the last visit. Pain is rated as 0.          Dana Barrios is a 77 y.o. male with the following history as [facility] in [facility]:  Patient Active Problem List    Diagnosis Date Noted    Memory loss 2017    Abnormal brain scan 2017    [facility]otid disease, bilateral ([facility] 2017    Paroxysmal atrial fibrillation ([facility] 04/15/2017    Coronary artery disease involving native coronary artery of native heart without angina pectoris     Hyperlipidemia     Hyperthyroidism     Right BBB/left ant fasc block     Essential hypertension     Sleep apnea  AAA (abdominal aortic aneurysm) without rupture (HCC)     Atherosclerosis of native artery of both lower extremities with intermittent claudication (HCC)     Bilateral carotid artery stenosis     Malignant neoplasm of prostate ([facility] 10/05/2016    Presence of stent in coronary artery 09/27/2016     Current Outpatient Medications   Medication Sig Dispense Refill    ezetimibe (ZETIA) 10 MG tablet Take 10 mg by mouth daily      febuxostat (ULORIC) 40 MG TABS tablet TAKE ONE TABLET BY MOUTH EVERY DAY      amLODIPine (NORVASC) 10 MG tablet   2    nicotine polacrilex (NICORETTE) 4 MG gum 4 mg      clopidogrel (PLAVIX) 75 MG tablet Take 1 tablet by mouth daily 30 tablet 0    Multiple Vitamins-Minerals (MULTI FOR HIM 50+ PO) Take 1 tablet by mouth daily       levothyroxine (SYNTHROID) 125 MCG tablet Take 137 mcg by mouth Daily       aspirin 325 MG tablet Take 325 mg by mouth daily       atenolol (TENORMIN) 50 MG tablet Take 25 mg by mouth daily Indications: High Blood Pressure       atorvastatin (LIPITOR) 20 MG tablet Take 40 mg by mouth nightly Indications: Changes in Cholesterol       Cholecalciferol (VITAMIN D3) 1000 UNITS TABS Take 1,000 Units by mouth daily       coenzyme Q10 100 MG CAPS capsule Take 100 mg by mouth daily       lisinopril (PRINIVIL;ZESTRIL) 20 MG tablet Take 20 mg by mouth daily       nitroGLYCERIN (NITROSTAT) 0.4 MG SL tablet Place 0.4 mg under the tongue every 5 minutes as needed        No current facility-administered medications for this visit.      Allergies: Pollen extract  Past Medical History:   Diagnosis Date    AAA (abdominal aortic aneurysm) without rupture (HCC)     Arthritis     Atheroscler of native artery of both legs with intermit claudication ([facility]     BPH without obstruction/lower urinary tract symptoms     CAD in native artery     sees dr. Phil Harry Carotid artery stenosis     History of blood transfusion     Hyperlipidemia     Hypertension     Hyperthyroidism     Iron deficiency     Malignant neoplasm [facility]     surgery    Right BBB/left ant fasc block     per dr. Nadya Nevarez; awaiting ekg from wb/office.  Sleep apnea     cpap used    TIA (transient ischemic attack)      Past Surgical History:   Procedure Laterality Date    CAROTID ENDARTERECTOMY Left 4/12/2017    SJS. Left carotid endarterectomy with bovine pericardial catch. Left cervical carotid arteriograms.  CATARACT REMOVAL Bilateral     CORONARY ANGIOPLASTY WITH STENT PLACEMENT  2010    dr. Ursula Gooden Left 8/16/2017    WOUND EXPLORATION FOR POST-OP BLEEDING performed by Kristi Carreno MD at [place] [id number] Left 8/16/2017    PERCUTANEOUS TRANSLUMINAL BALLOON ANGIOPLASTY OF LEFT COMMON AND EXTERNAL ILIAC ARTERIES AND RIGHT, EXTERNAL ILIAC ARTERY; LEFT COMMON FEMORAL ENDARTERECTOMY WITH BOVINE PATCH ANGIOPLASTY, SUPERFICIAL FEMORAL ENDARECTOMY WITH BOVINE PATCHING performed by Kristi Carreno MD at [address] [place] [address] VASCULAR SURGERY  05/22/2017    Right CFA 5f-6f 90 cm sheath, Right EIA stent (Express 8x27), Arch aortogram, Right carotid/intracerebral arteriograms, Right distal ICA emboshield filter, Right ICA stent (xact 8-10x40), Right ICA stent balloon angioplasty 6x40 viatrac, Retrieval of emoshield filter, Right carotid/intracerebral arteriograms, Distal aortogram w/ bilateral iliofemoral arterigrams, Mynx right CFA-- [address] VASCULAR SURGERY  07/25/2017    S. Right CFA 5f sheath,aortogram with bilateral lower arteriogram,mynx right CFA.  VASCULAR SURGERY  08/09/2017    S Percutaneous cannulation right common femoral artery with 5 [place] glide sheath. Suprarenal abdominal aortogram with bilateral lower extremity arteriogram.Mynx closure right common femoral artery puncture site.     WRIST FRACTURE SURGERY Right      Family History   Problem Relation Age of Onset    Kidney Disease Father  Dementia Father         alzheimers    Ovarian Cancer Mother     Breast Cancer Sister      Social History     Tobacco Use    Smoking status: Light Tobacco Smoker     Packs/day: 0.25     Years: 50.00     Pack years: 12.50     Last attempt to quit: 4/10/2017     Years since quittin.8    Smokeless tobacco: Never Used    Tobacco comment: Pt is trying to quit   Substance Use Topics    Alcohol use: Yes     Alcohol/week: 2.0 standard drinks     Types: 2 Cans of beer per week     Comment: 2 beers in the evening       ROS  Eyes - no sudden vision change or amaurosis. Respiratory - no significant shortness of breath,  Cardiovascular - no chest pain or syncope. No  significant leg swelling.  has claudication. Musculoskeletal - no gait disturbance  Skin - no new wound. Neurologic -  No speech difficulty or lateralizing weakness. All other review of systems are negative. Physical Exam    BP (!) 146/82 (Site: Left Upper Arm)   Pulse 72   Temp 98.4 °F (36.9 °C)   Ht 5' 11\" (1.803 m)   SpO2 98%   BMI 30.68 kg/m²       Neck- carotid pulses 2+ to palpation with no bruit  Cardiovascular - Regular rate and rhythm. Pulmonary - effort appears normal.  No respiratory distress. Lungs - Breath sounds normal. No wheezes or rales. Extremities -  - Radial and brachial pulses are 2+ to palpation bilaterally. Right femoral pulse: present 2+; Right popliteal pulse: absent Right DP: absent; Right PT absent; Left femoral pulse: present 2+; Left popliteal pulse: absent; Left DP: absent; Left PT: absent No cyanosis, clubbing, or significant edema. No signs atheroembolic event. Neurologic - alert and oriented X 3. Physiologic. Face symmetric. Skin - warm, dry, and intact.   no wound  Psychiatric - mood, affect, and behavior appear normal.  Judgment and thought processes appear normal.    Risk factors for atherosclerosis of all vascular beds have been reviewed with the patient including:  Family history, tobacco abuse in all forms, elevated cholesterol, hyperlipidemia, and diabetes. Lower extremity arterial study: Right KARUNA 0.82, Left KARUNA 0.71. Individual films reviewed: Yes. These results were reviewed with the patient. Disease process is stable and chronic  by review of waveforms, I see no significant change    Doppler results:    Right CCA/ICA <50% stenotic  Left CCA/ICA <50% stenotic  Right verterbral artery flow is antegrade  Left verterbral artery flow is antegrade  Individual velocities reviewed: Yes. Results were reviewed with the patient. Disease process is stable and acute  by velocity criteria, I see no significant change      CT -   1. Atheromatous disease of the aortoiliac vessels and coronary   arteries. Abdominal aortic aneurysm with size measurements listed   above. There are bilateral common iliac artery stents. There are   postsurgical changes in the common iliac artery regions bilaterally. 2. Stranding of the fat around both kidneys has a stable appearance   and is likely related to chronic inflammatory change. 3. Prior prostate resection. after I individually reviewed the images, I see not change from the previous study  Individual films reviewed:  Yes. These results have been reviewed with the patient. Disease process is stable and chronic        Reviewed previous studies including: carotid u/s, nora and CT scan  Individual images were reviewed. I agree with the findings  Results were discussed with the patient. ASSESSMENT/PLAN:  1. Atheroscler of native artery of both legs with intermit claudication ([address]  2. Bilateral carotid artery stenosis  3. AAA (abdominal aortic aneurysm) without rupture ([address]    1. Atheroscler of native artery of both legs with intermit claudication ([address]    2. Bilateral carotid artery stenosis    3.  AAA (abdominal aortic aneurysm) without rupture (HCC)     stable and chronic    Discussed management of carotid u/s, nora and CT scan which includes:  continue asa and plavix to reduce risk of arterial thrombosis and to decrease rate of plaque buildup  Strongly encourage start/continue statin therapy -   Recommend no smoking - discussed the effect tobacco has on illness;   Proceed with 6 months with ct and nora          Patient instructed to walk as much as possible. Call our office with any progressive pain in leg(s) or hip(s) with walking. Take good care of your feet. Let us know right away if you develop a wound on your foot. An electronic signature was used to authenticate this note.     --ANANT Alfonso

## 2022-03-28 RX ORDER — AMLODIPINE BESYLATE 10 MG/1
10 TABLET ORAL DAILY
Qty: 90 TABLET | Refills: 3 | OUTPATIENT
Start: 2022-03-28

## 2022-03-31 RX ORDER — AMLODIPINE BESYLATE 10 MG/1
10 TABLET ORAL DAILY
Qty: 90 TABLET | Refills: 3 | OUTPATIENT
Start: 2022-03-31

## 2022-04-04 RX ORDER — AMLODIPINE BESYLATE 10 MG/1
10 TABLET ORAL DAILY
Qty: 30 TABLET | Refills: 0 | Status: SHIPPED | OUTPATIENT
Start: 2022-04-04 | End: 2022-05-02

## 2022-05-02 RX ORDER — AMLODIPINE BESYLATE 10 MG/1
TABLET ORAL
Qty: 30 TABLET | Refills: 0 | OUTPATIENT
Start: 2022-05-02

## 2022-05-02 RX ORDER — AMLODIPINE BESYLATE 10 MG/1
TABLET ORAL
Qty: 30 TABLET | Refills: 5 | Status: SHIPPED | OUTPATIENT
Start: 2022-05-02

## 2022-08-30 ENCOUNTER — HOSPITAL ENCOUNTER (OUTPATIENT)
Dept: CT IMAGING | Age: 77
Discharge: HOME | End: 2022-08-30
Payer: MEDICARE

## 2022-08-30 ENCOUNTER — OFFICE VISIT (OUTPATIENT)
Dept: VASCULAR SURGERY | Age: 77
End: 2022-08-30
Payer: MEDICARE

## 2022-08-30 VITALS
TEMPERATURE: 97.9 F | HEART RATE: 61 BPM | OXYGEN SATURATION: 99 % | DIASTOLIC BLOOD PRESSURE: 71 MMHG | SYSTOLIC BLOOD PRESSURE: 140 MMHG

## 2022-08-30 DIAGNOSIS — I70.213 ATHEROSCLER OF NATIVE ARTERY OF BOTH LEGS WITH INTERMIT CLAUDICATION (HCC): Primary | ICD-10-CM

## 2022-08-30 DIAGNOSIS — I65.23 BILATERAL CAROTID ARTERY STENOSIS: ICD-10-CM

## 2022-08-30 DIAGNOSIS — I71.40 AAA (ABDOMINAL AORTIC ANEURYSM) WITHOUT RUPTURE: ICD-10-CM

## 2022-08-30 PROCEDURE — 4004F PT TOBACCO SCREEN RCVD TLK: CPT | Performed by: NURSE PRACTITIONER

## 2022-08-30 PROCEDURE — 74176 CT ABD & PELVIS W/O CONTRAST: CPT | Performed by: RADIOLOGY

## 2022-08-30 PROCEDURE — G8417 CALC BMI ABV UP PARAM F/U: HCPCS | Performed by: NURSE PRACTITIONER

## 2022-08-30 PROCEDURE — 74176 CT ABD & PELVIS W/O CONTRAST: CPT

## 2022-08-30 PROCEDURE — 99214 OFFICE O/P EST MOD 30 MIN: CPT | Performed by: NURSE PRACTITIONER

## 2022-08-30 PROCEDURE — G8427 DOCREV CUR MEDS BY ELIG CLIN: HCPCS | Performed by: NURSE PRACTITIONER

## 2022-08-30 PROCEDURE — 1123F ACP DISCUSS/DSCN MKR DOCD: CPT | Performed by: NURSE PRACTITIONER

## 2022-08-30 NOTE — Clinical Note
Can we see if we can move his nora and add a carotid on sept 27th or after. He will be due for cvls as well and I dont want him to have to make multiple trips. No visit with me necessary.

## 2022-08-30 NOTE — PROGRESS NOTES
[id number]!! Janet Johnston (:  ) is a 69 y.o. male,Established patient, here for evaluation of the following chief complaint(s):  Follow-up (CT/Abdomen Pelvis)            SUBJECTIVE/OBJECTIVE:  He has a known history of abdominal aortic aneurysm for 1 - 5 years. He has not had abdominal pain. He has not had back pain in the cervical spine, thoracic spine, lumbar spine, and sacral spine region. This pain is unchanged since the last visit. Pain is rated as 0. Madeline Perez has a history of peripheral vascular disease of the lower extremities. He has had this for 1 - 5 years. Current treatment includes clopidogrel 75 mg po qd, ASA EC daily. Madeline Perez has not had new wounds. Recently, he reports claudication that varies. Madeline Perez reports that the right leg is equal to the left. He reports claudication is not changed and is mostly in the form of generalized weakness starting in the calves. And left hip. He has a short recovery time. This is reproducible in nature. He reports ischemic rest pain 0 times per night. He reports walking with cart does help. He presents for follow up of carotid artery stenosis. He has a known history of carotid artery stenosis for 1 - 5 years. His current treatment includes clopidogrel 75 mg po qd, ASA EC daily. He denies a history of CVA. He reports no TIA's, episodes of lateralizing weakness and episodes of amaurosis fugax.           Janet Johnston is a 69 y.o. male with the following history as recorded in [facility]:  Patient Active Problem List    Diagnosis Date Noted    Memory loss 2017    Abnormal brain scan 2017    Carotid disease, bilateral ([facility] [address] 2017    Paroxysmal atrial fibrillation ([facility] [address] 04/15/2017    Coronary artery disease involving native coronary artery of native heart without angina pectoris     Hyperlipidemia     Hyperthyroidism     Right BBB/left ant fasc block     Essential hypertension     Sleep apnea     AAA (abdominal aortic aneurysm) without rupture (HCC)     Atherosclerosis of native artery of both lower extremities with intermittent claudication (HCC)     Bilateral carotid artery stenosis     Malignant neoplasm of prostate ([address] 10/05/2016    Presence of stent in coronary artery 09/27/2016     Current Outpatient Medications   Medication Sig Dispense Refill    ezetimibe (ZETIA) 10 MG tablet Take 10 mg by mouth daily      febuxostat (ULORIC) 40 MG TABS tablet TAKE ONE TABLET BY MOUTH EVERY DAY      amLODIPine (NORVASC) 10 MG tablet   2    nicotine polacrilex (NICORETTE) 4 MG gum 4 mg      clopidogrel (PLAVIX) 75 MG tablet Take 1 tablet by mouth daily 30 tablet 0    Multiple Vitamins-Minerals (MULTI FOR HIM 50+ PO) Take 1 tablet by mouth daily       levothyroxine (SYNTHROID) 125 MCG tablet Take 137 mcg by mouth Daily       aspirin 325 MG tablet Take 325 mg by mouth daily       atenolol (TENORMIN) 50 MG tablet Take 25 mg by mouth daily Indications: High Blood Pressure       atorvastatin (LIPITOR) 20 MG tablet Take 40 mg by mouth nightly Indications: Changes in Cholesterol       Cholecalciferol (VITAMIN D3) 1000 UNITS TABS Take 1,000 Units by mouth daily       coenzyme Q10 100 MG CAPS capsule Take 100 mg by mouth daily       lisinopril (PRINIVIL;ZESTRIL) 20 MG tablet Take 20 mg by mouth daily       nitroGLYCERIN (NITROSTAT) 0.4 MG SL tablet Place 0.4 mg under the tongue every 5 minutes as needed        No current facility-administered medications for this visit.      Allergies: Pollen extract  Past Medical History:   Diagnosis Date    AAA (abdominal aortic aneurysm) without rupture (HCC)     Arthritis     Atheroscler of native artery of both legs with intermit claudication (HCC)     BPH without obstruction/lower urinary tract symptoms     CAD in native artery     sees dr. Alyce Velasquez    Carotid artery stenosis     History of blood transfusion     Hyperlipidemia     Hypertension     Hyperthyroidism     Iron deficiency     Malignant neoplasm prostate [facility])     surgery    Right BBB/left ant fasc block     per dr. Alyce Velasquez; awaiting ekg from [facility]/office. Sleep apnea     cpap used    TIA (transient ischemic attack)      Past Surgical History:   Procedure Laterality Date    CAROTID ENDARTERECTOMY Left 4/12/2017    SJS. Left carotid endarterectomy with bovine pericardial catch. Left cervical carotid arteriograms. CATARACT REMOVAL Bilateral     CORONARY ANGIOPLASTY WITH STENT PLACEMENT  2010    dr. Madeline Woodson Left 8/16/2017    WOUND EXPLORATION FOR POST-OP BLEEDING performed by Mayela Cantor MD at [place] [address] Left 8/16/2017    PERCUTANEOUS TRANSLUMINAL BALLOON ANGIOPLASTY OF LEFT COMMON AND EXTERNAL ILIAC ARTERIES AND RIGHT, EXTERNAL ILIAC ARTERY; LEFT COMMON FEMORAL ENDARTERECTOMY WITH BOVINE PATCH ANGIOPLASTY, SUPERFICIAL FEMORAL ENDARECTOMY WITH BOVINE PATCHING performed by Mayela Cantor MD at [address] [place] [address]      VASCULAR SURGERY  05/22/2017    Right CFA 5f-6f 90 cm sheath, Right EIA stent (Express 8x27), Arch aortogram, Right carotid/intracerebral arteriograms, Right distal ICA emboshield filter, Right ICA stent (xact 8-10x40), Right ICA stent balloon angioplasty 6x40 viatrac, Retrieval of emoshield filter, Right carotid/intracerebral arteriograms, Distal aortogram w/ bilateral iliofemoral arterigrams, Mynx right CFA-- [facility]    VASCULAR SURGERY  07/25/2017    S. Right CFA 5f sheath,aortogram with bilateral lower arteriogram,mynx right CFA. VASCULAR SURGERY  08/09/2017    S Percutaneous cannulation right common femoral artery with 5 [place] glide sheath. Suprarenal abdominal aortogram with bilateral lower extremity arteriogram.Mynx closure right common femoral artery puncture site.     WRIST FRACTURE SURGERY Right      Family History   Problem Relation Age of Onset    Kidney Disease Father     Dementia Father         alzheimers    Ovarian Cancer Mother     Breast Cancer Sister      Social History     Tobacco Use    Smoking status: Light Smoker     Packs/day: 0.25     Years: 50.00     Pack years: 12.50     Types: Cigarettes     Last attempt to quit: 4/10/2017     Years since quittin.3    Smokeless tobacco: Never    Tobacco comments:     Pt is trying to quit   Substance Use Topics    Alcohol use: Yes     Alcohol/week: 2.0 standard drinks     Types: 2 Cans of beer per week     Comment: 2 beers in the evening       ROS  Eyes - no sudden vision change or amaurosis. Respiratory - no significant shortness of breath,  Cardiovascular - no chest pain or syncope. No  significant leg swelling.  has claudication. Musculoskeletal - no gait disturbance  Skin - no new wound. Neurologic -  No speech difficulty or lateralizing weakness. All other review of systems are negative. Physical Exam    BP (!) 140/71 (Site: Left Upper Arm, Position: Sitting, Cuff Size: Medium Adult)   Pulse 61   Temp 97.9 °F (36.6 °C)   SpO2 99%       Neck- carotid pulses 2+ to palpation with no bruit  Cardiovascular - Regular rate and rhythm. Pulmonary - effort appears normal.  No respiratory distress. Lungs - Breath sounds normal. No wheezes or rales. GI - Abdomen - soft, non tender, bowel sounds X 4 quadrants. No guarding or rebound tenderness. No distension or palpable mass. Extremities - Radial and brachial pulses are 2+ to palpation bilaterally. Right femoral pulse: present 2+; Right popliteal pulse: absent Right DP: absent; Right PT absent; Left femoral pulse: present 2+; Left popliteal pulse: absent; Left DP: absent; Left PT: absent No cyanosis, clubbing, or significant edema. No signs atheroembolic event. Neurologic - alert and oriented X 3. Physiologic. Face symmetric. Skin - warm, dry, and intact. no wound  Psychiatric - mood, affect, and behavior appear normal.  Judgment and thought processes appear normal.      CT -   Lung bases:  Grossly clear.        Liver: Unremarkable size, contour, and density. No evidence of mass. No evidence of dilated ducts. Gallbladder Fossa:  Unremarkable       Spleen:  Grossly unremarkable. Pancreas/adrenal glands:   Grossly unremarkable size, contour and density. Kidneys: In anatomic position. Grossly unremarkable renal size, contour and density. No renal or ureteral calculi. No hydronephrosis. Nonspecific perinephric fat stranding. Retroperitoneum: No retroperitoneal lymphadenopathy. The IVC is grossly unremarkable. Extensive atherosclerotic calcific plaques in the walls of the aorta and branches. Aneurysmal dilatation of abdominal aorta from below the origin of renal arteries    up to its aortic bifurcation (4.3 x 4.9 cm in AP by TV max dimensions). Peritoneal cavity:  No evidence of free air or ascites. Gastrointestinal tract: Nondistended small bowel and colon. No evidence of obstruction. Colonic diverticular formations with no evidence of diverticulitis. Appendix not individually visualized but there are no secondary CT findings to suggest appendicitis. Pelvis:  Solid and hollow viscera grossly unremarkable. Bladder is mildly distended. Osseous structures:  No acute or destructive bony process identified. Degenerative changes noted in the spine     after I individually reviewed the images, I see mild progression of disease  Individual films reviewed:  Yes. These results have been reviewed with the patient. Disease process is chronic illness with exacerbation or progression  I have measured this side by side with images from last CT. I see about 1 mm change     Reviewed previous studies including: carotid u/s, nora, and CT scan  Individual images were reviewed. I agree with the findings  Results were discussed with the patient. ASSESSMENT/PLAN:  1. Atheroscler of native artery of both legs with intermit claudication ([address]  2.  Bilateral carotid artery stenosis  - VL DUP CAROTID BILATERAL; Future  3. AAA (abdominal aortic aneurysm) without rupture (HCC)      Discussed management of carotid u/s, nora, and CT scan which includes:  continue asa, plavix, and lipitor to reduce risk of TIA/CVA, to reduce risk of arterial thrombosis, and to decrease rate of plaque buildup  Strongly encourage start/continue statin therapy -   Recommend no smoking Symptoms of rupture reviewed with the patient including sudden onset severe back pain or abdominal pain. This pain can sometimes radiate into the groin or leg. The patient may experience a feeling of impending doom or death. If this occurs they have been insturcted to call 911 and get to the emergency room telling them you have an aneurysm. Patient has voiced understanding. Will await nora and cvls in a couple of weeks    will need ct in 6 months             An electronic signature was used to authenticate this note.     --ANANT Macias

## 2022-09-01 ENCOUNTER — TELEPHONE (OUTPATIENT)
Dept: VASCULAR SURGERY | Age: 77
End: 2022-09-01

## 2022-09-01 NOTE — TELEPHONE ENCOUNTER
Spoke with the patient to let him know that I cancelled his original ROSIE and made a spot for his ROSIE and Carotid with a follow up appt with Kylah on 10/13/2022. Patient is aware of the dates and time.

## 2022-09-06 DIAGNOSIS — C61 MALIGNANT NEOPLASM OF PROSTATE (HCC): ICD-10-CM

## 2022-09-06 LAB — PROSTATE SPECIFIC ANTIGEN: 0.07 NG/ML (ref 0–4)

## 2022-09-09 ENCOUNTER — OFFICE VISIT (OUTPATIENT)
Dept: UROLOGY | Age: 77
End: 2022-09-09
Payer: MEDICARE

## 2022-09-09 VITALS
SYSTOLIC BLOOD PRESSURE: 144 MMHG | WEIGHT: 217 LBS | TEMPERATURE: 97.2 F | BODY MASS INDEX: 30.38 KG/M2 | HEIGHT: 71 IN | DIASTOLIC BLOOD PRESSURE: 72 MMHG | HEART RATE: 53 BPM | OXYGEN SATURATION: 100 %

## 2022-09-09 DIAGNOSIS — C61 MALIGNANT NEOPLASM OF PROSTATE (HCC): Primary | ICD-10-CM

## 2022-09-09 LAB
APPEARANCE FLUID: CLEAR
BILIRUBIN, POC: NORMAL
BLOOD URINE, POC: NORMAL
CLARITY, POC: CLEAR
COLOR, POC: YELLOW
GLUCOSE URINE, POC: NORMAL
KETONES, POC: NORMAL
LEUKOCYTE EST, POC: NORMAL
NITRITE, POC: NORMAL
PH, POC: 6
PROTEIN, POC: NORMAL
SPECIFIC GRAVITY, POC: 1.01
UROBILINOGEN, POC: 0.2

## 2022-09-09 PROCEDURE — 1123F ACP DISCUSS/DSCN MKR DOCD: CPT | Performed by: UROLOGY

## 2022-09-09 PROCEDURE — 4004F PT TOBACCO SCREEN RCVD TLK: CPT | Performed by: UROLOGY

## 2022-09-09 PROCEDURE — G8417 CALC BMI ABV UP PARAM F/U: HCPCS | Performed by: UROLOGY

## 2022-09-09 PROCEDURE — 99213 OFFICE O/P EST LOW 20 MIN: CPT | Performed by: UROLOGY

## 2022-09-09 PROCEDURE — 81002 URINALYSIS NONAUTO W/O SCOPE: CPT | Performed by: UROLOGY

## 2022-09-09 PROCEDURE — G8427 DOCREV CUR MEDS BY ELIG CLIN: HCPCS | Performed by: UROLOGY

## 2022-09-09 ASSESSMENT — ENCOUNTER SYMPTOMS
SORE THROAT: 0
EYE DISCHARGE: 0
EYE REDNESS: 0
CHEST TIGHTNESS: 0
FACIAL SWELLING: 0
VOMITING: 0
WHEEZING: 0
BACK PAIN: 0
NAUSEA: 0

## 2022-09-09 NOTE — PROGRESS NOTES
Anastasiya Moreno is a 69 y.o. male who presents today   Chief Complaint   Patient presents with    Follow-up     I am here today for a 7 month fu. I had my psa done prior. Prostate Cancer  Patient is here today for prostate cancer which was first diagnosed 10.5 years ago. His prostate cancer can be characterized as early-stage organ confined pathologic stage T2b Mariano 3+4 = 7. PSA was undetectable until 2016 9 he has a very low slightly detectable PSA consistent with biochemical detection over the last 6 years PSA has essentially been stable  His last several PSA values are as follows:  Lab Results   Component Value Date    PSA 0.07 09/06/2022    PSA 0.05 02/15/2022    PSA 0.05 08/09/2021    PSA 0.04 12/14/2020    PSA 0.04 06/16/2020     Previous treatment of prostate cancer: Radical prostatectomy done April 9, 2012. Lower urinary tract symptoms: frequency and nocturia, 1 times per night         Past Medical History:   Diagnosis Date    AAA (abdominal aortic aneurysm) without rupture (HCC)     Arthritis     Atheroscler of native artery of both legs with intermit claudication (HCC)     BPH without obstruction/lower urinary tract symptoms     CAD in native artery     sees dr. Pearl Stone    Carotid artery stenosis     History of blood transfusion     Hyperlipidemia     Hypertension     Hyperthyroidism     Iron deficiency     Malignant neoplasm prostate ([address]     surgery    Right BBB/left ant fasc block     per dr. Pearl Stone; awaiting ekg from [facility]/office. Sleep apnea     cpap used    TIA (transient ischemic attack)        Past Surgical History:   Procedure Laterality Date    CAROTID ENDARTERECTOMY Left 4/12/2017    SJS. Left carotid endarterectomy with bovine pericardial catch. Left cervical carotid arteriograms.     CATARACT REMOVAL Bilateral     CORONARY ANGIOPLASTY WITH STENT PLACEMENT  2010    dr. Driss Small Left 8/16/2017    WOUND EXPLORATION FOR POST-OP BLEEDING performed by Pilo Wilson MD at [facility] OR    FEMORAL ENDARTERECTOMY Left 8/16/2017    PERCUTANEOUS TRANSLUMINAL BALLOON ANGIOPLASTY OF LEFT COMMON AND EXTERNAL ILIAC ARTERIES AND RIGHT, EXTERNAL ILIAC ARTERY; LEFT COMMON FEMORAL ENDARTERECTOMY WITH BOVINE PATCH ANGIOPLASTY, SUPERFICIAL FEMORAL ENDARECTOMY WITH BOVINE PATCHING performed by Sabrina Valente MD at [address] [place] [address]      Radical     TONSILLECTOMY      VASCULAR SURGERY  05/22/2017    Right CFA 5f-6f 90 cm sheath, Right EIA stent (Express 8x27), Arch aortogram, Right carotid/intracerebral arteriograms, Right distal ICA emboshield filter, Right ICA stent (xact 8-10x40), Right ICA stent balloon angioplasty 6x40 viatrac, Retrieval of emoshield filter, Right carotid/intracerebral arteriograms, Distal aortogram w/ bilateral iliofemoral arterigrams, Mynx right CFA-- [facility]    VASCULAR SURGERY  07/25/2017    SJS. Right CFA 5f sheath,aortogram with bilateral lower arteriogram,mynx right CFA. VASCULAR SURGERY  08/09/2017    SJS Percutaneous cannulation right common femoral artery with 5 [place] glide sheath. Suprarenal abdominal aortogram with bilateral lower extremity arteriogram.Mynx closure right common femoral artery puncture site.     WRIST FRACTURE SURGERY Right        Current Outpatient Medications   Medication Sig Dispense Refill    ezetimibe (ZETIA) 10 MG tablet Take 10 mg by mouth daily      febuxostat (ULORIC) 40 MG TABS tablet TAKE ONE TABLET BY MOUTH EVERY DAY      amLODIPine (NORVASC) 10 MG tablet   2    nicotine polacrilex (NICORETTE) 4 MG gum 4 mg      clopidogrel (PLAVIX) 75 MG tablet Take 1 tablet by mouth daily 30 tablet 0    Multiple Vitamins-Minerals (MULTI FOR HIM 50+ PO) Take 1 tablet by mouth daily       levothyroxine (SYNTHROID) 125 MCG tablet Take 137 mcg by mouth Daily       aspirin 325 MG tablet Take 325 mg by mouth daily       atenolol (TENORMIN) 50 MG tablet Take 25 mg by mouth daily Indications: High Blood Pressure       atorvastatin (LIPITOR) 20 MG tablet Take 40 mg by mouth nightly Indications: Changes in Cholesterol       Cholecalciferol (VITAMIN D3) 1000 UNITS TABS Take 1,000 Units by mouth daily       coenzyme Q10 100 MG CAPS capsule Take 100 mg by mouth daily       lisinopril (PRINIVIL;ZESTRIL) 20 MG tablet Take 20 mg by mouth daily       nitroGLYCERIN (NITROSTAT) 0.4 MG SL tablet Place 0.4 mg under the tongue every 5 minutes as needed        No current facility-administered medications for this visit. Allergies   Allergen Reactions    Pollen Extract        Social History     Socioeconomic History    Marital status:      Spouse name: None    Number of children: None    Years of education: None    Highest education level: None   Tobacco Use    Smoking status: Light Smoker     Packs/day: 0.25     Years: 50.00     Pack years: 12.50     Types: Cigarettes     Last attempt to quit: 4/10/2017     Years since quittin.4    Smokeless tobacco: Never    Tobacco comments:     Pt is trying to quit   Substance and Sexual Activity    Alcohol use: Yes     Alcohol/week: 2.0 standard drinks     Types: 2 Cans of beer per week     Comment: 2 beers in the evening    Drug use: No       Family History   Problem Relation Age of Onset    Kidney Disease Father     Dementia Father         alzheimers    Ovarian Cancer Mother     Breast Cancer Sister        REVIEW OF SYSTEMS:  Review of Systems   Constitutional:  Negative for chills and fever. HENT:  Negative for facial swelling and sore throat. Eyes:  Negative for discharge and redness. Respiratory:  Negative for chest tightness and wheezing. Cardiovascular:  Negative for chest pain and palpitations. Gastrointestinal:  Negative for nausea and vomiting. Endocrine: Negative for polyphagia and polyuria.    Genitourinary:  Negative for decreased urine volume, difficulty urinating, dysuria, enuresis, flank pain, frequency, genital sores, hematuria, penile discharge, penile pain, penile swelling, scrotal swelling, testicular pain and urgency. Musculoskeletal:  Negative for back pain and neck stiffness. Skin:  Negative for rash and wound. Neurological:  Negative for dizziness and headaches. Hematological:  Negative for adenopathy. Does not bruise/bleed easily. Psychiatric/Behavioral:  Negative for confusion and hallucinations. PHYSICAL EXAM:  BP (!) 144/72   Pulse 53   Temp 97.2 °F (36.2 °C)   Ht 5' 11\" (1.803 m)   Wt 217 lb (98.4 kg)   SpO2 100%   BMI 30.27 kg/m²   Physical Exam  Constitutional:       General: He is not in acute distress. Appearance: Normal appearance. He is well-developed. HENT:      Head: Normocephalic and atraumatic. Nose: Nose normal.   Eyes:      General: No scleral icterus. Conjunctiva/sclera: Conjunctivae normal.      Pupils: Pupils are equal, round, and reactive to light. Neck:      Trachea: No tracheal deviation. Cardiovascular:      Rate and Rhythm: Normal rate and regular rhythm. Pulses: Normal pulses. Pulmonary:      Effort: Pulmonary effort is normal. No respiratory distress. Breath sounds: No stridor. Abdominal:      General: There is no distension. Palpations: Abdomen is soft. There is no mass. Tenderness: There is no abdominal tenderness. Musculoskeletal:         General: No tenderness or deformity. Normal range of motion. Cervical back: Normal range of motion and neck supple. Lymphadenopathy:      Cervical: No cervical adenopathy. Skin:     General: Skin is warm and dry. Findings: No erythema. Neurological:      General: No focal deficit present. Mental Status: He is alert and oriented to person, place, and time.    Psychiatric:         Behavior: Behavior normal.         Judgment: Judgment normal.           DATA:    Results for orders placed or performed in visit on 09/09/22   POCT Urinalysis no Micro   Result Value Ref Range    Color, UA yellow     Clarity, UA clear     Glucose, UA POC neg     Bilirubin, UA neg     Ketones, UA neg     Spec Grav, UA 1.010     Blood, UA POC neg     pH, UA 6.0     Protein, UA POC neg     Urobilinogen, UA 0.2     Leukocytes, UA neg     Nitrite, UA neg     Appearance, Fluid Clear Clear, Slightly Cloudy     Lab Results   Component Value Date    PSA 0.07 09/06/2022    PSA 0.05 02/15/2022    PSA 0.05 08/09/2021    PSA 0.04 12/14/2020    PSA 0.04 06/16/2020     1. Malignant neoplasm of prostate (HCC)  PSA is detectable but still very low and has not reached the threshold of 0.2 to consider recurrence therefore I recommend continue watchful waiting with serial PSA every 6 months  - POCT Urinalysis no Micro  - PSA, Diagnostic; Future      Orders Placed This Encounter   Procedures    PSA, Diagnostic     PSA in 6 month     Standing Status:   Future     Standing Expiration Date:   9/9/2023    POCT Urinalysis no Micro        Return in about 6 months (around 3/9/2023) for PSA prior to vext visit. All information inputted into the note by the MA to include chief complaint, past medical history, past surgical history, medications, allergies, social and family history and review of systems has been reviewed and updated as needed by me. EMR Dragon/transcription disclaimer: Much of this documentt is electronic  transcription/translation of spoken language to printed text. The  electronic translation of spoken language may be erroneous, or at times,  nonsensical words or phrases may be inadvertently transcribed.  Although I  have reviewed the document for such errors, some may still exist.

## 2022-10-13 ENCOUNTER — OFFICE VISIT (OUTPATIENT)
Dept: VASCULAR SURGERY | Age: 77
End: 2022-10-13
Payer: MEDICARE

## 2022-10-13 ENCOUNTER — HOSPITAL ENCOUNTER (OUTPATIENT)
Dept: VASCULAR LAB | Age: 77
Discharge: HOME | End: 2022-10-13
Payer: MEDICARE

## 2022-10-13 VITALS
HEIGHT: 71 IN | HEART RATE: 57 BPM | TEMPERATURE: 97.3 F | SYSTOLIC BLOOD PRESSURE: 140 MMHG | DIASTOLIC BLOOD PRESSURE: 76 MMHG | OXYGEN SATURATION: 99 % | BODY MASS INDEX: 30.27 KG/M2

## 2022-10-13 DIAGNOSIS — I65.23 BILATERAL CAROTID ARTERY STENOSIS: Primary | ICD-10-CM

## 2022-10-13 DIAGNOSIS — I70.213 ATHEROSCLER OF NATIVE ARTERY OF BOTH LEGS WITH INTERMIT CLAUDICATION (HCC): ICD-10-CM

## 2022-10-13 DIAGNOSIS — I65.23 BILATERAL CAROTID ARTERY STENOSIS: ICD-10-CM

## 2022-10-13 DIAGNOSIS — I71.43 INFRARENAL ABDOMINAL AORTIC ANEURYSM (AAA) WITHOUT RUPTURE: ICD-10-CM

## 2022-10-13 PROCEDURE — 99214 OFFICE O/P EST MOD 30 MIN: CPT | Performed by: NURSE PRACTITIONER

## 2022-10-13 PROCEDURE — G8484 FLU IMMUNIZE NO ADMIN: HCPCS | Performed by: NURSE PRACTITIONER

## 2022-10-13 PROCEDURE — G8427 DOCREV CUR MEDS BY ELIG CLIN: HCPCS | Performed by: NURSE PRACTITIONER

## 2022-10-13 PROCEDURE — 4004F PT TOBACCO SCREEN RCVD TLK: CPT | Performed by: NURSE PRACTITIONER

## 2022-10-13 PROCEDURE — 93880 EXTRACRANIAL BILAT STUDY: CPT

## 2022-10-13 PROCEDURE — 93923 UPR/LXTR ART STDY 3+ LVLS: CPT

## 2022-10-13 PROCEDURE — 1123F ACP DISCUSS/DSCN MKR DOCD: CPT | Performed by: NURSE PRACTITIONER

## 2022-10-13 PROCEDURE — G8417 CALC BMI ABV UP PARAM F/U: HCPCS | Performed by: NURSE PRACTITIONER

## 2022-10-13 NOTE — PROGRESS NOTES
Grisel Molina (:  1945) is a 69 y.o. male,Established patient, here for evaluation of the following chief complaint(s):  Follow-up (ROSIE/ Carotid )            SUBJECTIVE/OBJECTIVE:  Sundeep Daigle has a history of peripheral vascular disease of the lower extremities. He has had this for 1 - 5 years. Current treatment includes clopidogrel 75 mg po qd, ASA EC daily. Sundeep Daigle has not had new wounds. Recently, he reports claudication at a distance of  which is extended. Sundeep Daigle reports that the right leg is equal to the left. He reports claudication is not changed and is mostly in the form of generalized weakness starting in the calves. He has a short recovery time. This is reproducible in nature. He reports ischemic rest pain 0 times per night. He reports walking with cart does not help. He presents for follow up of carotid artery stenosis. He has a known history of carotid artery stenosis for 1 - 5 years. His current treatment includes clopidogrel 75 mg po qd, ASA EC daily. He denies a history of CVA. He reports no TIA's, episodes of lateralizing weakness and episodes of amaurosis fugax.           Grisel Molina is a 69 y.o. male with the following history as recorded in [facility]:  Patient Active Problem List    Diagnosis Date Noted    Memory loss 2017    Abnormal brain scan 2017    Carotid disease, bilateral ([address] 2017    Paroxysmal atrial fibrillation ([address] 04/15/2017    Coronary artery disease involving native coronary artery of native heart without angina pectoris     Hyperlipidemia     Hyperthyroidism     Right BBB/left ant fasc block     Essential hypertension     Sleep apnea     AAA (abdominal aortic aneurysm) without rupture     Atherosclerosis of native artery of both lower extremities with intermittent claudication (HCC)     Bilateral carotid artery stenosis     Malignant neoplasm of prostate ([facility] 10/05/2016    Presence of stent in coronary artery 2016     Current Outpatient Medications   Medication Sig Dispense Refill    ezetimibe (ZETIA) 10 MG tablet Take 10 mg by mouth daily      febuxostat (ULORIC) 40 MG TABS tablet TAKE ONE TABLET BY MOUTH EVERY DAY      amLODIPine (NORVASC) 10 MG tablet   2    nicotine polacrilex (NICORETTE) 4 MG gum 4 mg      clopidogrel (PLAVIX) 75 MG tablet Take 1 tablet by mouth daily 30 tablet 0    Multiple Vitamins-Minerals (MULTI FOR HIM 50+ PO) Take 1 tablet by mouth daily       levothyroxine (SYNTHROID) 125 MCG tablet Take 137 mcg by mouth Daily       aspirin 325 MG tablet Take 325 mg by mouth daily       atenolol (TENORMIN) 50 MG tablet Take 25 mg by mouth daily Indications: High Blood Pressure       atorvastatin (LIPITOR) 20 MG tablet Take 40 mg by mouth nightly Indications: Changes in Cholesterol       Cholecalciferol (VITAMIN D3) 1000 UNITS TABS Take 1,000 Units by mouth daily       coenzyme Q10 100 MG CAPS capsule Take 100 mg by mouth daily       lisinopril (PRINIVIL;ZESTRIL) 20 MG tablet Take 20 mg by mouth daily       nitroGLYCERIN (NITROSTAT) 0.4 MG SL tablet Place 0.4 mg under the tongue every 5 minutes as needed        No current facility-administered medications for this visit. Allergies: Pollen extract  Past Medical History:   Diagnosis Date    AAA (abdominal aortic aneurysm) without rupture     Arthritis     Atheroscler of native artery of both legs with intermit claudication ([facility]     CAD in native artery     sees dr. Santy Bautista    Carotid artery stenosis     History of blood transfusion     Hyperlipidemia     Hypertension     Hyperthyroidism     Iron deficiency     Malignant neoplasm prostate ([facility]     surgery    Right BBB/left ant fasc block     per dr. Santy Bautista; awaiting ekg from wb/office. Sleep apnea     cpap used    TIA (transient ischemic attack)      Past Surgical History:   Procedure Laterality Date    CAROTID ENDARTERECTOMY Left 04/12/2017    SJS. Left carotid endarterectomy with bovine pericardial catch. Left cervical carotid arteriograms. CATARACT REMOVAL Bilateral     CORONARY ANGIOPLASTY WITH STENT PLACEMENT      dr. Desire Torres Left 2017    WOUND EXPLORATION FOR POST-OP BLEEDING performed by David Tobias MD at [place] [address] Left 2017    PERCUTANEOUS TRANSLUMINAL BALLOON ANGIOPLASTY OF LEFT COMMON AND EXTERNAL ILIAC ARTERIES AND RIGHT, EXTERNAL ILIAC ARTERY; LEFT COMMON FEMORAL ENDARTERECTOMY WITH BOVINE PATCH ANGIOPLASTY, SUPERFICIAL FEMORAL ENDARECTOMY WITH BOVINE PATCHING performed by David Tobias MD at [facility]      Radical     TONSILLECTOMY      VASCULAR SURGERY  2017    Right CFA 5f-6f 90 cm sheath, Right EIA stent (Express 8x27), Arch aortogram, Right carotid/intracerebral arteriograms, Right distal ICA emboshield filter, Right ICA stent (xact 8-10x40), Right ICA stent balloon angioplasty 6x40 viatrac, Retrieval of emoshield filter, Right carotid/intracerebral arteriograms, Distal aortogram w/ bilateral iliofemoral arterigrams, Mynx right CFA-- John Aguilera    VASCULAR SURGERY  2017    SJS. Right CFA 5f sheath,aortogram with bilateral lower arteriogram,mynx right CFA. VASCULAR SURGERY  2017    SJS Percutaneous cannulation right common femoral artery with 5 Bulgarian glide sheath. Suprarenal abdominal aortogram with bilateral lower extremity arteriogram.Mynx closure right common femoral artery puncture site.     WRIST FRACTURE SURGERY Right      Family History   Problem Relation Age of Onset    Kidney Disease Father     Dementia Father         alzheimers    Ovarian Cancer Mother     Breast Cancer Sister      Social History     Tobacco Use    Smoking status: Light Smoker     Packs/day: 0.25     Years: 50.00     Pack years: 12.50     Types: Cigarettes     Last attempt to quit: 4/10/2017     Years since quittin.5    Smokeless tobacco: Never    Tobacco comments:     Pt is trying to quit   Substance Use Topics Alcohol use: Yes     Alcohol/week: 2.0 standard drinks     Types: 2 Cans of beer per week     Comment: 2 beers in the evening       ROS  Eyes - no sudden vision change or amaurosis. Respiratory - no significant shortness of breath,  Cardiovascular - no chest pain or syncope. No  significant leg swelling.  has claudication. Musculoskeletal - no gait disturbance  Skin - no new wound. Neurologic -  No speech difficulty or lateralizing weakness. All other review of systems are negative. Physical Exam    BP (!) 140/76 (Site: Right Upper Arm, Position: Sitting)   Pulse 57   Temp 97.3 °F (36.3 °C)   Ht 5' 11\" (1.803 m)   SpO2 99%   BMI 30.27 kg/m²       Neck- carotid pulses 2+ to palpation with no bruit  Cardiovascular - Regular rate and rhythm. Pulmonary - effort appears normal.  No respiratory distress. Lungs - Breath sounds normal. No wheezes or rales. Extremities -  - Radial and brachial pulses are 2+ to palpation bilaterally. Right femoral pulse: present 2+; Right popliteal pulse: absent Right DP: absent; Right PT absent; Left femoral pulse: present 2+; Left popliteal pulse: absent; Left DP: absent; Left PT: absent No cyanosis, clubbing, or significant edema. No signs atheroembolic event. Neurologic - alert and oriented X 3. Physiologic. Face symmetric. Skin - warm, dry, and intact. no wound  Psychiatric - mood, affect, and behavior appear normal.  Judgment and thought processes appear normal.    Risk factors for atherosclerosis of all vascular beds have been reviewed with the patient including:  Family history, tobacco abuse in all forms, elevated cholesterol, hyperlipidemia, and diabetes. Lower extremity arterial study: Right KARUNA 0.79, Left KARUNA 0.79. Individual films reviewed: yes. These results were reviewed with the patient.   Disease process is chronic illness with exacerbation or progression  by review of waveforms, I see mild progression of disease    Doppler results:    Right CCA/ICA <50% stenotic  Left CCA/ICA <50% stenotic  Right verterbral artery flow is antegrade  Left verterbral artery flow is antegrade  Individual velocities reviewed: Yes. Results were reviewed with the patient. Disease process is stable and chronic  by velocity criteria, I see no significant change          Reviewed previous studies including: carotid u/s, nora, and CT scan  Individual images were reviewed. I agree with the findings  Results were discussed with the patient. ASSESSMENT/PLAN:  1. Bilateral carotid artery stenosis  2. Atheroscler of native artery of both legs with intermit claudication ([address]    1. Bilateral carotid artery stenosis    2. Atheroscler of native artery of both legs with intermit claudication ([address]         Discussed management of carotid u/s and nora which includes:  continue asa, plavix, and lipitor to reduce risk of TIA/CVA, to reduce risk of arterial thrombosis, and to decrease rate of plaque buildup  Strongly encourage start/continue statin therapy -   Recommend no smoking - discussed the effect tobacco has on illness;   Proceed with 6 months nora and ct          Patient instructed to walk as much as possible. Call our office with any progressive pain in leg(s) or hip(s) with walking. Take good care of your feet. Let us know right away if you develop a wound on your foot. An electronic signature was used to authenticate this note.     --ANANT Kraus

## 2022-11-17 RX ORDER — CLOPIDOGREL BISULFATE 75 MG/1
75 TABLET ORAL DAILY
Qty: 90 TABLET | Refills: 10 | OUTPATIENT
Start: 2022-11-17

## 2022-11-21 RX ORDER — CLOPIDOGREL BISULFATE 75 MG/1
75 TABLET ORAL DAILY
Qty: 90 TABLET | Refills: 10 | OUTPATIENT
Start: 2022-11-21

## 2022-11-21 NOTE — TELEPHONE ENCOUNTER
REFUSED PATIENTS [facility] DUE TO PATIENT NOT HAVING APPOINTMENT THIS YEAR, PLEASE CONTACT PATIENT TO SCHEDULE F/U APPOINTMENT.

## 2022-11-28 RX ORDER — AMLODIPINE BESYLATE 10 MG/1
TABLET ORAL
Qty: 30 TABLET | Refills: 10 | OUTPATIENT
Start: 2022-11-28

## 2022-12-01 RX ORDER — AMLODIPINE BESYLATE 10 MG/1
TABLET ORAL
OUTPATIENT
Start: 2022-12-01

## 2023-03-14 DIAGNOSIS — C61 MALIGNANT NEOPLASM OF PROSTATE (HCC): ICD-10-CM

## 2023-03-14 LAB — PSA SERPL-MCNC: 0.07 NG/ML (ref 0–4)

## 2023-03-20 ENCOUNTER — OFFICE VISIT (OUTPATIENT)
Dept: UROLOGY | Age: 78
End: 2023-03-20
Payer: MEDICARE

## 2023-03-20 VITALS — TEMPERATURE: 99 F | WEIGHT: 210.2 LBS | BODY MASS INDEX: 28.47 KG/M2 | HEIGHT: 72 IN

## 2023-03-20 DIAGNOSIS — C61 MALIGNANT NEOPLASM OF PROSTATE (HCC): Primary | ICD-10-CM

## 2023-03-20 LAB
APPEARANCE FLUID: CLEAR
BILIRUBIN, POC: NORMAL
BLOOD URINE, POC: NORMAL
CLARITY, POC: CLEAR
COLOR, POC: YELLOW
GLUCOSE URINE, POC: NORMAL
KETONES, POC: NORMAL
LEUKOCYTE EST, POC: NORMAL
NITRITE, POC: NORMAL
PH, POC: 5.5
PROTEIN, POC: NORMAL
SPECIFIC GRAVITY, POC: 1.01
UROBILINOGEN, POC: 0.2

## 2023-03-20 PROCEDURE — 1123F ACP DISCUSS/DSCN MKR DOCD: CPT | Performed by: UROLOGY

## 2023-03-20 PROCEDURE — 99213 OFFICE O/P EST LOW 20 MIN: CPT | Performed by: UROLOGY

## 2023-03-20 PROCEDURE — 4004F PT TOBACCO SCREEN RCVD TLK: CPT | Performed by: UROLOGY

## 2023-03-20 PROCEDURE — G8427 DOCREV CUR MEDS BY ELIG CLIN: HCPCS | Performed by: UROLOGY

## 2023-03-20 PROCEDURE — G8417 CALC BMI ABV UP PARAM F/U: HCPCS | Performed by: UROLOGY

## 2023-03-20 PROCEDURE — G8484 FLU IMMUNIZE NO ADMIN: HCPCS | Performed by: UROLOGY

## 2023-03-20 PROCEDURE — 81002 URINALYSIS NONAUTO W/O SCOPE: CPT | Performed by: UROLOGY

## 2023-03-20 ASSESSMENT — ENCOUNTER SYMPTOMS
CONSTIPATION: 0
ABDOMINAL PAIN: 0
BACK PAIN: 0
ABDOMINAL DISTENTION: 0
DIARRHEA: 0
COUGH: 0
NAUSEA: 0
VOMITING: 0
SHORTNESS OF BREATH: 0
TROUBLE SWALLOWING: 0
EYE DISCHARGE: 0
EYE REDNESS: 0

## 2023-03-20 NOTE — PROGRESS NOTES
Musculoskeletal:  Negative for back pain and gait problem. Skin:  Negative for pallor and wound. Allergic/Immunologic: Negative for environmental allergies and immunocompromised state. Neurological:  Negative for dizziness and weakness. Hematological:  Negative for adenopathy. Does not bruise/bleed easily. Psychiatric/Behavioral:  Negative for agitation and confusion. PHYSICAL EXAM:  Temp 99 °F (37.2 °C) (Temporal)   Ht 6' (1.829 m)   Wt 210 lb 3.2 oz (95.3 kg)   BMI 28.51 kg/m²   Physical Exam        DATA:    Results for orders placed or performed in visit on 03/20/23   POCT Urinalysis no Micro   Result Value Ref Range    Color, UA yellow     Clarity, UA clear     Glucose, UA POC neg     Bilirubin, UA neg     Ketones, UA neg     Spec Grav, UA 1.015     Blood, UA POC neg     pH, UA 5.5     Protein, UA POC neg     Urobilinogen, UA 0.2     Leukocytes, UA neg     Nitrite, UA neg     Appearance, Fluid Clear Clear, Slightly Cloudy     Lab Results   Component Value Date    PSA 0.07 03/14/2023    PSA 0.07 09/06/2022    PSA 0.05 02/15/2022    PSA 0.05 08/09/2021    PSA 0.04 12/14/2020       1. Malignant neoplasm of prostate (HCC)  PSA very low slightly detectable and stable continue to monitor. Patient states in the future he will be moving to [facility] [place] and asked about referral.  I told him once he is moved his to let us know we will be glad to make the referral  - POCT Urinalysis no Micro  - PSA, Diagnostic; Future      Orders Placed This Encounter   Procedures    PSA, Diagnostic     PSA in 6 month     Standing Status:   Future     Standing Expiration Date:   3/19/2024    POCT Urinalysis no Micro        Return in about 6 months (around 9/20/2023) for PSA prior to vext visit.     All information inputted into the note by the MA to include chief complaint, past medical history, past surgical history, medications, allergies, social and family history and review of systems has been reviewed and updated as

## 2023-04-20 ENCOUNTER — HOSPITAL ENCOUNTER (OUTPATIENT)
Dept: NON INVASIVE DIAGNOSTICS | Age: 78
Discharge: HOME | End: 2023-04-20
Payer: MEDICARE

## 2023-04-20 ENCOUNTER — OFFICE VISIT (OUTPATIENT)
Dept: VASCULAR SURGERY | Age: 78
End: 2023-04-20
Payer: MEDICARE

## 2023-04-20 ENCOUNTER — HOSPITAL ENCOUNTER (OUTPATIENT)
Dept: CT IMAGING | Age: 78
Discharge: HOME | End: 2023-04-20
Payer: MEDICARE

## 2023-04-20 ENCOUNTER — APPOINTMENT (OUTPATIENT)
Dept: CT IMAGING | Age: 78
End: 2023-04-20
Payer: MEDICARE

## 2023-04-20 VITALS
OXYGEN SATURATION: 98 % | SYSTOLIC BLOOD PRESSURE: 120 MMHG | HEART RATE: 59 BPM | TEMPERATURE: 97.2 F | DIASTOLIC BLOOD PRESSURE: 66 MMHG

## 2023-04-20 DIAGNOSIS — I70.213 ATHEROSCLER OF NATIVE ARTERY OF BOTH LEGS WITH INTERMIT CLAUDICATION (HCC): ICD-10-CM

## 2023-04-20 DIAGNOSIS — I71.43 INFRARENAL ABDOMINAL AORTIC ANEURYSM (AAA) WITHOUT RUPTURE (HCC): Primary | ICD-10-CM

## 2023-04-20 DIAGNOSIS — I71.43 INFRARENAL ABDOMINAL AORTIC ANEURYSM (AAA) WITHOUT RUPTURE (HCC): ICD-10-CM

## 2023-04-20 PROCEDURE — 99214 OFFICE O/P EST MOD 30 MIN: CPT | Performed by: NURSE PRACTITIONER

## 2023-04-20 PROCEDURE — 4004F PT TOBACCO SCREEN RCVD TLK: CPT | Performed by: NURSE PRACTITIONER

## 2023-04-20 PROCEDURE — 93923 UPR/LXTR ART STDY 3+ LVLS: CPT

## 2023-04-20 PROCEDURE — G8427 DOCREV CUR MEDS BY ELIG CLIN: HCPCS | Performed by: NURSE PRACTITIONER

## 2023-04-20 PROCEDURE — 74176 CT ABD & PELVIS W/O CONTRAST: CPT | Performed by: RADIOLOGY

## 2023-04-20 PROCEDURE — 74176 CT ABD & PELVIS W/O CONTRAST: CPT

## 2023-04-20 PROCEDURE — G8417 CALC BMI ABV UP PARAM F/U: HCPCS | Performed by: NURSE PRACTITIONER

## 2023-04-20 PROCEDURE — 3074F SYST BP LT 130 MM HG: CPT | Performed by: NURSE PRACTITIONER

## 2023-04-20 PROCEDURE — 1123F ACP DISCUSS/DSCN MKR DOCD: CPT | Performed by: NURSE PRACTITIONER

## 2023-04-20 PROCEDURE — 3078F DIAST BP <80 MM HG: CPT | Performed by: NURSE PRACTITIONER

## 2023-04-25 DIAGNOSIS — R49.0 HOARSENESS: ICD-10-CM

## 2023-04-25 DIAGNOSIS — I71.43 INFRARENAL ABDOMINAL AORTIC ANEURYSM (AAA) WITHOUT RUPTURE (HCC): Primary | ICD-10-CM

## 2023-04-25 DIAGNOSIS — J38.3 VOCAL CORDS SWELLING: ICD-10-CM

## 2023-04-25 DIAGNOSIS — R49.0 HOARSENESS: Primary | ICD-10-CM

## 2023-05-01 ENCOUNTER — TELEPHONE (OUTPATIENT)
Dept: VASCULAR SURGERY | Age: 78
End: 2023-05-01

## 2023-05-01 NOTE — TELEPHONE ENCOUNTER
ENT referral made. CTA aorta with runoff (pre and post IV fluids ordered. We will await results of CTA with runoff and plan on having: Upload images to see if patient is candidate for EVAR.

## 2023-05-02 ENCOUNTER — HOSPITAL ENCOUNTER (OUTPATIENT)
Dept: CT IMAGING | Age: 78
Discharge: HOME | End: 2023-05-02
Payer: MEDICARE

## 2023-05-02 VITALS
DIASTOLIC BLOOD PRESSURE: 68 MMHG | SYSTOLIC BLOOD PRESSURE: 153 MMHG | HEART RATE: 57 BPM | OXYGEN SATURATION: 100 % | RESPIRATION RATE: 16 BRPM

## 2023-05-02 DIAGNOSIS — I71.43 INFRARENAL ABDOMINAL AORTIC ANEURYSM (AAA) WITHOUT RUPTURE (HCC): ICD-10-CM

## 2023-05-02 PROCEDURE — 75635 CT ANGIO ABDOMINAL ARTERIES: CPT | Performed by: RADIOLOGY

## 2023-05-02 PROCEDURE — 6360000004 HC RX CONTRAST MEDICATION: Performed by: PHYSICIAN ASSISTANT

## 2023-05-02 PROCEDURE — 2500000003 HC RX 250 WO HCPCS: Performed by: PHYSICIAN ASSISTANT

## 2023-05-02 PROCEDURE — 75635 CT ANGIO ABDOMINAL ARTERIES: CPT

## 2023-05-02 PROCEDURE — 2580000003 HC RX 258: Performed by: PHYSICIAN ASSISTANT

## 2023-05-02 RX ADMIN — IOPAMIDOL 70 ML: 755 INJECTION, SOLUTION INTRAVENOUS at 10:41

## 2023-05-02 RX ADMIN — SODIUM BICARBONATE: 84 INJECTION, SOLUTION INTRAVENOUS at 08:22

## 2023-05-02 NOTE — PROGRESS NOTES
Mr. Cherry Guzman is here today for a CTA with IV fluids pre and post test.  Pt escorted to HR 1. Procedure explained and IV started. Vitals stable.

## 2023-05-03 ENCOUNTER — TELEPHONE (OUTPATIENT)
Dept: VASCULAR SURGERY | Age: 78
End: 2023-05-03

## 2023-05-03 ENCOUNTER — TELEPHONE (OUTPATIENT)
Dept: ENT CLINIC | Age: 78
End: 2023-05-03

## 2023-05-03 NOTE — TELEPHONE ENCOUNTER
Spoke with the patient to assure he was aware of his Ent appointment change for in the morning. The patient is aware he is to be at ENT tomorrow.

## 2023-05-04 ENCOUNTER — OFFICE VISIT (OUTPATIENT)
Dept: ENT CLINIC | Age: 78
End: 2023-05-04
Payer: MEDICARE

## 2023-05-04 VITALS
SYSTOLIC BLOOD PRESSURE: 140 MMHG | WEIGHT: 210 LBS | BODY MASS INDEX: 28.44 KG/M2 | DIASTOLIC BLOOD PRESSURE: 68 MMHG | HEIGHT: 72 IN

## 2023-05-04 DIAGNOSIS — R49.0 HOARSENESS: Primary | ICD-10-CM

## 2023-05-04 PROCEDURE — G8417 CALC BMI ABV UP PARAM F/U: HCPCS | Performed by: OTOLARYNGOLOGY

## 2023-05-04 PROCEDURE — 3078F DIAST BP <80 MM HG: CPT | Performed by: OTOLARYNGOLOGY

## 2023-05-04 PROCEDURE — 3077F SYST BP >= 140 MM HG: CPT | Performed by: OTOLARYNGOLOGY

## 2023-05-04 PROCEDURE — G8427 DOCREV CUR MEDS BY ELIG CLIN: HCPCS | Performed by: OTOLARYNGOLOGY

## 2023-05-04 PROCEDURE — 31575 DIAGNOSTIC LARYNGOSCOPY: CPT | Performed by: OTOLARYNGOLOGY

## 2023-05-04 PROCEDURE — 1123F ACP DISCUSS/DSCN MKR DOCD: CPT | Performed by: OTOLARYNGOLOGY

## 2023-05-04 PROCEDURE — 4004F PT TOBACCO SCREEN RCVD TLK: CPT | Performed by: OTOLARYNGOLOGY

## 2023-05-04 PROCEDURE — 99203 OFFICE O/P NEW LOW 30 MIN: CPT | Performed by: OTOLARYNGOLOGY

## 2023-05-04 ASSESSMENT — ENCOUNTER SYMPTOMS
EYES NEGATIVE: 1
RESPIRATORY NEGATIVE: 1
ALLERGIC/IMMUNOLOGIC NEGATIVE: 1
GASTROINTESTINAL NEGATIVE: 1

## 2023-05-04 NOTE — PROGRESS NOTES
2023    Missy Washington (:  1945) is a 69 y.o. male, Established patient, here for evaluation of the following chief complaint(s):  New Patient (Hx of hoarseness after intubation from procedure)      Vitals:    23 1109   BP: (!) 140/68   Weight: 210 lb (95.3 kg)   Height: 6' (1.829 m)       Wt Readings from Last 3 Encounters:   23 210 lb (95.3 kg)   23 210 lb 3.2 oz (95.3 kg)   22 217 lb (98.4 kg)       BP Readings from Last 3 Encounters:   23 (!) 140/68   23 (!) 153/68   23 120/66         SUBJECTIVE/OBJECTIVE:    Patient seen today for hoarseness. He has a history of a carotid procedure was significantly hoarse for 10 months after this. It is mostly improved but is occasionally hoarse. He needs to have surgery on his aorta and they want a make sure his airway is good for this. He does smoke. Review of Systems   Constitutional: Negative. HENT: Negative. Eyes: Negative. Respiratory: Negative. Cardiovascular: Negative. Gastrointestinal: Negative. Endocrine: Negative. Musculoskeletal: Negative. Skin: Negative. Allergic/Immunologic: Negative. Neurological: Negative. Hematological: Negative. Psychiatric/Behavioral: Negative. Physical Exam  Vitals reviewed. Constitutional:       Appearance: Normal appearance. He is normal weight. HENT:      Head: Normocephalic and atraumatic. Right Ear: Tympanic membrane, ear canal and external ear normal.      Left Ear: Tympanic membrane, ear canal and external ear normal.      Nose: Nose normal.      Mouth/Throat:      Mouth: Mucous membranes are moist.      Pharynx: Oropharynx is clear. Eyes:      Extraocular Movements: Extraocular movements intact. Pupils: Pupils are equal, round, and reactive to light. Cardiovascular:      Rate and Rhythm: Normal rate and regular rhythm.    Pulmonary:      Effort: Pulmonary effort is normal.      Breath sounds: Normal

## 2023-05-05 ENCOUNTER — TELEPHONE (OUTPATIENT)
Dept: VASCULAR SURGERY | Age: 78
End: 2023-05-05

## 2023-05-05 NOTE — TELEPHONE ENCOUNTER
Spoke with the patient to let him know we are getting cardiac clearance and have sent the information to the [facility] rep for approval of surgery. I told him we would be in touch next week to schedule an appointment for him to discuss with [facility] and Dr. Hussain Brown. Patient acknowledged.

## 2023-05-05 NOTE — TELEPHONE ENCOUNTER
Marcus Carlos requests that clinic return their call. The best time to reach him is Anytime. Marcus Carlos is returning a phone call from this clinic. He stated that it was from [facility]. Please contact Gustavo to advise. Thank you.

## 2023-05-10 ENCOUNTER — TELEPHONE (OUTPATIENT)
Dept: CARDIOLOGY | Facility: CLINIC | Age: 78
End: 2023-05-10
Payer: MEDICARE

## 2023-05-10 NOTE — TELEPHONE ENCOUNTER
The [facility] received a fax that requires your attention. The document has been indexed to the patient’s chart for your review.      Reason for sending: DR. WARD IS REQUESTING A RISK STRATIFICATION ALONG WITH CARDIAC CLEARANCE PRIOR TO PATIENT'S SURGERY.    Documents Description: EXT MED RECS_ERASMO GUERRA, [id number]    Name of Sender: PRABHJOT HEARD    Date Indexed: 05-10-23

## 2023-05-10 NOTE — TELEPHONE ENCOUNTER
Patient has been seen here since 2021, I gave [facility] a call and let them know they may want to contact the patient to see if he has been seen at another  clinic if not then he would need to be seen first.

## 2023-05-25 ENCOUNTER — HOSPITAL ENCOUNTER (OUTPATIENT)
Dept: PREADMISSION TESTING | Age: 78
Discharge: HOME | End: 2023-05-29
Payer: MEDICARE

## 2023-05-25 ENCOUNTER — HOSPITAL ENCOUNTER (OUTPATIENT)
Dept: GENERAL RADIOLOGY | Age: 78
Discharge: HOME | End: 2023-05-25
Payer: MEDICARE

## 2023-05-25 ENCOUNTER — OFFICE VISIT (OUTPATIENT)
Dept: VASCULAR SURGERY | Age: 78
End: 2023-05-25
Payer: MEDICARE

## 2023-05-25 VITALS
WEIGHT: 210 LBS | HEART RATE: 64 BPM | HEIGHT: 72 IN | TEMPERATURE: 97.9 F | BODY MASS INDEX: 28.44 KG/M2 | DIASTOLIC BLOOD PRESSURE: 68 MMHG | SYSTOLIC BLOOD PRESSURE: 140 MMHG | OXYGEN SATURATION: 100 %

## 2023-05-25 VITALS — BODY MASS INDEX: 27.94 KG/M2 | WEIGHT: 206 LBS

## 2023-05-25 DIAGNOSIS — Z01.818 PRE-OP TESTING: Primary | ICD-10-CM

## 2023-05-25 DIAGNOSIS — Z01.818 PRE-OP TESTING: ICD-10-CM

## 2023-05-25 DIAGNOSIS — I71.43 INFRARENAL ABDOMINAL AORTIC ANEURYSM (AAA) WITHOUT RUPTURE (HCC): Primary | ICD-10-CM

## 2023-05-25 LAB
ABO + RH BLD: NORMAL
ANION GAP SERPL CALCULATED.3IONS-SCNC: 11 MMOL/L (ref 7–19)
APTT PPP: 29.2 SEC (ref 26–36.2)
BLD GP AB SCN SERPL QL: NORMAL
BUN SERPL-MCNC: 41 MG/DL (ref 8–23)
CALCIUM SERPL-MCNC: 10.2 MG/DL (ref 8.8–10.2)
CHLORIDE SERPL-SCNC: 103 MMOL/L (ref 98–111)
CO2 SERPL-SCNC: 25 MMOL/L (ref 22–29)
CREAT SERPL-MCNC: 1.8 MG/DL (ref 0.5–1.2)
EKG P AXIS: 67 DEGREES
EKG P-R INTERVAL: 180 MS
EKG Q-T INTERVAL: 452 MS
EKG QRS DURATION: 120 MS
EKG QTC CALCULATION (BAZETT): 438 MS
EKG T AXIS: 72 DEGREES
ERYTHROCYTE [DISTWIDTH] IN BLOOD BY AUTOMATED COUNT: 12.8 % (ref 11.5–14.5)
GLUCOSE SERPL-MCNC: 89 MG/DL (ref 74–109)
HCT VFR BLD AUTO: 42.5 % (ref 42–52)
HGB BLD-MCNC: 14.4 G/DL (ref 14–18)
INR PPP: 1.05 (ref 0.88–1.18)
MCH RBC QN AUTO: 34.6 PG (ref 27–31)
MCHC RBC AUTO-ENTMCNC: 33.9 G/DL (ref 33–37)
MCV RBC AUTO: 102.2 FL (ref 80–94)
MRSA DNA SPEC QL NAA+PROBE: NOT DETECTED
PLATELET # BLD AUTO: 203 K/UL (ref 130–400)
PMV BLD AUTO: 11.5 FL (ref 9.4–12.4)
POTASSIUM SERPL-SCNC: 3.9 MMOL/L (ref 3.5–5)
PROTHROMBIN TIME: 13.3 SEC (ref 12–14.6)
RBC # BLD AUTO: 4.16 M/UL (ref 4.7–6.1)
SODIUM SERPL-SCNC: 139 MMOL/L (ref 136–145)
WBC # BLD AUTO: 8.5 K/UL (ref 4.8–10.8)

## 2023-05-25 PROCEDURE — 85730 THROMBOPLASTIN TIME PARTIAL: CPT

## 2023-05-25 PROCEDURE — G8417 CALC BMI ABV UP PARAM F/U: HCPCS | Performed by: NURSE PRACTITIONER

## 2023-05-25 PROCEDURE — G8427 DOCREV CUR MEDS BY ELIG CLIN: HCPCS | Performed by: NURSE PRACTITIONER

## 2023-05-25 PROCEDURE — 71046 X-RAY EXAM CHEST 2 VIEWS: CPT | Performed by: RADIOLOGY

## 2023-05-25 PROCEDURE — 85610 PROTHROMBIN TIME: CPT

## 2023-05-25 PROCEDURE — 3078F DIAST BP <80 MM HG: CPT | Performed by: NURSE PRACTITIONER

## 2023-05-25 PROCEDURE — 71046 X-RAY EXAM CHEST 2 VIEWS: CPT

## 2023-05-25 PROCEDURE — 85027 COMPLETE CBC AUTOMATED: CPT

## 2023-05-25 PROCEDURE — 4004F PT TOBACCO SCREEN RCVD TLK: CPT | Performed by: NURSE PRACTITIONER

## 2023-05-25 PROCEDURE — 87641 MR-STAPH DNA AMP PROBE: CPT

## 2023-05-25 PROCEDURE — 86900 BLOOD TYPING SEROLOGIC ABO: CPT

## 2023-05-25 PROCEDURE — 93010 ELECTROCARDIOGRAM REPORT: CPT | Performed by: INTERNAL MEDICINE

## 2023-05-25 PROCEDURE — 1123F ACP DISCUSS/DSCN MKR DOCD: CPT | Performed by: NURSE PRACTITIONER

## 2023-05-25 PROCEDURE — 93005 ELECTROCARDIOGRAM TRACING: CPT

## 2023-05-25 PROCEDURE — 99214 OFFICE O/P EST MOD 30 MIN: CPT | Performed by: NURSE PRACTITIONER

## 2023-05-25 PROCEDURE — 86901 BLOOD TYPING SEROLOGIC RH(D): CPT

## 2023-05-25 PROCEDURE — 3074F SYST BP LT 130 MM HG: CPT | Performed by: NURSE PRACTITIONER

## 2023-05-25 PROCEDURE — 86850 RBC ANTIBODY SCREEN: CPT

## 2023-05-25 PROCEDURE — 80048 BASIC METABOLIC PNL TOTAL CA: CPT

## 2023-05-26 RX ORDER — SODIUM CHLORIDE 0.9 % (FLUSH) 0.9 %
5-40 SYRINGE (ML) INJECTION EVERY 12 HOURS SCHEDULED
OUTPATIENT
Start: 2023-05-26

## 2023-05-26 RX ORDER — ASPIRIN 81 MG/1
81 TABLET ORAL ONCE
OUTPATIENT
Start: 2023-05-26 | End: 2023-05-26

## 2023-05-26 RX ORDER — SODIUM CHLORIDE 0.9 % (FLUSH) 0.9 %
5-40 SYRINGE (ML) INJECTION PRN
OUTPATIENT
Start: 2023-05-26

## 2023-05-26 RX ORDER — SODIUM CHLORIDE 9 MG/ML
INJECTION, SOLUTION INTRAVENOUS PRN
OUTPATIENT
Start: 2023-05-26

## 2023-05-26 NOTE — PROGRESS NOTES
normal.      CT -   Slightly enlarged lobulated abdominal aortic aneurysm currently measuring 4.8 x   4.7 cm, previously 4.5 x 4.4 cm. On series 2, image 312, this measures 5.0 cm by my measurement  after I individually reviewed the images, I see increase in size of the aneurysm. Individual films reviewed:  Yes. These results have been reviewed with the patient. Disease process is chronic illness with exacerbation, progression, or side effects of treatment  By review with Dr. Dondra Kocher and myself, we measure 5.1 cm. These images and sizes were reviewed with patient and his wife. Reviewed previous studies including: nora and CT scan  Individual images were reviewed. Results were discussed with the patient. Options have been discussed with the patient including continued medical management vs. proceeding with considering EVAR. Patient has opted to proceed with consider this. Risks have been discussed with the patient including but not limited to MI, death, CVA, bleed, nerve injury, contrast nephropathy, atheroemboli, and infection. .            ASSESSMENT/PLAN:  1. Infrarenal abdominal aortic aneurysm (AAA) without rupture [facility])          Discussed management of CT scan and leawhich includes:  continue asa, plavix, and lipitor to reduce risk of arterial thrombosis and to decrease rate of plaque buildup  Strongly encourage start/continue statin therapy -   Recommend no smoking   Proceed with EVAR  Symptoms of rupture reviewed with the patient including sudden onset severe back pain or abdominal pain. This pain can sometimes radiate into the groin or leg. The patient may experience a feeling of impending doom or death. If this occurs they have been insturcted to call 911 and get to the emergency room telling them you have an aneurysm. Patient has voiced understanding. An electronic signature was used to authenticate this note.     --ANANT Gale

## 2023-05-26 NOTE — H&P
Eliza Sanchez (:  [id number]) is a 69 y.o. male,New patient, here for evaluation of the following chief complaint(s):  Follow-up (To discuss surgery with [facility] and Dr. Dale Nails. )            SUBJECTIVE/OBJECTIVE:  He has a known history of abdominal aortic aneurysm for 1 - 5 years. He has not had abdominal pain. He has back pain in the lumbar spine and sacral spine region. This pain is unchanged since the last visit. This is chronic. He has no new pain. Huey Waterman has a history of peripheral vascular disease of the lower extremities. He has had this for 1 - 5 years. Current treatment includes clopidogrel 75 mg po qd, ASA EC daily. Huey Waterman has not had new wounds. Recently, he reports claudication at a distance of  which vareis. Huey Waterman reports that the right leg is equal to the left. He reports claudication is not changed and is mostly in the form of crampy type pain starting in the calves. He has a short recovery time. This is reproducible in nature. He reports ischemic rest pain 0 times per night. He reports walking with cart does not help.  His legs are stable      I have personally reviewed the following: problem list, current meds, allergies, PMH, PSH, family hx, and social hx  Elzia Sanchez is a 69 y.o. male with the following history as recorded in [facility]:  Patient Active Problem List    Diagnosis Date Noted    Memory loss 2017    Abnormal brain scan 2017    Carotid disease, bilateral ([address] 2017    Paroxysmal atrial fibrillation ([address] 04/15/2017    Coronary artery disease involving native coronary artery of native heart without angina pectoris     Hyperlipidemia     Hyperthyroidism     Right BBB/left ant fasc block     Essential hypertension     Sleep apnea     AAA (abdominal aortic aneurysm) without rupture ([address]     Atherosclerosis of native artery of both lower extremities with intermittent claudication ([address]     Bilateral carotid artery stenosis     Malignant neoplasm Moderna dose 1

## 2023-06-05 ENCOUNTER — HOSPITAL ENCOUNTER (INPATIENT)
Age: 78
LOS: 1 days | Discharge: HOME | DRG: 269 | End: 2023-06-06
Attending: SURGERY | Admitting: SURGERY
Payer: MEDICARE

## 2023-06-05 ENCOUNTER — ANESTHESIA (OUTPATIENT)
Dept: OPERATING ROOM | Age: 78
DRG: 269 | End: 2023-06-05
Payer: MEDICARE

## 2023-06-05 ENCOUNTER — ANESTHESIA EVENT (OUTPATIENT)
Dept: OPERATING ROOM | Age: 78
DRG: 269 | End: 2023-06-05
Payer: MEDICARE

## 2023-06-05 ENCOUNTER — APPOINTMENT (OUTPATIENT)
Dept: INTERVENTIONAL RADIOLOGY/VASCULAR | Age: 78
DRG: 269 | End: 2023-06-05
Attending: SURGERY
Payer: MEDICARE

## 2023-06-05 LAB
ABO + RH BLD: NORMAL
BLD GP AB SCN SERPL QL: NORMAL
ERYTHROCYTE [DISTWIDTH] IN BLOOD BY AUTOMATED COUNT: 12.9 % (ref 11.5–14.5)
HCT VFR BLD AUTO: 37.6 % (ref 42–52)
HGB BLD-MCNC: 12.8 G/DL (ref 14–18)
MCH RBC QN AUTO: 35 PG (ref 27–31)
MCHC RBC AUTO-ENTMCNC: 34 G/DL (ref 33–37)
MCV RBC AUTO: 102.7 FL (ref 80–94)
PLATELET # BLD AUTO: 169 K/UL (ref 130–400)
PMV BLD AUTO: 11.1 FL (ref 9.4–12.4)
RBC # BLD AUTO: 3.66 M/UL (ref 4.7–6.1)
WBC # BLD AUTO: 9.5 K/UL (ref 4.8–10.8)

## 2023-06-05 PROCEDURE — 7100000000 HC PACU RECOVERY - FIRST 15 MIN: Performed by: SURGERY

## 2023-06-05 PROCEDURE — 1210000000 HC MED SURG R&B

## 2023-06-05 PROCEDURE — C1760 CLOSURE DEV, VASC: HCPCS | Performed by: SURGERY

## 2023-06-05 PROCEDURE — 2700000000 HC OXYGEN THERAPY PER DAY

## 2023-06-05 PROCEDURE — 2580000003 HC RX 258: Performed by: SURGERY

## 2023-06-05 PROCEDURE — 36415 COLL VENOUS BLD VENIPUNCTURE: CPT

## 2023-06-05 PROCEDURE — 94760 N-INVAS EAR/PLS OXIMETRY 1: CPT

## 2023-06-05 PROCEDURE — 86850 RBC ANTIBODY SCREEN: CPT

## 2023-06-05 PROCEDURE — 04V03DZ RESTRICTION OF ABDOMINAL AORTA WITH INTRALUMINAL DEVICE, PERCUTANEOUS APPROACH: ICD-10-PCS | Performed by: SURGERY

## 2023-06-05 PROCEDURE — C1887 CATHETER, GUIDING: HCPCS | Performed by: SURGERY

## 2023-06-05 PROCEDURE — 86901 BLOOD TYPING SEROLOGIC RH(D): CPT

## 2023-06-05 PROCEDURE — 3600000017 HC SURGERY HYBRID ADDL 15MIN: Performed by: SURGERY

## 2023-06-05 PROCEDURE — C1893 INTRO/SHEATH, FIXED,NON-PEEL: HCPCS | Performed by: SURGERY

## 2023-06-05 PROCEDURE — 6370000000 HC RX 637 (ALT 250 FOR IP): Performed by: SURGERY

## 2023-06-05 PROCEDURE — 03HY32Z INSERTION OF MONITORING DEVICE INTO UPPER ARTERY, PERCUTANEOUS APPROACH: ICD-10-PCS | Performed by: SURGERY

## 2023-06-05 PROCEDURE — 34713 PERQ ACCESS & CLSR FEM ART: CPT | Performed by: SURGERY

## 2023-06-05 PROCEDURE — 2500000003 HC RX 250 WO HCPCS

## 2023-06-05 PROCEDURE — 2580000003 HC RX 258

## 2023-06-05 PROCEDURE — 7100000001 HC PACU RECOVERY - ADDTL 15 MIN: Performed by: SURGERY

## 2023-06-05 PROCEDURE — 2709999900 HC NON-CHARGEABLE SUPPLY: Performed by: SURGERY

## 2023-06-05 PROCEDURE — C2628 CATHETER, OCCLUSION: HCPCS | Performed by: SURGERY

## 2023-06-05 PROCEDURE — 3700000000 HC ANESTHESIA ATTENDED CARE: Performed by: SURGERY

## 2023-06-05 PROCEDURE — 6360000002 HC RX W HCPCS: Performed by: SURGERY

## 2023-06-05 PROCEDURE — 85027 COMPLETE CBC AUTOMATED: CPT

## 2023-06-05 PROCEDURE — 2580000003 HC RX 258: Performed by: NURSE PRACTITIONER

## 2023-06-05 PROCEDURE — 86900 BLOOD TYPING SEROLOGIC ABO: CPT

## 2023-06-05 PROCEDURE — 6360000002 HC RX W HCPCS: Performed by: NURSE PRACTITIONER

## 2023-06-05 PROCEDURE — C1725 CATH, TRANSLUMIN NON-LASER: HCPCS | Performed by: SURGERY

## 2023-06-05 PROCEDURE — 94150 VITAL CAPACITY TEST: CPT

## 2023-06-05 PROCEDURE — 6370000000 HC RX 637 (ALT 250 FOR IP): Performed by: ANESTHESIOLOGY

## 2023-06-05 PROCEDURE — 6360000002 HC RX W HCPCS: Performed by: ANESTHESIOLOGY

## 2023-06-05 PROCEDURE — 34705 EVAC RPR A-BIILIAC NDGFT: CPT | Performed by: SURGERY

## 2023-06-05 PROCEDURE — 6370000000 HC RX 637 (ALT 250 FOR IP): Performed by: NURSE PRACTITIONER

## 2023-06-05 PROCEDURE — 6360000002 HC RX W HCPCS

## 2023-06-05 PROCEDURE — A4217 STERILE WATER/SALINE, 500 ML: HCPCS | Performed by: SURGERY

## 2023-06-05 PROCEDURE — C1768 GRAFT, VASCULAR: HCPCS | Performed by: SURGERY

## 2023-06-05 PROCEDURE — 2580000003 HC RX 258: Performed by: ANESTHESIOLOGY

## 2023-06-05 PROCEDURE — C1769 GUIDE WIRE: HCPCS | Performed by: SURGERY

## 2023-06-05 PROCEDURE — 3600000007 HC SURGERY HYBRID BASE: Performed by: SURGERY

## 2023-06-05 PROCEDURE — C1894 INTRO/SHEATH, NON-LASER: HCPCS | Performed by: SURGERY

## 2023-06-05 PROCEDURE — 3700000001 HC ADD 15 MINUTES (ANESTHESIA): Performed by: SURGERY

## 2023-06-05 DEVICE — GRAFT ENDOPROS L14CM DST DIA12MM CONTRALATERAL LEG W/ C3: Type: IMPLANTABLE DEVICE | Site: AORTA | Status: FUNCTIONAL

## 2023-06-05 DEVICE — IMPLANTABLE DEVICE: Type: IMPLANTABLE DEVICE | Status: FUNCTIONAL

## 2023-06-05 DEVICE — GRAFT STENT TRUNK IPSILATERAL LEG 32X14.5 MMX14 CM EXCLUDER: Type: IMPLANTABLE DEVICE | Site: AORTA | Status: FUNCTIONAL

## 2023-06-05 DEVICE — GRAFT ENDOPROS L10CM DST DIA12MM CONTRALATERAL LEG W/ C3: Type: IMPLANTABLE DEVICE | Site: AORTA | Status: FUNCTIONAL

## 2023-06-05 RX ORDER — SODIUM CHLORIDE 0.9 % (FLUSH) 0.9 %
5-40 SYRINGE (ML) INJECTION PRN
Status: DISCONTINUED | OUTPATIENT
Start: 2023-06-05 | End: 2023-06-05 | Stop reason: HOSPADM

## 2023-06-05 RX ORDER — SODIUM CHLORIDE 0.9 % (FLUSH) 0.9 %
5-40 SYRINGE (ML) INJECTION EVERY 12 HOURS SCHEDULED
Status: DISCONTINUED | OUTPATIENT
Start: 2023-06-05 | End: 2023-06-05 | Stop reason: HOSPADM

## 2023-06-05 RX ORDER — FENTANYL CITRATE 50 UG/ML
25 INJECTION, SOLUTION INTRAMUSCULAR; INTRAVENOUS EVERY 5 MIN PRN
Status: DISCONTINUED | OUTPATIENT
Start: 2023-06-05 | End: 2023-06-05 | Stop reason: HOSPADM

## 2023-06-05 RX ORDER — PROPOFOL 10 MG/ML
INJECTION, EMULSION INTRAVENOUS PRN
Status: DISCONTINUED | OUTPATIENT
Start: 2023-06-05 | End: 2023-06-05 | Stop reason: SDUPTHER

## 2023-06-05 RX ORDER — ATENOLOL 25 MG/1
25 TABLET ORAL DAILY
Status: DISCONTINUED | OUTPATIENT
Start: 2023-06-06 | End: 2023-06-06 | Stop reason: HOSPADM

## 2023-06-05 RX ORDER — FENTANYL CITRATE 50 UG/ML
100 INJECTION, SOLUTION INTRAMUSCULAR; INTRAVENOUS ONCE
Status: COMPLETED | OUTPATIENT
Start: 2023-06-05 | End: 2023-06-05

## 2023-06-05 RX ORDER — SODIUM CHLORIDE 9 MG/ML
INJECTION, SOLUTION INTRAVENOUS PRN
Status: DISCONTINUED | OUTPATIENT
Start: 2023-06-05 | End: 2023-06-05 | Stop reason: HOSPADM

## 2023-06-05 RX ORDER — ONDANSETRON 2 MG/ML
4 INJECTION INTRAMUSCULAR; INTRAVENOUS
Status: DISCONTINUED | OUTPATIENT
Start: 2023-06-05 | End: 2023-06-05 | Stop reason: HOSPADM

## 2023-06-05 RX ORDER — FENTANYL CITRATE 50 UG/ML
50 INJECTION, SOLUTION INTRAMUSCULAR; INTRAVENOUS
Status: DISCONTINUED | OUTPATIENT
Start: 2023-06-05 | End: 2023-06-05 | Stop reason: HOSPADM

## 2023-06-05 RX ORDER — FENTANYL CITRATE 50 UG/ML
25 INJECTION, SOLUTION INTRAMUSCULAR; INTRAVENOUS
Status: DISCONTINUED | OUTPATIENT
Start: 2023-06-05 | End: 2023-06-05 | Stop reason: HOSPADM

## 2023-06-05 RX ORDER — HEPARIN SODIUM 1000 [USP'U]/ML
INJECTION, SOLUTION INTRAVENOUS; SUBCUTANEOUS PRN
Status: DISCONTINUED | OUTPATIENT
Start: 2023-06-05 | End: 2023-06-05 | Stop reason: SDUPTHER

## 2023-06-05 RX ORDER — DEXAMETHASONE SODIUM PHOSPHATE 10 MG/ML
INJECTION, SOLUTION INTRAMUSCULAR; INTRAVENOUS PRN
Status: DISCONTINUED | OUTPATIENT
Start: 2023-06-05 | End: 2023-06-05 | Stop reason: SDUPTHER

## 2023-06-05 RX ORDER — APREPITANT 40 MG/1
40 CAPSULE ORAL ONCE
Status: COMPLETED | OUTPATIENT
Start: 2023-06-05 | End: 2023-06-05

## 2023-06-05 RX ORDER — ASPIRIN 81 MG/1
81 TABLET ORAL ONCE
Status: COMPLETED | OUTPATIENT
Start: 2023-06-05 | End: 2023-06-05

## 2023-06-05 RX ORDER — PROCHLORPERAZINE EDISYLATE 5 MG/ML
5 INJECTION INTRAMUSCULAR; INTRAVENOUS
Status: DISCONTINUED | OUTPATIENT
Start: 2023-06-05 | End: 2023-06-05 | Stop reason: HOSPADM

## 2023-06-05 RX ORDER — MIDAZOLAM HYDROCHLORIDE 2 MG/2ML
2 INJECTION, SOLUTION INTRAMUSCULAR; INTRAVENOUS
Status: DISCONTINUED | OUTPATIENT
Start: 2023-06-05 | End: 2023-06-05 | Stop reason: HOSPADM

## 2023-06-05 RX ORDER — FEBUXOSTAT 40 MG/1
40 TABLET, FILM COATED ORAL DAILY
Status: DISCONTINUED | OUTPATIENT
Start: 2023-06-06 | End: 2023-06-06 | Stop reason: HOSPADM

## 2023-06-05 RX ORDER — ONDANSETRON 2 MG/ML
INJECTION INTRAMUSCULAR; INTRAVENOUS PRN
Status: DISCONTINUED | OUTPATIENT
Start: 2023-06-05 | End: 2023-06-05 | Stop reason: SDUPTHER

## 2023-06-05 RX ORDER — FAMOTIDINE 20 MG/1
20 TABLET, FILM COATED ORAL ONCE
Status: COMPLETED | OUTPATIENT
Start: 2023-06-05 | End: 2023-06-05

## 2023-06-05 RX ORDER — LEVOTHYROXINE SODIUM 137 UG/1
137 TABLET ORAL DAILY
Status: DISCONTINUED | OUTPATIENT
Start: 2023-06-06 | End: 2023-06-06 | Stop reason: HOSPADM

## 2023-06-05 RX ORDER — ATORVASTATIN CALCIUM 80 MG/1
80 TABLET, FILM COATED ORAL NIGHTLY
Status: DISCONTINUED | OUTPATIENT
Start: 2023-06-05 | End: 2023-06-06 | Stop reason: HOSPADM

## 2023-06-05 RX ORDER — EZETIMIBE 10 MG/1
10 TABLET ORAL DAILY
Status: DISCONTINUED | OUTPATIENT
Start: 2023-06-06 | End: 2023-06-06 | Stop reason: HOSPADM

## 2023-06-05 RX ORDER — ASPIRIN 325 MG
325 TABLET ORAL NIGHTLY
Status: DISCONTINUED | OUTPATIENT
Start: 2023-06-05 | End: 2023-06-06 | Stop reason: HOSPADM

## 2023-06-05 RX ORDER — FENTANYL CITRATE 50 UG/ML
INJECTION, SOLUTION INTRAMUSCULAR; INTRAVENOUS PRN
Status: DISCONTINUED | OUTPATIENT
Start: 2023-06-05 | End: 2023-06-05 | Stop reason: SDUPTHER

## 2023-06-05 RX ORDER — DIPHENHYDRAMINE HYDROCHLORIDE 50 MG/ML
12.5 INJECTION INTRAMUSCULAR; INTRAVENOUS
Status: DISCONTINUED | OUTPATIENT
Start: 2023-06-05 | End: 2023-06-05 | Stop reason: HOSPADM

## 2023-06-05 RX ORDER — SODIUM CHLORIDE 9 MG/ML
INJECTION, SOLUTION INTRAVENOUS CONTINUOUS PRN
Status: DISCONTINUED | OUTPATIENT
Start: 2023-06-05 | End: 2023-06-05 | Stop reason: SDUPTHER

## 2023-06-05 RX ORDER — SODIUM CHLORIDE 9 MG/ML
INJECTION, SOLUTION INTRAVENOUS CONTINUOUS
Status: DISCONTINUED | OUTPATIENT
Start: 2023-06-05 | End: 2023-06-06

## 2023-06-05 RX ORDER — AMLODIPINE BESYLATE 5 MG/1
10 TABLET ORAL DAILY
Status: DISCONTINUED | OUTPATIENT
Start: 2023-06-06 | End: 2023-06-06 | Stop reason: HOSPADM

## 2023-06-05 RX ORDER — LIDOCAINE HYDROCHLORIDE 10 MG/ML
1 INJECTION, SOLUTION EPIDURAL; INFILTRATION; INTRACAUDAL; PERINEURAL
Status: DISCONTINUED | OUTPATIENT
Start: 2023-06-05 | End: 2023-06-05 | Stop reason: HOSPADM

## 2023-06-05 RX ORDER — LISINOPRIL 20 MG/1
20 TABLET ORAL DAILY
Status: DISCONTINUED | OUTPATIENT
Start: 2023-06-05 | End: 2023-06-06 | Stop reason: HOSPADM

## 2023-06-05 RX ORDER — CLOPIDOGREL BISULFATE 75 MG/1
75 TABLET ORAL DAILY
Status: DISCONTINUED | OUTPATIENT
Start: 2023-06-05 | End: 2023-06-06 | Stop reason: HOSPADM

## 2023-06-05 RX ORDER — SODIUM CHLORIDE 0.9 % (FLUSH) 0.9 %
5-40 SYRINGE (ML) INJECTION EVERY 12 HOURS SCHEDULED
Status: DISCONTINUED | OUTPATIENT
Start: 2023-06-05 | End: 2023-06-06 | Stop reason: HOSPADM

## 2023-06-05 RX ORDER — LIDOCAINE HYDROCHLORIDE 10 MG/ML
INJECTION, SOLUTION EPIDURAL; INFILTRATION; INTRACAUDAL; PERINEURAL PRN
Status: DISCONTINUED | OUTPATIENT
Start: 2023-06-05 | End: 2023-06-05 | Stop reason: SDUPTHER

## 2023-06-05 RX ORDER — SODIUM CHLORIDE, SODIUM LACTATE, POTASSIUM CHLORIDE, CALCIUM CHLORIDE 600; 310; 30; 20 MG/100ML; MG/100ML; MG/100ML; MG/100ML
INJECTION, SOLUTION INTRAVENOUS CONTINUOUS
Status: DISCONTINUED | OUTPATIENT
Start: 2023-06-05 | End: 2023-06-06

## 2023-06-05 RX ORDER — FENTANYL CITRATE 50 UG/ML
50 INJECTION, SOLUTION INTRAMUSCULAR; INTRAVENOUS EVERY 5 MIN PRN
Status: DISCONTINUED | OUTPATIENT
Start: 2023-06-05 | End: 2023-06-05 | Stop reason: HOSPADM

## 2023-06-05 RX ORDER — ROCURONIUM BROMIDE 10 MG/ML
INJECTION, SOLUTION INTRAVENOUS PRN
Status: DISCONTINUED | OUTPATIENT
Start: 2023-06-05 | End: 2023-06-05 | Stop reason: SDUPTHER

## 2023-06-05 RX ORDER — SODIUM CHLORIDE 0.9 % (FLUSH) 0.9 %
5-40 SYRINGE (ML) INJECTION PRN
Status: DISCONTINUED | OUTPATIENT
Start: 2023-06-05 | End: 2023-06-06 | Stop reason: HOSPADM

## 2023-06-05 RX ORDER — ENOXAPARIN SODIUM 100 MG/ML
40 INJECTION SUBCUTANEOUS DAILY
Status: DISCONTINUED | OUTPATIENT
Start: 2023-06-06 | End: 2023-06-06 | Stop reason: HOSPADM

## 2023-06-05 RX ORDER — SODIUM CHLORIDE 9 MG/ML
INJECTION, SOLUTION INTRAVENOUS PRN
Status: DISCONTINUED | OUTPATIENT
Start: 2023-06-05 | End: 2023-06-06 | Stop reason: HOSPADM

## 2023-06-05 RX ADMIN — SODIUM CHLORIDE, POTASSIUM CHLORIDE, SODIUM LACTATE AND CALCIUM CHLORIDE: 600; 310; 30; 20 INJECTION, SOLUTION INTRAVENOUS at 06:33

## 2023-06-05 RX ADMIN — CEFAZOLIN 2000 MG: 2 INJECTION, POWDER, FOR SOLUTION INTRAMUSCULAR; INTRAVENOUS at 08:40

## 2023-06-05 RX ADMIN — HEPARIN SODIUM 2000 UNITS: 1000 INJECTION, SOLUTION INTRAVENOUS; SUBCUTANEOUS at 09:31

## 2023-06-05 RX ADMIN — FENTANYL CITRATE 50 MCG: 0.05 INJECTION, SOLUTION INTRAMUSCULAR; INTRAVENOUS at 08:56

## 2023-06-05 RX ADMIN — SODIUM CHLORIDE: 9 INJECTION, SOLUTION INTRAVENOUS at 12:46

## 2023-06-05 RX ADMIN — ATORVASTATIN CALCIUM 80 MG: 80 TABLET, FILM COATED ORAL at 19:59

## 2023-06-05 RX ADMIN — CLOPIDOGREL BISULFATE 75 MG: 75 TABLET ORAL at 13:07

## 2023-06-05 RX ADMIN — PHENYLEPHRINE HYDROCHLORIDE 50 MCG/MIN: 10 INJECTION INTRAVENOUS at 08:45

## 2023-06-05 RX ADMIN — SODIUM CHLORIDE, PRESERVATIVE FREE 10 ML: 5 INJECTION INTRAVENOUS at 19:59

## 2023-06-05 RX ADMIN — ASPIRIN 81 MG: 81 TABLET, COATED ORAL at 06:19

## 2023-06-05 RX ADMIN — SUGAMMADEX 200 MG: 100 INJECTION, SOLUTION INTRAVENOUS at 11:11

## 2023-06-05 RX ADMIN — SODIUM CHLORIDE: 9 INJECTION, SOLUTION INTRAVENOUS at 20:06

## 2023-06-05 RX ADMIN — APREPITANT 40 MG: 40 CAPSULE ORAL at 07:14

## 2023-06-05 RX ADMIN — ASPIRIN 325 MG: 325 TABLET ORAL at 19:59

## 2023-06-05 RX ADMIN — FENTANYL CITRATE 100 MCG: 0.05 INJECTION, SOLUTION INTRAMUSCULAR; INTRAVENOUS at 08:28

## 2023-06-05 RX ADMIN — PROPOFOL 50 MG: 10 INJECTION, EMULSION INTRAVENOUS at 08:34

## 2023-06-05 RX ADMIN — FAMOTIDINE 20 MG: 20 TABLET ORAL at 07:14

## 2023-06-05 RX ADMIN — HEPARIN SODIUM 3000 UNITS: 1000 INJECTION, SOLUTION INTRAVENOUS; SUBCUTANEOUS at 09:51

## 2023-06-05 RX ADMIN — FENTANYL CITRATE 100 MCG: 50 INJECTION, SOLUTION INTRAMUSCULAR; INTRAVENOUS at 07:56

## 2023-06-05 RX ADMIN — DEXAMETHASONE SODIUM PHOSPHATE 8 MG: 10 INJECTION, SOLUTION INTRAMUSCULAR; INTRAVENOUS at 08:43

## 2023-06-05 RX ADMIN — PROPOFOL 100 MG: 10 INJECTION, EMULSION INTRAVENOUS at 08:32

## 2023-06-05 RX ADMIN — LIDOCAINE HYDROCHLORIDE 50 MG: 10 INJECTION, SOLUTION EPIDURAL; INFILTRATION; INTRACAUDAL; PERINEURAL at 08:32

## 2023-06-05 RX ADMIN — SODIUM CHLORIDE: 9 INJECTION, SOLUTION INTRAVENOUS at 08:25

## 2023-06-05 RX ADMIN — ROCURONIUM BROMIDE 60 MG: 10 INJECTION, SOLUTION INTRAVENOUS at 08:33

## 2023-06-05 RX ADMIN — LIDOCAINE HYDROCHLORIDE 50 MG: 10 INJECTION, SOLUTION EPIDURAL; INFILTRATION; INTRACAUDAL; PERINEURAL at 08:33

## 2023-06-05 RX ADMIN — ONDANSETRON 4 MG: 2 INJECTION INTRAMUSCULAR; INTRAVENOUS at 11:04

## 2023-06-05 ASSESSMENT — ENCOUNTER SYMPTOMS: SHORTNESS OF BREATH: 0

## 2023-06-05 ASSESSMENT — LIFESTYLE VARIABLES: SMOKING_STATUS: 1

## 2023-06-05 ASSESSMENT — PAIN - FUNCTIONAL ASSESSMENT: PAIN_FUNCTIONAL_ASSESSMENT: NONE - DENIES PAIN

## 2023-06-05 NOTE — DISCHARGE INSTRUCTIONS
POST ANGIOGRAM /ENDOVASCULAR  STENTING  INSTRUCTIONS    1. You will be on bedrest the day of your procedure, up around the house and to the bathroom only on the day after your procedure. 2.  You must be transported home by a responsible person after your procedure, YOU [address]. 3.  Do not drive for 1 WEEK  after discharge home. 4.  Drink extra fluids for 24 hours after the procedure to help flush the contrast used (you will make more urine) . 5. Check the groin puncture sites after each activity for bleeding or swelling. 6.  If bleeding occurs, apply pressure to site and call 911 or rush to the nearest emergency room (DO NOT drive yourself!). 7.  Resume normal non-strenuous activity 48 hours after discharge home. 8.  Bruising and soreness at the puncture site may occur, this will heal and clear after several weeks. 9.  Keep your leg (with puncture site) mostly straight while sitting or lying for the first 24 hours after your procedure. 10. No heavy lifting or straining (no more than 10-15 pounds) for 1 WEEK after discharge. 11. Keep the bandage over the puncture sites for 24 hours after discharge home. You may remove the bandage and shower after 24 hours. NO TUB BATH, NO HOT TUB, NO SWIMMING FOR 2 WEEKS.   12.  Once a day for the next 7 days, look at the puncture sites, call physician if you notice:  *  red and/or hot at the puncture site  *  bloody drainage, foul-smelling, yellowish or greenish drainage from the puncture site  *  a very large bruise under/arond the puncture site (often firm to touch)  *  numbness, tingling in foot/leg/or loss of motion/sensation in foot or leg  *  itching/hives on any part of your body; or nausea/vomiting after receiving the dye

## 2023-06-05 NOTE — INTERVAL H&P NOTE
Update History & Physical    The patient's History and Physical of May 26, 2023 was reviewed with the patient and I examined the patient. There was no change. The surgical site was confirmed by the patient and me. Plan: The risks, benefits, expected outcome, and alternative to the recommended procedure have been discussed with the patient. Patient understands and wants to proceed with the procedure.      Electronically signed by Stacy Hyman DO on 6/5/2023 at 7:43 AM

## 2023-06-05 NOTE — ANESTHESIA PRE PROCEDURE
Abnormal global Longitudinal LV strain = -14.6%. Neuro/Psych:   (+) TIA,    (-) seizures and CVA           GI/Hepatic/Renal:   (+) renal disease: CRI,      (-) GERD       Endo/Other:    (+) hyperthyroidism::., no malignancy/cancer. (-) diabetes mellitus, no electrolyte abnormalities, no malignancy/cancer               Abdominal:             Vascular:   + PVD, aortic or cerebral, .  - DVT and PE.       ROS comment: S/p Left CEA    CT abdominal aorta:  IMPRESSION:  Aorta: Infrarenal abdominal aortic aneurysm measuring 4.8 x 4.6 cm as described  above. No evidence of aortic dissection. Moderate to high-grade stenosis at the origin of the celiac axis secondary to  mixed density plaque. Widely patent SMA. Origin of the GARY is not visualized and  may be occluded proximally. Branches of the GARY fill by means of collaterals. Right leg: Extensive atherosclerotic disease of the right SFA as described above  with focal high-grade stenosis proximally and moderate to high-grade stenosis  distally. Right posterior tibial artery is patent to the foot. Left leg: Occluded mid left popliteal artery with collateral filling of the left  posterior tibial artery which is patent down to the foot. Additional findings per body of the report. . Other Findings:           Anesthesia Plan      general     ASA 3     (2nd IV. Arterial line.)  Induction: intravenous. arterial line  MIPS: Postoperative opioids intended and Prophylactic antiemetics administered. Anesthetic plan and risks discussed with patient. Use of blood products discussed with patient whom consented to blood products.                      Laura Augustine DO   6/5/2023

## 2023-06-05 NOTE — ANESTHESIA POSTPROCEDURE EVALUATION
Department of Anesthesiology  Postprocedure Note    Patient: Grisel Molina  MRN: [id number]  Birthdate: [birth year]  Date of evaluation: 6/5/2023      Procedure Summary     Date: 06/05/23 Room / Location: [facility] OR [address]    Anesthesia Start: [address] Anesthesia Stop: 6492    Procedure: ENDOVASCULAR ANEURYSM REPAIR Diagnosis:       Infrarenal abdominal aortic aneurysm (AAA) without rupture (HCC)      (Infrarenal abdominal aortic aneurysm (AAA) without rupture ([facility] [address] [I71.43])    Surgeons: Jigar Jacobs DO Responsible Provider: ANANT Valente CRNA    Anesthesia Type: general ASA Status: 3          Anesthesia Type: No value filed.     Saray Phase I: Saray Score: 10    Saray Phase II:        Anesthesia Post Evaluation    Patient location during evaluation: PACU  Patient participation: complete - patient participated  Level of consciousness: sleepy but conscious  Pain score: 0  Airway patency: patent  Nausea & Vomiting: no vomiting and no nausea  Complications: no  Cardiovascular status: hemodynamically stable  Respiratory status: acceptable and room air  Hydration status: stable

## 2023-06-05 NOTE — OP NOTE
balloon was advanced and inflated throughout the graft. Aortogram was performed showing no endoleak. Bilateral common femoral arteries punctures were closed with pre-deployed pro glides. We will start from the right side and exchanged dry seal to 8 Western Lucila access sheath. Pro glides were deployed initially around the sheath and then around the wire eventually closed up without the wire. The imaging was performed through the still present Omni Flush catheter that showed no stenosis and good seal on the right side. It was then on the left side exchanged from Omni Flush catheter and Bentson wire advanced. Dry seal was exchanged to 8 Western Lucila access sheath and using same maneuver as contralateral side pro glides were secured. Puncture sites were closed using 4-0 Monocryl and steristrips were placed. The patient tolerated the procedure well. He was extubated and brought to the recovery room in good condition.          Electronically signed by Radha Stone DO on 6/5/2023 at 11:43 AM

## 2023-06-05 NOTE — PROGRESS NOTES
Joseph Peguero arrived to room # 329. Presented with: AAA repair  Mental Status: Patient is oriented, alert, and coherent. Vitals:    06/05/23 1223   BP: (!) 119/56   Pulse: 56   Resp: 12   Temp: 98.8 °F (37.1 °C)   SpO2: 98%     Patient safety contract and falls prevention contract reviewed with patient Yes. Oriented Patient and Family to room. Call light within reach. Yes.   Needs, issues or concerns expressed at this time: no.      Electronically signed by Alexa Gutierrez RN on 6/5/2023 at 12:45 PM

## 2023-06-05 NOTE — ANESTHESIA PROCEDURE NOTES
Arterial Line:    An arterial line was placed using ultrasound guidance and surface landmarks, in the pre-op for the following indication(s): continuous blood pressure monitoring and blood sampling needed. A 20 gauge (size), 1 and 3/4 inch (length), Arrow (type) catheter was placed, Seldinger technique used, into the right radial artery and a Avegant Arterial Cannulation Support device was used for positioning, secured by tape and Tegaderm. Anesthesia type: Local  Local infiltration: Injection    Events:  patient tolerated procedure well with no complications and EBL < 5mL.     Additional notes:  Biopatch was placed and covered with tegaderm6/5/2023 8:18 AM6/5/2023 8:18 AM  Anesthesiologist: Katy Cooper,   Performed: Anesthesiologist   Preanesthetic Checklist  Completed: patient identified, IV checked, site marked, risks and benefits discussed, surgical/procedural consents, equipment checked, pre-op evaluation, timeout performed, anesthesia consent given, oxygen available and monitors applied/VS acknowledged

## 2023-06-05 NOTE — PROGRESS NOTES
4 Eyes Skin Assessment    [place] Gentle is being assessed upon: Admission    I agree that I, Maureen Dean RN, along with Doug Rosales RN (either 2 RN's or 1 LPN and 1 RN) have performed a thorough Head to Toe Skin Assessment on the patient. ALL assessment sites listed below have been assessed. Areas assessed by both nurses:     [x]   Head, Face, and Ears   [x]   Shoulders, Back, and Chest  [x]   Arms, Elbows, and Hands   [x]   Coccyx, Sacrum, and Ischium  [x]   Legs, Feet, and Heels    Does the Patient have Skin Breakdown?  No    Harman Prevention initiated: No  Wound Care Orders initiated: NA    [facility] nurse consulted for Pressure Injury (Stage 3,4, Unstageable, DTI, NWPT, and Complex wounds) and New or Established Ostomies: NA        Primary Nurse eSignature: Maureen Dean RN on 6/5/2023 at 5:18 PM      Co-Signer eSignature: Electronically signed by Doug Rosales RN on 6/5/23 at 5:43 PM CDT

## 2023-06-06 VITALS
SYSTOLIC BLOOD PRESSURE: 126 MMHG | DIASTOLIC BLOOD PRESSURE: 60 MMHG | WEIGHT: 210 LBS | RESPIRATION RATE: 16 BRPM | OXYGEN SATURATION: 97 % | HEART RATE: 62 BPM | TEMPERATURE: 98 F | BODY MASS INDEX: 28.44 KG/M2 | HEIGHT: 72 IN

## 2023-06-06 LAB
ANION GAP SERPL CALCULATED.3IONS-SCNC: 10 MMOL/L (ref 7–19)
BUN SERPL-MCNC: 41 MG/DL (ref 8–23)
CALCIUM SERPL-MCNC: 8.5 MG/DL (ref 8.8–10.2)
CHLORIDE SERPL-SCNC: 108 MMOL/L (ref 98–111)
CO2 SERPL-SCNC: 18 MMOL/L (ref 22–29)
CREAT SERPL-MCNC: 1.9 MG/DL (ref 0.5–1.2)
ERYTHROCYTE [DISTWIDTH] IN BLOOD BY AUTOMATED COUNT: 12.9 % (ref 11.5–14.5)
GLUCOSE SERPL-MCNC: 133 MG/DL (ref 74–109)
HCT VFR BLD AUTO: 34.7 % (ref 42–52)
HGB BLD-MCNC: 11.9 G/DL (ref 14–18)
MCH RBC QN AUTO: 35 PG (ref 27–31)
MCHC RBC AUTO-ENTMCNC: 34.3 G/DL (ref 33–37)
MCV RBC AUTO: 102.1 FL (ref 80–94)
PLATELET # BLD AUTO: 161 K/UL (ref 130–400)
PMV BLD AUTO: 12 FL (ref 9.4–12.4)
POTASSIUM SERPL-SCNC: 4.1 MMOL/L (ref 3.5–5)
RBC # BLD AUTO: 3.4 M/UL (ref 4.7–6.1)
SODIUM SERPL-SCNC: 136 MMOL/L (ref 136–145)
WBC # BLD AUTO: 8.8 K/UL (ref 4.8–10.8)

## 2023-06-06 PROCEDURE — 93922 UPR/L XTREMITY ART 2 LEVELS: CPT

## 2023-06-06 PROCEDURE — 94760 N-INVAS EAR/PLS OXIMETRY 1: CPT

## 2023-06-06 PROCEDURE — 6360000002 HC RX W HCPCS: Performed by: SURGERY

## 2023-06-06 PROCEDURE — 2580000003 HC RX 258: Performed by: SURGERY

## 2023-06-06 PROCEDURE — 36415 COLL VENOUS BLD VENIPUNCTURE: CPT

## 2023-06-06 PROCEDURE — 85027 COMPLETE CBC AUTOMATED: CPT

## 2023-06-06 PROCEDURE — 80048 BASIC METABOLIC PNL TOTAL CA: CPT

## 2023-06-06 PROCEDURE — 6370000000 HC RX 637 (ALT 250 FOR IP): Performed by: SURGERY

## 2023-06-06 RX ORDER — DOCUSATE SODIUM 100 MG/1
100 CAPSULE, LIQUID FILLED ORAL DAILY
Status: DISCONTINUED | OUTPATIENT
Start: 2023-06-06 | End: 2023-06-06 | Stop reason: HOSPADM

## 2023-06-06 RX ADMIN — EZETIMIBE 10 MG: 10 TABLET ORAL at 08:25

## 2023-06-06 RX ADMIN — LISINOPRIL 20 MG: 20 TABLET ORAL at 08:25

## 2023-06-06 RX ADMIN — ENOXAPARIN SODIUM 40 MG: 100 INJECTION SUBCUTANEOUS at 08:26

## 2023-06-06 RX ADMIN — SODIUM CHLORIDE, PRESERVATIVE FREE 10 ML: 5 INJECTION INTRAVENOUS at 08:26

## 2023-06-06 RX ADMIN — LEVOTHYROXINE SODIUM 137 MCG: 137 TABLET ORAL at 05:05

## 2023-06-06 RX ADMIN — ATENOLOL 25 MG: 25 TABLET ORAL at 08:25

## 2023-06-06 RX ADMIN — SODIUM CHLORIDE: 9 INJECTION, SOLUTION INTRAVENOUS at 04:08

## 2023-06-06 RX ADMIN — AMLODIPINE BESYLATE 10 MG: 5 TABLET ORAL at 08:25

## 2023-06-06 RX ADMIN — FEBUXOSTAT 40 MG: 40 TABLET, FILM COATED ORAL at 08:26

## 2023-06-06 RX ADMIN — CLOPIDOGREL BISULFATE 75 MG: 75 TABLET ORAL at 08:26

## 2023-06-06 RX ADMIN — DOCUSATE SODIUM 100 MG: 100 CAPSULE, LIQUID FILLED ORAL at 08:25

## 2023-06-06 NOTE — PLAN OF CARE
Problem: Discharge Planning  Goal: Discharge to home or other facility with appropriate resources  6/6/2023 1529 by Beka Dahl RN  Outcome: Progressing  6/6/2023 0255 by Mike De La Rosa RN  Outcome: Progressing  Flowsheets (Taken 6/5/2023 1553 by Manolo Parker RN)  Discharge to home or other facility with appropriate resources:   Identify barriers to discharge with patient and caregiver   Identify discharge learning needs (meds, wound care, etc)   Refer to discharge planning if patient needs post-hospital services based on physician order or complex needs related to functional status, cognitive ability or social support system   Arrange for interpreters to assist at discharge as needed   Arrange for needed discharge resources and transportation as appropriate     Problem: ABCDS Injury Assessment  Goal: Absence of physical injury  6/6/2023 1529 by Bkea Dahl RN  Outcome: Progressing  6/6/2023 0255 by Mike De La Rosa RN  Outcome: Progressing  Flowsheets (Taken 6/5/2023 1551 by Manolo Parker RN)  Absence of Physical Injury: Implement safety measures based on patient assessment     Problem: Safety - Adult  Goal: Free from fall injury  6/6/2023 1529 by Beka Dahl RN  Outcome: Progressing  6/6/2023 0255 by Mike De La Rosa RN  Outcome: Progressing
Problem: Discharge Planning  Goal: Discharge to home or other facility with appropriate resources  6/6/2023 1530 by Kayden La RN  Outcome: Completed  6/6/2023 1529 by Kayden La RN  Outcome: Progressing  6/6/2023 0255 by Yvonne Hathaway RN  Outcome: Progressing  Flowsheets (Taken 6/5/2023 1553 by Gay Curran, RN)  Discharge to home or other facility with appropriate resources:   Identify barriers to discharge with patient and caregiver   Identify discharge learning needs (meds, wound care, etc)   Refer to discharge planning if patient needs post-hospital services based on physician order or complex needs related to functional status, cognitive ability or social support system   Arrange for interpreters to assist at discharge as needed   Arrange for needed discharge resources and transportation as appropriate     Problem: ABCDS Injury Assessment  Goal: Absence of physical injury  6/6/2023 1530 by Kayden La RN  Outcome: Completed  6/6/2023 1529 by Kayden La RN  Outcome: Progressing  6/6/2023 0255 by Yvonne Hathaway RN  Outcome: Progressing  Flowsheets (Taken 6/5/2023 1551 by Gay Curran RN)  Absence of Physical Injury: Implement safety measures based on patient assessment     Problem: Safety - Adult  Goal: Free from fall injury  6/6/2023 1530 by Kayden La RN  Outcome: Completed  6/6/2023 1529 by Kayden La RN  Outcome: Progressing  6/6/2023 0255 by Yvonne Hathaway RN  Outcome: Progressing
Problem: Discharge Planning  Goal: Discharge to home or other facility with appropriate resources  Outcome: Not Progressing     Problem: ABCDS Injury Assessment  Goal: Absence of physical injury  Outcome: Not Progressing     Problem: Safety - Adult  Goal: Free from fall injury  Outcome: Not Progressing     Problem: Discharge Planning  Goal: Discharge to home or other facility with appropriate resources  Outcome: Not Progressing     Problem: ABCDS Injury Assessment  Goal: Absence of physical injury  Outcome: Not Progressing     Problem: Safety - Adult  Goal: Free from fall injury  Outcome: Not Progressing
discharge as needed   Arrange for needed discharge resources and transportation as appropriate  6/5/2023 1342 by Fadumo Farias RN  Outcome: Not Progressing     Problem: ABCDS Injury Assessment  Goal: Absence of physical injury  6/6/2023 0255 by Kathy Casiano RN  Outcome: Progressing  Flowsheets (Taken 6/5/2023 1551 by Fadumo Farias RN)  Absence of Physical Injury: Implement safety measures based on patient assessment  6/5/2023 1342 by Fadumo Farias RN  Outcome: Not Progressing     Problem: Safety - Adult  Goal: Free from fall injury  6/6/2023 0255 by Kathy Casiano RN  Outcome: Progressing  6/5/2023 1342 by Fadumo Farias RN  Outcome: Not Progressing

## 2023-06-06 NOTE — PROGRESS NOTES
Patient has voided after stearns removed, walked in galvan with assist, and taking po without nausea. DC entered for home.

## 2023-06-06 NOTE — PROGRESS NOTES
Vascular Preliminary Report  B/L LE KARUNA studies performed. Right PTA:  0.79  Left PTA:  0.74           DPA:  0.75        DPA:  0.72           TOE:  0.41        TOE:  0.72    Final report pending.

## 2023-06-06 NOTE — PROGRESS NOTES
Vascular Surgery  Dr. Sanda Harada   Daily Progress Note    Pt Name: Praveen Fuller Record Number: [id number]  Date of Birth 1945   Today's Date: 6/6/2023  SUBJECTIVE:     Patient was seen and examined.   Pain is controlled, stating groins slightly tender  Has not had nausea/vomiting    OBJECTIVE:     Patient Vitals for the past 24 hrs:   BP Temp Temp src Pulse Resp SpO2   06/06/23 0744 -- -- -- -- -- 97 %   06/06/23 0507 126/60 98 °F (36.7 °C) Oral 62 16 --   06/06/23 0306 125/66 97.9 °F (36.6 °C) Oral 62 16 --   06/06/23 0118 (!) 108/52 97.7 °F (36.5 °C) Oral 60 16 96 %   06/05/23 2303 (!) 110/53 98 °F (36.7 °C) Oral 63 -- 97 %   06/05/23 2123 (!) 125/59 97.7 °F (36.5 °C) Oral 67 16 99 %   06/05/23 2000 -- -- -- 65 -- --   06/05/23 1842 (!) 101/59 97.2 °F (36.2 °C) Temporal 65 16 97 %   06/05/23 1632 (!) 114/57 97.9 °F (36.6 °C) Oral 51 16 100 %   06/05/23 1415 (!) 114/58 97.4 °F (36.3 °C) Oral 59 14 100 %   06/05/23 1245 114/64 98.7 °F (37.1 °C) Oral 57 14 98 %   06/05/23 1223 (!) 119/56 98.8 °F (37.1 °C) Temporal 56 12 98 %   06/05/23 1220 (!) 119/56 -- -- 56 13 100 %   06/05/23 1215 (!) 105/57 -- -- 55 19 96 %   06/05/23 1210 (!) 100/56 -- -- 55 19 96 %   06/05/23 1205 (!) 106/55 -- -- 54 18 96 %   06/05/23 1200 (!) 104/54 -- -- 58 21 95 %   06/05/23 1155 (!) 108/55 -- -- 58 17 96 %   06/05/23 1150 (!) 100/54 -- -- 56 18 94 %   06/05/23 1145 (!) 98/54 -- -- 60 20 94 %   06/05/23 1140 (!) 103/55 -- -- 58 20 94 %   06/05/23 1135 (!) 102/55 -- -- 60 23 92 %   06/05/23 1131 -- 99.1 °F (37.3 °C) Temporal -- -- --   06/05/23 1130 -- -- -- 72 20 91 %   06/05/23 1125 -- -- -- -- -- 93 %   06/05/23 1124 (!) 106/44 99.1 °F (37.3 °C) Temporal 60 17 92 %         Intake/Output Summary (Last 24 hours) at 6/6/2023 0911  Last data filed at 6/6/2023 0830  Gross per 24 hour   Intake 1561 ml   Output 1860 ml   Net -299 ml       In: 1561 [P.O.:340; I.V.:1221]  Out: 1860 [Urine:1810]    I/O last 3 completed

## 2023-06-07 ENCOUNTER — TELEPHONE (OUTPATIENT)
Dept: VASCULAR SURGERY | Age: 78
End: 2023-06-07

## 2023-06-07 NOTE — TELEPHONE ENCOUNTER
Contacted the patient to let him know of his 2 week follow up appointment and that he should stop down in the lab prior to his appointment for labs. The order has been placed and the patient is aware.             ----- Message from ANANT Zafar sent at 6/7/2023  9:22 AM CDT -----  He needs a bmp  ----- Message -----  From: Tanya Houston MA  Sent: 6/6/2023  11:44 AM CDT  To: ANANT Zafar    We made this patient a follow up with Kayden Soni today. She was asking if you wanted updated labs prior to his appointment. I wasn't sure.     Thanks,  [facility]

## 2023-06-19 DIAGNOSIS — N28.9 RENAL INSUFFICIENCY: Primary | ICD-10-CM

## 2023-06-20 ENCOUNTER — OFFICE VISIT (OUTPATIENT)
Dept: VASCULAR SURGERY | Age: 78
End: 2023-06-20

## 2023-06-20 VITALS
SYSTOLIC BLOOD PRESSURE: 144 MMHG | TEMPERATURE: 97.2 F | DIASTOLIC BLOOD PRESSURE: 68 MMHG | HEART RATE: 56 BPM | OXYGEN SATURATION: 94 %

## 2023-06-20 DIAGNOSIS — N28.9 RENAL INSUFFICIENCY: ICD-10-CM

## 2023-06-20 DIAGNOSIS — I71.43 INFRARENAL ABDOMINAL AORTIC ANEURYSM (AAA) WITHOUT RUPTURE (HCC): Primary | ICD-10-CM

## 2023-06-20 LAB
ANION GAP SERPL CALCULATED.3IONS-SCNC: 12 MMOL/L (ref 7–19)
APTT PPP: 29.6 SEC (ref 26–36.2)
BUN SERPL-MCNC: 35 MG/DL (ref 8–23)
CALCIUM SERPL-MCNC: 9.6 MG/DL (ref 8.8–10.2)
CHLORIDE SERPL-SCNC: 100 MMOL/L (ref 98–111)
CO2 SERPL-SCNC: 25 MMOL/L (ref 22–29)
CREAT SERPL-MCNC: 2.1 MG/DL (ref 0.5–1.2)
GLUCOSE SERPL-MCNC: 147 MG/DL (ref 74–109)
INR PPP: 1.05 (ref 0.88–1.18)
POTASSIUM SERPL-SCNC: 3.7 MMOL/L (ref 3.5–5)
PROTHROMBIN TIME: 13.4 SEC (ref 12–14.6)
SODIUM SERPL-SCNC: 137 MMOL/L (ref 136–145)

## 2023-06-20 PROCEDURE — 99024 POSTOP FOLLOW-UP VISIT: CPT | Performed by: NURSE PRACTITIONER

## 2023-06-20 NOTE — PROGRESS NOTES
Carl Miller is a 69 y.o. male who presents for post op evaluation. Patient had a  EVAR  2 weeks ago. This was performed by Dr. Anselmo Goldstein. Post op symptoms reported none. Post op pain is minimal.      On evaluation today, incision is clean, dry, intact. No signs of infection are present. femoral pulses are present 2+ . GFR 32    Today we have recommended he Wash incision daily with soap and water and cover with dry gauze until healed. He is to follow up with pcp in 2 weeks for repeat bmp. We will follow up with him in 3 months. This should bring you up to date on Gustavo Qureshi  As always we want to thank you for allowing us to participate in the care of your patients.     Sincerely,    Simmie Curling, APRN

## 2023-07-10 ENCOUNTER — TELEPHONE (OUTPATIENT)
Dept: VASCULAR SURGERY | Age: 78
End: 2023-07-10

## 2023-07-10 NOTE — TELEPHONE ENCOUNTER
[facility] to request labs patient had on 7/6/23. I spoke with Yuan Perez and she is going to fax them over today.

## 2023-07-11 ENCOUNTER — TELEPHONE (OUTPATIENT)
Dept: VASCULAR SURGERY | Age: 78
End: 2023-07-11

## 2023-07-11 ENCOUNTER — TELEPHONE (OUTPATIENT)
Dept: UROLOGY | Age: 78
End: 2023-07-11

## 2023-07-11 NOTE — TELEPHONE ENCOUNTER
Patient was informed of this information and will call us if he needs us further. Patient is to keep f/u appt as scheduled.

## 2023-07-11 NOTE — TELEPHONE ENCOUNTER
Patient called stating he was passing blood in urine last night and had some discomfort during urination. He has no there symptoms. Patient had not been passing blood before this and is no longer passing blood, but was concerned and was advised by his doctor to contact office. Please advise if patient needs to f/u with APRN or is there something he should be doing to help the situation?

## 2023-07-11 NOTE — TELEPHONE ENCOUNTER
Called patient to let him know that [facility] received his labs and there was only minimal change. It is very important that he follows up with his pcp or kidney doctor to watch his kidney function moving forward. Patient did not answer and his vm has not been set up.

## 2023-07-11 NOTE — TELEPHONE ENCOUNTER
Patient wanted to advise he started passing blood in urine 07/10/23 and will be reaching out to urology for appointment  Please advise  Thank you

## 2023-07-24 DIAGNOSIS — I70.213 ATHEROSCLEROSIS OF NATIVE ARTERY OF BOTH LOWER EXTREMITIES WITH INTERMITTENT CLAUDICATION (HCC): ICD-10-CM

## 2023-07-24 DIAGNOSIS — M79.606 PAIN OF LOWER EXTREMITY, UNSPECIFIED LATERALITY: ICD-10-CM

## 2023-07-25 ENCOUNTER — TELEPHONE (OUTPATIENT)
Dept: VASCULAR SURGERY | Age: 78
End: 2023-07-25

## 2023-07-25 ENCOUNTER — OFFICE VISIT (OUTPATIENT)
Dept: VASCULAR SURGERY | Age: 78
End: 2023-07-25
Payer: MEDICARE

## 2023-07-25 VITALS
TEMPERATURE: 97.2 F | HEART RATE: 57 BPM | OXYGEN SATURATION: 100 % | BODY MASS INDEX: 28.44 KG/M2 | DIASTOLIC BLOOD PRESSURE: 70 MMHG | WEIGHT: 210 LBS | SYSTOLIC BLOOD PRESSURE: 140 MMHG | HEIGHT: 72 IN

## 2023-07-25 DIAGNOSIS — I70.213 ATHEROSCLER OF NATIVE ARTERY OF BOTH LEGS WITH INTERMIT CLAUDICATION (HCC): Primary | ICD-10-CM

## 2023-07-25 DIAGNOSIS — M79.605 LEG PAIN, LEFT: ICD-10-CM

## 2023-07-25 PROCEDURE — 3074F SYST BP LT 130 MM HG: CPT | Performed by: NURSE PRACTITIONER

## 2023-07-25 PROCEDURE — 1123F ACP DISCUSS/DSCN MKR DOCD: CPT | Performed by: NURSE PRACTITIONER

## 2023-07-25 PROCEDURE — 3078F DIAST BP <80 MM HG: CPT | Performed by: NURSE PRACTITIONER

## 2023-07-25 PROCEDURE — G8417 CALC BMI ABV UP PARAM F/U: HCPCS | Performed by: NURSE PRACTITIONER

## 2023-07-25 PROCEDURE — G8427 DOCREV CUR MEDS BY ELIG CLIN: HCPCS | Performed by: NURSE PRACTITIONER

## 2023-07-25 PROCEDURE — 99214 OFFICE O/P EST MOD 30 MIN: CPT | Performed by: NURSE PRACTITIONER

## 2023-07-25 PROCEDURE — 4004F PT TOBACCO SCREEN RCVD TLK: CPT | Performed by: NURSE PRACTITIONER

## 2023-07-25 NOTE — TELEPHONE ENCOUNTER
Spoke with the patient to ask him to come into the office to be seen today. I let the patient know we would get him on the schedule once he gets here. The patient acknowledged. ----- Message from ANANT Marie sent at 7/25/2023  6:11 AM CDT -----  Please call patient and get him here this morning.   This does not need to wait until tomorrow

## 2023-07-25 NOTE — PROGRESS NOTES
Amy Alvarado (:  1945) is a 69 y.o. male,Established patient, here for evaluation of the following chief complaint(s):  Follow-up (Follow up/)            SUBJECTIVE/OBJECTIVE:  Lashonda Martinez has a history of peripheral vascular disease of the lower extremities. He has had this for 1 - 5 years. He has had a recent EVAR. He reports a 5 day hx of severe left leg pain. He reports it involves the buttock, thigh, and calf. We brought him in urgent today to make sure there was no graft thrombosis or signs atheroemboli. He has known left popliteal artery occlusion. He reports he can only walk about 20 yards and pain is rated at an 8 out of 10. He reports no significant back pain. He reports he has to \"toss and turn\" in bed. He has been using lidocaine patches and these seem to help. He has to take weight off foot to relieve the pain. Current treatment includes clopidogrel 75 mg po qd, ASA EC daily. He has no wounds.       I have personally reviewed the following: problem list, current meds, allergies, PMH, PSH, family hx, and social hx  Amy Alvarado is a 69 y.o. male with the following history as recorded in [facility]:  Patient Active Problem List    Diagnosis Date Noted    Memory loss 2017    Abnormal brain scan 2017    Carotid disease, bilateral ([address]) 2017    Paroxysmal atrial fibrillation ([address]) 04/15/2017    Coronary artery disease involving native coronary artery of native heart without angina pectoris     Hyperlipidemia     Hyperthyroidism     Right BBB/left ant fasc block     Essential hypertension     Sleep apnea     AAA (abdominal aortic aneurysm) without rupture ([address])     Atherosclerosis of native artery of both lower extremities with intermittent claudication ([address])     Bilateral carotid artery stenosis     Malignant neoplasm of prostate ([address]) 10/05/2016    Presence of stent in coronary artery 2016     Current Outpatient Medications   Medication Sig Dispense Refill

## 2023-07-26 ENCOUNTER — HOSPITAL ENCOUNTER (OUTPATIENT)
Dept: VASCULAR LAB | Age: 78
Discharge: HOME | End: 2023-07-26
Payer: MEDICARE

## 2023-07-26 DIAGNOSIS — I70.213 ATHEROSCLEROSIS OF NATIVE ARTERY OF BOTH LOWER EXTREMITIES WITH INTERMITTENT CLAUDICATION (HCC): ICD-10-CM

## 2023-07-26 PROCEDURE — 93923 UPR/LXTR ART STDY 3+ LVLS: CPT

## 2023-07-31 ENCOUNTER — HOSPITAL ENCOUNTER (OUTPATIENT)
Dept: ULTRASOUND IMAGING | Age: 78
Discharge: HOME | End: 2023-07-31
Attending: INTERNAL MEDICINE
Payer: MEDICARE

## 2023-07-31 DIAGNOSIS — N18.32 STAGE 3B CHRONIC KIDNEY DISEASE (HCC): ICD-10-CM

## 2023-07-31 PROCEDURE — 76770 US EXAM ABDO BACK WALL COMP: CPT

## 2023-08-04 ENCOUNTER — TELEPHONE (OUTPATIENT)
Dept: VASCULAR SURGERY | Age: 78
End: 2023-08-04

## 2023-08-08 ENCOUNTER — TELEPHONE (OUTPATIENT)
Dept: VASCULAR SURGERY | Age: 78
End: 2023-08-08

## 2023-08-08 ENCOUNTER — OFFICE VISIT (OUTPATIENT)
Dept: UROLOGY | Age: 78
End: 2023-08-08

## 2023-08-08 VITALS — TEMPERATURE: 97.6 F | BODY MASS INDEX: 27.58 KG/M2 | HEIGHT: 72 IN | WEIGHT: 203.6 LBS

## 2023-08-08 DIAGNOSIS — C61 BIOCHEMICALLY RECURRENT MALIGNANT NEOPLASM OF PROSTATE (HCC): ICD-10-CM

## 2023-08-08 DIAGNOSIS — R31.0 GROSS HEMATURIA: Primary | ICD-10-CM

## 2023-08-08 DIAGNOSIS — R97.21 BIOCHEMICALLY RECURRENT MALIGNANT NEOPLASM OF PROSTATE (HCC): ICD-10-CM

## 2023-08-08 LAB — POST VOID RESIDUAL (PVR): 0 ML

## 2023-08-08 NOTE — PROGRESS NOTES
Feliberto Estrada is a 69 y.o., male, Established patient who presents today   Chief Complaint   Patient presents with    Follow-up     I am here today for painful urination and hematuria        HPI   Patient presents for evaluation of gross hematuria. Patient reports on 7/10/2023 just prior to bed, he noted some bright red blood in his urine. He reports with subsequent urinations, the blood seemed to lessen until it finally resolved. Patient denies any associated symptoms such as fevers, chills, nausea, vomiting. Loyd Lopez He reports a slight increase in nocturia, but he attributes this to his increased fluid intake. He also reports feeling a slight discomfort in his penis when he needs to urinate. He states that he recently underwent vascular procedure with Dr. Mallory Longoria, but no changes were made to his anticoagulation. He is a smoker, but is currently trying to quit. He has a 50-60-pack-year history. Patient also reports Dr. Marleny Cisneros, his nephrologist, recommended he follow-up with our office for findings on a recent renal ultrasound. There is a hyperechoic region located centrally in the right kidney. Dr. Marleny Cisneros has recommended the patient receive no further contrast if possible secondary to a recent rise in his creatinine, however, patient does have some prior CTAs of the abdomen and pelvis I can review. In comparing these images, the area appears to be more consistent with lobulation of the renal cortex rather than a solid mass or lesion. Patient also mentions a pain radiating from his left hip/buttock down to his ankle. He reports primary care provider is aware of the pain and is attempting to order an MRI currently. Patient is typically seen in our office for prostate cancer which was first diagnosed 11 years ago. His prostate cancer can be characterized as early-stage organ confined. Pathologic stage T2b Mariano 3+4 = 7 grade group 2.   His PSA was undetectable until 2016 he now has a very low slightly

## 2023-08-08 NOTE — TELEPHONE ENCOUNTER
----- Message from One Rubin Qureshi sent at 8/6/2023 12:22 PM CDT -----  Regarding: prior stents  Contact: [phone]  Dear Сергей Fuentes: I have gone through my records and cannot find any card relating to a stint I believe Dr. Willard Cuevas inserted into my right carotid. I have records for the stent in my left iliac artery and the work Dr. Mehran Peng did for my AAA. I remember the procedure on the right carotid ( was not under gen'l anesthesia) and felt a wierd sensation as it was  expanded, I think. Anyhow I lack any info on that stent. Also, on my left carotid I don't think a stent was placed. Sorry to be a pain, but could you check my notes and let the MRI folks at [address] [place], [place] [address] Ave know. The phone number I have is [phone] [phone].  Thanks, Sara Santo

## 2023-08-09 LAB
BACTERIA URINE, POC: 0
BILIRUBIN URINE: 0 MG/DL
BLOOD, URINE: POSITIVE
CASTS URINE, POC: 0
CLARITY: CLEAR
COLOR: YELLOW
CRYSTALS URINE, POC: 0
EPI CELLS URINE, POC: 0
GLUCOSE URINE: ABNORMAL
KETONES, URINE: NEGATIVE
LEUKOCYTE EST, POC: ABNORMAL
NITRITE, URINE: NEGATIVE
PH UA: 5.5 (ref 4.5–8)
PROTEIN UA: NEGATIVE
RBC URINE, POC: 1
SPECIFIC GRAVITY UA: 1 (ref 1–1.03)
UROBILINOGEN, URINE: NORMAL
WBC URINE, POC: 0
YEAST URINE, POC: 0

## 2023-08-10 PROBLEM — R31.0 GROSS HEMATURIA: Status: ACTIVE | Noted: 2023-08-10

## 2023-08-11 ASSESSMENT — ENCOUNTER SYMPTOMS
ABDOMINAL PAIN: 0
NAUSEA: 0
BACK PAIN: 1
VOMITING: 0
ABDOMINAL DISTENTION: 0

## 2023-08-22 ENCOUNTER — TELEPHONE (OUTPATIENT)
Dept: UROLOGY | Age: 78
End: 2023-08-22

## 2023-08-22 ENCOUNTER — TELEPHONE (OUTPATIENT)
Dept: VASCULAR SURGERY | Age: 78
End: 2023-08-22

## 2023-08-22 NOTE — TELEPHONE ENCOUNTER
Patient contacted office and would like to speak with provider or nurse about upcoming surgery on 8/30. He wants to know when he should stop taking Plavix and Aspirin? Also wanted to know if there was anything else he would need to know prior to surgery?

## 2023-08-22 NOTE — TELEPHONE ENCOUNTER
We received a medication hold today from [facility]. They are requesting to hold the patient's Plavix for 7 days prior to a surgery. We have called in Lovenox 40 MG and the schedule of taking that is below. We have faxed the clearance to Urology and it is in [address] [place] [address] 15. The patient has an appointment to bring in his Lovenox in on Thursday morning @ 8:15 AM for his first dose of Lovenox. The patient is aware and the prescription has been called into [facility].      August 22, 2023       Lovenox directions    Procedure date and time:  08/30/2023    Begin holding antiplatelet med: Plavix -:  Wednesday, 08/23/2023 7:00 AM      Start Lovenox: Thursday, 08/24/2023 @ 7:00 AM (24 hours after first missed dose)    Continue Lovenox: Friday, 08/25/2023 7:00 AM   Saturday, 08/26/2023  7:00 AM  Sunday, 08/27/2023 7:00 AM  Monday, 08/28/2023 7:00 AM     Last does Lovenox  24 hours before procedure:  Tuesday, 08/29/2023 @ 7:00 AM    Restart Coumadin:   night of procedure unless otherwise specified by the physician    Restart Lovenox:   night of procedure unless otherwise specified by the physician    Continue Lovenox Thursday, 08/31/2023 @ 7:00 AM    Protime - Friday, September 1    Call the office or go to the emergency room with any signs or symptoms of bleeding

## 2023-08-22 NOTE — TELEPHONE ENCOUNTER
I reached out to Dr. Vivian Nunez office today regarding the clearance request I sent on 8/8/23. Waiting for a response.

## 2023-08-24 ENCOUNTER — NURSE ONLY (OUTPATIENT)
Dept: VASCULAR SURGERY | Age: 78
End: 2023-08-24

## 2023-08-24 ENCOUNTER — TELEPHONE (OUTPATIENT)
Dept: VASCULAR SURGERY | Age: 78
End: 2023-08-24

## 2023-08-24 NOTE — TELEPHONE ENCOUNTER
Called and placed a medication order for 2 more Lovenox 40 MG shots for the patient. When he picked them up this week he only got 5 and needed 7. The pharmacist said she would get them filled. The patient is aware.

## 2023-08-30 ENCOUNTER — HOSPITAL ENCOUNTER (OUTPATIENT)
Age: 78
Setting detail: OUTPATIENT SURGERY
Discharge: HOME | End: 2023-08-30
Attending: UROLOGY | Admitting: UROLOGY
Payer: MEDICARE

## 2023-08-30 ENCOUNTER — ANESTHESIA (OUTPATIENT)
Dept: OPERATING ROOM | Age: 78
End: 2023-08-30
Payer: MEDICARE

## 2023-08-30 ENCOUNTER — ANESTHESIA EVENT (OUTPATIENT)
Dept: OPERATING ROOM | Age: 78
End: 2023-08-30
Payer: MEDICARE

## 2023-08-30 ENCOUNTER — APPOINTMENT (OUTPATIENT)
Dept: GENERAL RADIOLOGY | Age: 78
End: 2023-08-30
Attending: UROLOGY
Payer: MEDICARE

## 2023-08-30 VITALS
WEIGHT: 200 LBS | TEMPERATURE: 97.1 F | DIASTOLIC BLOOD PRESSURE: 64 MMHG | HEART RATE: 61 BPM | RESPIRATION RATE: 16 BRPM | OXYGEN SATURATION: 100 % | SYSTOLIC BLOOD PRESSURE: 128 MMHG | HEIGHT: 69 IN | BODY MASS INDEX: 29.62 KG/M2

## 2023-08-30 PROCEDURE — 7100000001 HC PACU RECOVERY - ADDTL 15 MIN: Performed by: UROLOGY

## 2023-08-30 PROCEDURE — 7100000010 HC PHASE II RECOVERY - FIRST 15 MIN: Performed by: UROLOGY

## 2023-08-30 PROCEDURE — 74420 UROGRAPHY RTRGR +-KUB: CPT

## 2023-08-30 PROCEDURE — C1769 GUIDE WIRE: HCPCS | Performed by: UROLOGY

## 2023-08-30 PROCEDURE — 3700000000 HC ANESTHESIA ATTENDED CARE: Performed by: UROLOGY

## 2023-08-30 PROCEDURE — C9399 UNCLASSIFIED DRUGS OR BIOLOG: HCPCS | Performed by: NURSE ANESTHETIST, CERTIFIED REGISTERED

## 2023-08-30 PROCEDURE — 6370000000 HC RX 637 (ALT 250 FOR IP): Performed by: UROLOGY

## 2023-08-30 PROCEDURE — 3600000004 HC SURGERY LEVEL 4 BASE: Performed by: UROLOGY

## 2023-08-30 PROCEDURE — 2500000003 HC RX 250 WO HCPCS: Performed by: NURSE ANESTHETIST, CERTIFIED REGISTERED

## 2023-08-30 PROCEDURE — 2580000003 HC RX 258: Performed by: NURSE ANESTHETIST, CERTIFIED REGISTERED

## 2023-08-30 PROCEDURE — 52005 CYSTO W/URTRL CATHJ: CPT | Performed by: UROLOGY

## 2023-08-30 PROCEDURE — 6360000002 HC RX W HCPCS: Performed by: NURSE ANESTHETIST, CERTIFIED REGISTERED

## 2023-08-30 PROCEDURE — 7100000011 HC PHASE II RECOVERY - ADDTL 15 MIN: Performed by: UROLOGY

## 2023-08-30 PROCEDURE — 3600000014 HC SURGERY LEVEL 4 ADDTL 15MIN: Performed by: UROLOGY

## 2023-08-30 PROCEDURE — C1758 CATHETER, URETERAL: HCPCS | Performed by: UROLOGY

## 2023-08-30 PROCEDURE — 7100000000 HC PACU RECOVERY - FIRST 15 MIN: Performed by: UROLOGY

## 2023-08-30 PROCEDURE — 6360000002 HC RX W HCPCS: Performed by: NURSE PRACTITIONER

## 2023-08-30 PROCEDURE — 3700000001 HC ADD 15 MINUTES (ANESTHESIA): Performed by: UROLOGY

## 2023-08-30 PROCEDURE — 2709999900 HC NON-CHARGEABLE SUPPLY: Performed by: UROLOGY

## 2023-08-30 PROCEDURE — 74420 UROGRAPHY RTRGR +-KUB: CPT | Performed by: UROLOGY

## 2023-08-30 PROCEDURE — A4216 STERILE WATER/SALINE, 10 ML: HCPCS | Performed by: NURSE PRACTITIONER

## 2023-08-30 PROCEDURE — 2580000003 HC RX 258: Performed by: NURSE PRACTITIONER

## 2023-08-30 RX ORDER — EPHEDRINE SULFATE 50 MG/ML
INJECTION, SOLUTION INTRAVENOUS PRN
Status: DISCONTINUED | OUTPATIENT
Start: 2023-08-30 | End: 2023-08-30 | Stop reason: SDUPTHER

## 2023-08-30 RX ORDER — MORPHINE SULFATE 2 MG/ML
2 INJECTION, SOLUTION INTRAMUSCULAR; INTRAVENOUS EVERY 5 MIN PRN
Status: CANCELLED | OUTPATIENT
Start: 2023-08-30

## 2023-08-30 RX ORDER — ROCURONIUM BROMIDE 10 MG/ML
INJECTION, SOLUTION INTRAVENOUS PRN
Status: DISCONTINUED | OUTPATIENT
Start: 2023-08-30 | End: 2023-08-30 | Stop reason: SDUPTHER

## 2023-08-30 RX ORDER — SODIUM CHLORIDE, SODIUM LACTATE, POTASSIUM CHLORIDE, CALCIUM CHLORIDE 600; 310; 30; 20 MG/100ML; MG/100ML; MG/100ML; MG/100ML
INJECTION, SOLUTION INTRAVENOUS CONTINUOUS PRN
Status: DISCONTINUED | OUTPATIENT
Start: 2023-08-30 | End: 2023-08-30 | Stop reason: SDUPTHER

## 2023-08-30 RX ORDER — FENTANYL CITRATE 50 UG/ML
25 INJECTION, SOLUTION INTRAMUSCULAR; INTRAVENOUS EVERY 5 MIN PRN
Status: DISCONTINUED | OUTPATIENT
Start: 2023-08-30 | End: 2023-08-30 | Stop reason: HOSPADM

## 2023-08-30 RX ORDER — LABETALOL HYDROCHLORIDE 5 MG/ML
10 INJECTION, SOLUTION INTRAVENOUS
Status: CANCELLED | OUTPATIENT
Start: 2023-08-30

## 2023-08-30 RX ORDER — METOCLOPRAMIDE HYDROCHLORIDE 5 MG/ML
10 INJECTION INTRAMUSCULAR; INTRAVENOUS
Status: CANCELLED | OUTPATIENT
Start: 2023-08-30 | End: 2023-08-31

## 2023-08-30 RX ORDER — SODIUM CHLORIDE 0.9 % (FLUSH) 0.9 %
5-40 SYRINGE (ML) INJECTION EVERY 12 HOURS SCHEDULED
Status: CANCELLED | OUTPATIENT
Start: 2023-08-30

## 2023-08-30 RX ORDER — SODIUM CHLORIDE 0.9 % (FLUSH) 0.9 %
5-40 SYRINGE (ML) INJECTION PRN
Status: DISCONTINUED | OUTPATIENT
Start: 2023-08-30 | End: 2023-08-30 | Stop reason: HOSPADM

## 2023-08-30 RX ORDER — LIDOCAINE HYDROCHLORIDE 10 MG/ML
INJECTION, SOLUTION EPIDURAL; INFILTRATION; INTRACAUDAL; PERINEURAL PRN
Status: DISCONTINUED | OUTPATIENT
Start: 2023-08-30 | End: 2023-08-30 | Stop reason: SDUPTHER

## 2023-08-30 RX ORDER — MEPERIDINE HYDROCHLORIDE 25 MG/ML
12.5 INJECTION INTRAMUSCULAR; INTRAVENOUS; SUBCUTANEOUS EVERY 5 MIN PRN
Status: CANCELLED | OUTPATIENT
Start: 2023-08-30

## 2023-08-30 RX ORDER — PHENAZOPYRIDINE HYDROCHLORIDE 100 MG/1
100 TABLET, FILM COATED ORAL 3 TIMES DAILY PRN
Qty: 6 TABLET | Refills: 0 | Status: SHIPPED | OUTPATIENT
Start: 2023-08-30 | End: 2023-09-01

## 2023-08-30 RX ORDER — SODIUM CHLORIDE 9 MG/ML
INJECTION, SOLUTION INTRAVENOUS CONTINUOUS
Status: DISCONTINUED | OUTPATIENT
Start: 2023-08-30 | End: 2023-08-30 | Stop reason: HOSPADM

## 2023-08-30 RX ORDER — ONDANSETRON 2 MG/ML
4 INJECTION INTRAMUSCULAR; INTRAVENOUS
Status: DISCONTINUED | OUTPATIENT
Start: 2023-08-30 | End: 2023-08-30 | Stop reason: HOSPADM

## 2023-08-30 RX ORDER — DROPERIDOL 2.5 MG/ML
0.62 INJECTION, SOLUTION INTRAMUSCULAR; INTRAVENOUS
Status: CANCELLED | OUTPATIENT
Start: 2023-08-30 | End: 2023-08-31

## 2023-08-30 RX ORDER — SODIUM CHLORIDE 0.9 % (FLUSH) 0.9 %
5-40 SYRINGE (ML) INJECTION PRN
Status: CANCELLED | OUTPATIENT
Start: 2023-08-30

## 2023-08-30 RX ORDER — ONDANSETRON 2 MG/ML
INJECTION INTRAMUSCULAR; INTRAVENOUS PRN
Status: DISCONTINUED | OUTPATIENT
Start: 2023-08-30 | End: 2023-08-30 | Stop reason: SDUPTHER

## 2023-08-30 RX ORDER — FENTANYL CITRATE 50 UG/ML
INJECTION, SOLUTION INTRAMUSCULAR; INTRAVENOUS PRN
Status: DISCONTINUED | OUTPATIENT
Start: 2023-08-30 | End: 2023-08-30 | Stop reason: SDUPTHER

## 2023-08-30 RX ORDER — DIPHENHYDRAMINE HYDROCHLORIDE 50 MG/ML
12.5 INJECTION INTRAMUSCULAR; INTRAVENOUS
Status: CANCELLED | OUTPATIENT
Start: 2023-08-30 | End: 2023-08-31

## 2023-08-30 RX ORDER — LIDOCAINE HYDROCHLORIDE 20 MG/ML
JELLY TOPICAL PRN
Status: DISCONTINUED | OUTPATIENT
Start: 2023-08-30 | End: 2023-08-30 | Stop reason: HOSPADM

## 2023-08-30 RX ORDER — SODIUM CHLORIDE 0.9 % (FLUSH) 0.9 %
5-40 SYRINGE (ML) INJECTION EVERY 12 HOURS SCHEDULED
Status: DISCONTINUED | OUTPATIENT
Start: 2023-08-30 | End: 2023-08-30 | Stop reason: HOSPADM

## 2023-08-30 RX ORDER — HYDROMORPHONE HYDROCHLORIDE 1 MG/ML
0.5 INJECTION, SOLUTION INTRAMUSCULAR; INTRAVENOUS; SUBCUTANEOUS EVERY 5 MIN PRN
Status: DISCONTINUED | OUTPATIENT
Start: 2023-08-30 | End: 2023-08-30 | Stop reason: HOSPADM

## 2023-08-30 RX ORDER — SODIUM CHLORIDE, SODIUM LACTATE, POTASSIUM CHLORIDE, CALCIUM CHLORIDE 600; 310; 30; 20 MG/100ML; MG/100ML; MG/100ML; MG/100ML
INJECTION, SOLUTION INTRAVENOUS CONTINUOUS
Status: DISCONTINUED | OUTPATIENT
Start: 2023-08-30 | End: 2023-08-30 | Stop reason: HOSPADM

## 2023-08-30 RX ORDER — PHENAZOPYRIDINE HYDROCHLORIDE 100 MG/1
100 TABLET, FILM COATED ORAL ONCE
Status: COMPLETED | OUTPATIENT
Start: 2023-08-30 | End: 2023-08-30

## 2023-08-30 RX ORDER — ENOXAPARIN SODIUM 300 MG/3ML
INJECTION INTRAVENOUS; SUBCUTANEOUS DAILY
COMMUNITY

## 2023-08-30 RX ORDER — PROPOFOL 10 MG/ML
INJECTION, EMULSION INTRAVENOUS PRN
Status: DISCONTINUED | OUTPATIENT
Start: 2023-08-30 | End: 2023-08-30 | Stop reason: SDUPTHER

## 2023-08-30 RX ORDER — SODIUM CHLORIDE 9 MG/ML
INJECTION, SOLUTION INTRAVENOUS PRN
Status: DISCONTINUED | OUTPATIENT
Start: 2023-08-30 | End: 2023-08-30 | Stop reason: HOSPADM

## 2023-08-30 RX ORDER — SODIUM CHLORIDE 9 MG/ML
INJECTION, SOLUTION INTRAVENOUS PRN
Status: CANCELLED | OUTPATIENT
Start: 2023-08-30

## 2023-08-30 RX ORDER — IPRATROPIUM BROMIDE AND ALBUTEROL SULFATE 2.5; .5 MG/3ML; MG/3ML
1 SOLUTION RESPIRATORY (INHALATION)
Status: CANCELLED | OUTPATIENT
Start: 2023-08-30 | End: 2023-08-31

## 2023-08-30 RX ORDER — FAMOTIDINE 20 MG/1
20 TABLET, FILM COATED ORAL ONCE
Status: CANCELLED | OUTPATIENT
Start: 2023-08-30 | End: 2023-08-30

## 2023-08-30 RX ORDER — SCOLOPAMINE TRANSDERMAL SYSTEM 1 MG/1
1 PATCH, EXTENDED RELEASE TRANSDERMAL
Status: CANCELLED | OUTPATIENT
Start: 2023-08-30 | End: 2023-09-02

## 2023-08-30 RX ORDER — MORPHINE SULFATE 4 MG/ML
4 INJECTION, SOLUTION INTRAMUSCULAR; INTRAVENOUS EVERY 5 MIN PRN
Status: CANCELLED | OUTPATIENT
Start: 2023-08-30

## 2023-08-30 RX ORDER — HYDROMORPHONE HYDROCHLORIDE 1 MG/ML
0.5 INJECTION, SOLUTION INTRAMUSCULAR; INTRAVENOUS; SUBCUTANEOUS
Status: DISCONTINUED | OUTPATIENT
Start: 2023-08-30 | End: 2023-08-30 | Stop reason: HOSPADM

## 2023-08-30 RX ORDER — DEXAMETHASONE SODIUM PHOSPHATE 10 MG/ML
INJECTION, SOLUTION INTRAMUSCULAR; INTRAVENOUS PRN
Status: DISCONTINUED | OUTPATIENT
Start: 2023-08-30 | End: 2023-08-30 | Stop reason: SDUPTHER

## 2023-08-30 RX ORDER — ONDANSETRON 2 MG/ML
4 INJECTION INTRAMUSCULAR; INTRAVENOUS EVERY 4 HOURS PRN
Status: DISCONTINUED | OUTPATIENT
Start: 2023-08-30 | End: 2023-08-30 | Stop reason: HOSPADM

## 2023-08-30 RX ORDER — PROCHLORPERAZINE EDISYLATE 5 MG/ML
5 INJECTION INTRAMUSCULAR; INTRAVENOUS
Status: DISCONTINUED | OUTPATIENT
Start: 2023-08-30 | End: 2023-08-30 | Stop reason: HOSPADM

## 2023-08-30 RX ORDER — LORAZEPAM 2 MG/ML
0.5 INJECTION INTRAMUSCULAR
Status: CANCELLED | OUTPATIENT
Start: 2023-08-30 | End: 2023-08-31

## 2023-08-30 RX ORDER — MIDAZOLAM HYDROCHLORIDE 2 MG/2ML
2 INJECTION, SOLUTION INTRAMUSCULAR; INTRAVENOUS
Status: CANCELLED | OUTPATIENT
Start: 2023-08-30 | End: 2023-08-31

## 2023-08-30 RX ORDER — LIDOCAINE HYDROCHLORIDE 10 MG/ML
1 INJECTION, SOLUTION EPIDURAL; INFILTRATION; INTRACAUDAL; PERINEURAL
Status: CANCELLED | OUTPATIENT
Start: 2023-08-30 | End: 2023-08-31

## 2023-08-30 RX ADMIN — ROCURONIUM BROMIDE 50 MG: 10 INJECTION, SOLUTION INTRAVENOUS at 09:56

## 2023-08-30 RX ADMIN — SUGAMMADEX 400 MG: 100 INJECTION, SOLUTION INTRAVENOUS at 10:44

## 2023-08-30 RX ADMIN — FENTANYL CITRATE 100 MCG: 0.05 INJECTION, SOLUTION INTRAMUSCULAR; INTRAVENOUS at 09:56

## 2023-08-30 RX ADMIN — PHENAZOPYRIDINE 100 MG: 100 TABLET ORAL at 11:15

## 2023-08-30 RX ADMIN — ONDANSETRON 4 MG: 2 INJECTION INTRAMUSCULAR; INTRAVENOUS at 10:44

## 2023-08-30 RX ADMIN — DEXAMETHASONE SODIUM PHOSPHATE 10 MG: 10 INJECTION, SOLUTION INTRAMUSCULAR; INTRAVENOUS at 10:12

## 2023-08-30 RX ADMIN — EPHEDRINE SULFATE 10 MG: 50 INJECTION INTRAMUSCULAR; INTRAVENOUS; SUBCUTANEOUS at 10:19

## 2023-08-30 RX ADMIN — PROPOFOL 150 MG: 10 INJECTION, EMULSION INTRAVENOUS at 09:56

## 2023-08-30 RX ADMIN — LIDOCAINE HYDROCHLORIDE 50 MG: 10 INJECTION, SOLUTION EPIDURAL; INFILTRATION; INTRACAUDAL; PERINEURAL at 09:56

## 2023-08-30 RX ADMIN — SODIUM CHLORIDE, SODIUM LACTATE, POTASSIUM CHLORIDE, AND CALCIUM CHLORIDE: 600; 310; 30; 20 INJECTION, SOLUTION INTRAVENOUS at 09:52

## 2023-08-30 RX ADMIN — EPHEDRINE SULFATE 10 MG: 50 INJECTION INTRAMUSCULAR; INTRAVENOUS; SUBCUTANEOUS at 10:23

## 2023-08-30 RX ADMIN — SODIUM CHLORIDE 1000 MG: 9 INJECTION INTRAMUSCULAR; INTRAVENOUS; SUBCUTANEOUS at 10:05

## 2023-08-30 RX ADMIN — EPHEDRINE SULFATE 10 MG: 50 INJECTION INTRAMUSCULAR; INTRAVENOUS; SUBCUTANEOUS at 10:34

## 2023-08-30 RX ADMIN — ROCURONIUM BROMIDE 25 MG: 10 INJECTION, SOLUTION INTRAVENOUS at 10:25

## 2023-08-30 RX ADMIN — EPHEDRINE SULFATE 10 MG: 50 INJECTION INTRAMUSCULAR; INTRAVENOUS; SUBCUTANEOUS at 10:13

## 2023-08-30 ASSESSMENT — ENCOUNTER SYMPTOMS: SHORTNESS OF BREATH: 0

## 2023-08-30 ASSESSMENT — PAIN DESCRIPTION - DESCRIPTORS: DESCRIPTORS: ACHING

## 2023-08-30 ASSESSMENT — PAIN - FUNCTIONAL ASSESSMENT: PAIN_FUNCTIONAL_ASSESSMENT: 0-10

## 2023-08-30 ASSESSMENT — LIFESTYLE VARIABLES: SMOKING_STATUS: 0

## 2023-08-30 ASSESSMENT — PAIN SCALES - GENERAL
PAINLEVEL_OUTOF10: 0
PAINLEVEL_OUTOF10: 4

## 2023-08-30 NOTE — OP NOTE
Brief Operative Note      Patient: Margo Monge  YOB: 1945  MRN: [id number]    Date of Procedure: 8/30/2023    Pre-Op Diagnosis Codes:     * Gross hematuria [R31.0]    Post-Op Diagnosis: Same       Procedure(s):  CYSTOSCOPY BILATERAL URETERAL CATHETERIZATIONS & BILATERAL RETROGRADE PYELOGRAMS    Surgeon(s):  Ilya Thomas MD    Assistant:   * No surgical staff found *    Anesthesia: General    Estimated Blood Loss (mL): 0    Complications: None    Specimens:   * No specimens in log *    Implants:  * No implants in log *      Drains: * No LDAs found *    Findings: Anterior urethra with some friable urethral mucosa scuffed from cystoscope passage. Bladder neck widely open. Bladder normal without lesion. Normal bilateral retrograde pyelograms        Detailed Description of Procedure:    The dictated report [id number]    Disposition to PACU then to op care outpatient follow-up in 1 month with PSA prior would recommend a follow-up renal ultrasound in 6 months    Electronically signed by Ari Huerta MD on 8/30/2023 at 10:52 AM
[facility] [address], [address] [place] [address]                                OPERATIVE REPORT    PATIENT NAME: Feliberto Estrada                   :        1945  MED REC NO:   [id number]                              ROOM:  ACCOUNT NO:   [de-identified]                           ADMIT DATE: 2023  PROVIDER:     Jen Sloan MD    DATE OF PROCEDURE:  2023    RADIOGRAPHIC INTERPRETATION OF BILATERAL RETROGRADE PYELOGRAMS    1. Right retrograde pyelogram.    INTERPRETATION:  The right ureter is intubated cystoscopically with a  ureteral catheter, contrast was injected to opacify the ureter. This  shows a normal-appearing ureter. The right renal pelvis also appears to  be normal.  There is no hydronephrosis and there are no filling defects. CONCLUSION:  Normal-appearing right retrograde pyelogram.      2.  Left retrograde pyelogram.    INTERPRETATION:  The left ureter is cystoscopically intubated with  ureteral catheter, contrast injected to opacify the left ureter and  upper collecting system including the renal pelvis. This shows a  normal-appearing left ureter. The upper collecting system also appears  normal with no filling defects, hydronephrosis, or evidence of  obstruction.     CONCLUSION:  Normal left retrograde pyelogram.        Jen Sloan MD    D: 2023 12:03:36      T: 2023 13:56:16     [id number]  Job#: [id number]     Doc#: [id number]    CC:
him for hematuria. The risks and complications of  the procedure were discussed with the patient including the risk of  bleeding, infection, dysuria, injury to the ureters, injury to the  bladder, need for subsequent adjuvant or repeat procedures. I did tell  him, if we find something in the bladder at the time of cystoscopy, we  may go ahead and take care while he is asleep. He understood and agrees  to proceed. DESCRIPTION OF PROCEDURE:  The patient was brought to the operating  room. He underwent general anesthetic. He was placed in the lithotomy  position. His genitalia was prepped and draped in routine sterile  fashion. A time-out was performed. He received a preoperative  antibiotic. A 22-Tajik cystoscope was inserted into the meatus. Then, I advanced  this under direct vision. His urethra was accommodating for the scope,  but somewhat small caliber and passing the scope scuffed the urethral  mucosal lining and the mucosa was a little bit friable to begin with,  but there was no obvious stricture. When I got down to the membranous  urethra, he had good coaptation. I then entered the posterior urethra  and he has a wide open urethrovesical anastomotic opening at the bladder  neck. The prostate was surgically absent. There were no lesions or  masses or evidence of recurrent prostate cancer at this level. I then advanced the scope all the way into the patient's bladder and  inspected the bladder throughout with a 30 and a 70-degree lens. The  bladder was mildly trabeculated, but there were no papillary or mucosal  lesions or abnormalities seen and the right and left ureteral orifices  were normal in normal anatomic position effluxing clear urine. With 30-degree lens, I then performed retrograde pyelograms.   A 5-Tajik  catheter was used under direct cystoscopic visualization to intubate the  right and left ureteral orifice and contrast was injected retrograde to  opacify the ureters

## 2023-08-30 NOTE — PROGRESS NOTES
Patient ambulated to BR. Voided. Began to drip blood from penis. Applied pressure and back to bed. Notified Dr. Antoinette Alfonso, no new orders. After lying down it subsided. Dr. Antoinette Alfonso had told family to expect this.

## 2023-08-30 NOTE — DISCHARGE INSTRUCTIONS
[facility], Dr Vera Viera      Cystoscopy / Ureteroscopy / Bladder Endoscopy Procedures Discharge Instructions    You may experience : Burning sensation when you void     A feeling of a need to go to the bathroom frequently     You may have urgent urination     Your urine may be blood tinged    These symptoms should be relieved within a few hours and days  as you increase the amounts of fluids you drink and the number of times that you empty your bladder. They may persist for 1-2 weeks if you have a stent or had a biopsy or resection. We recommended that you drink plenty of fluid a few days after surgery. If you have a ureteral stent he may have pain in your side or back related to the stent. Stents also cause bladder irritation symptoms of frequency and urgency. No strenuous activities for 1 week or  until you talk to your doctor. You may feel light headed up to 24 hours after anesthesia. You should not do the following for the next 24 hours. Drive a car, operate machinery or power tools     Drink any alcoholic drinks (not even beer or wine)     Make any important decisions, i.e., signing important papers. It is recommended that you begin with clear liquids and/or light foods. If you  Are not nauseated, progress to your normal diet. I you are unable to urinate you should call your surgeon.     Dr. Reanna Martin

## 2023-08-30 NOTE — PROGRESS NOTES
CLINICAL PHARMACY NOTE: MEDS TO BEDS    Total # of Prescriptions Filled: 1   The following medications were delivered to the patient:  Discharge Medication List as of 8/30/2023 11:51 AM        START taking these medications    Details   phenazopyridine (PYRIDIUM) 100 MG tablet Take 1 tablet by mouth 3 times daily as needed for Pain (for burning and spasms), Disp-6 tablet, R-0Normal             Additional Documentation:     Delivered Rx's to op-care. Gave Rx's to patients spouse at bedside. Paid with credit card.

## 2023-08-30 NOTE — INTERVAL H&P NOTE
Update History & Physical    The patient's History and Physical of August 8, 2023 was reviewed with the patient and I examined the patient. There was no change. The surgical site was confirmed by the patient and me. I reviewed the renal ultrasound done 7/27/2023 and compared this to a CTA done in May 2023. Hypoechoic area in the central interpolar region of the right kidney probably just represents a hypertrophied column and lobulation age mass follow-up ultrasound could be done in 6 months    Plan: The risks, benefits, expected outcome, and alternative to the recommended procedure have been discussed with the patient. Patient understands and wants to proceed with the procedure.      Electronically signed by Presley Guevara MD on 8/30/2023 at 9:41 AM

## 2023-08-30 NOTE — ANESTHESIA POSTPROCEDURE EVALUATION
Department of Anesthesiology  Postprocedure Note    Patient: Brandon Pacheco  MRN: [id number]  Birthdate: [birth year]  Date of evaluation: 8/30/2023      Procedure Summary     Date: 08/30/23 Room / Location: [facility]    Anesthesia Start: 6125 Anesthesia Stop: 1059    Procedure: CYSTOSCOPY BILATERAL URETERAL CATHETERIZATIONS & BILATERAL RETROGRADE PYELOGRAMS (Bilateral) Diagnosis:       Gross hematuria      (Gross hematuria [R31.0])    Surgeons: Dominick Cook MD Responsible Provider: ANANT Broussard CRNA    Anesthesia Type: general ASA Status: 3          Anesthesia Type: No value filed.     Saray Phase I: Saray Score: 10    Saray Phase II:        Anesthesia Post Evaluation    Patient location during evaluation: PACU  Patient participation: complete - patient participated  Level of consciousness: awake and alert  Pain score: 0  Airway patency: patent  Nausea & Vomiting: no vomiting and no nausea  Complications: no  Cardiovascular status: hemodynamically stable  Respiratory status: nasal cannula  Hydration status: stable  Pain management: adequate

## 2023-09-14 ENCOUNTER — TELEPHONE (OUTPATIENT)
Dept: UROLOGY | Age: 78
End: 2023-09-14

## 2023-09-14 NOTE — TELEPHONE ENCOUNTER
Luis Buitrago called this morning to check what the next date available with Dr. Zaira Torres would be if he rescheduled his appt, [address] has nothing available until 12/8/2023. Patient advised that his wife is currently inpatient at [facility] and he needs to be with her, he is not sure when she will be discharged. Patient wishes to know how soon he does need to follow up with the doctor, please call patient back to discuss. Thank you.

## 2023-09-21 ENCOUNTER — OFFICE VISIT (OUTPATIENT)
Dept: UROLOGY | Age: 78
End: 2023-09-21
Payer: MEDICARE

## 2023-09-21 VITALS — BODY MASS INDEX: 27.12 KG/M2 | HEIGHT: 72 IN | WEIGHT: 200.2 LBS | TEMPERATURE: 98.2 F

## 2023-09-21 DIAGNOSIS — R97.21 BIOCHEMICALLY RECURRENT MALIGNANT NEOPLASM OF PROSTATE (HCC): Primary | ICD-10-CM

## 2023-09-21 DIAGNOSIS — C61 MALIGNANT NEOPLASM OF PROSTATE (HCC): ICD-10-CM

## 2023-09-21 DIAGNOSIS — R93.421 ABNORMAL RADIOLOGIC FINDINGS ON DIAGNOSTIC IMAGING OF RIGHT KIDNEY: ICD-10-CM

## 2023-09-21 DIAGNOSIS — R31.0 GROSS HEMATURIA: ICD-10-CM

## 2023-09-21 DIAGNOSIS — C61 BIOCHEMICALLY RECURRENT MALIGNANT NEOPLASM OF PROSTATE (HCC): Primary | ICD-10-CM

## 2023-09-21 LAB — PSA SERPL-MCNC: 0.06 NG/ML (ref 0–4)

## 2023-09-21 PROCEDURE — G8427 DOCREV CUR MEDS BY ELIG CLIN: HCPCS | Performed by: NURSE PRACTITIONER

## 2023-09-21 PROCEDURE — G8417 CALC BMI ABV UP PARAM F/U: HCPCS | Performed by: NURSE PRACTITIONER

## 2023-09-21 PROCEDURE — 1123F ACP DISCUSS/DSCN MKR DOCD: CPT | Performed by: NURSE PRACTITIONER

## 2023-09-21 PROCEDURE — 99213 OFFICE O/P EST LOW 20 MIN: CPT | Performed by: NURSE PRACTITIONER

## 2023-09-21 PROCEDURE — 4004F PT TOBACCO SCREEN RCVD TLK: CPT | Performed by: NURSE PRACTITIONER

## 2023-09-21 ASSESSMENT — ENCOUNTER SYMPTOMS
BACK PAIN: 0
ABDOMINAL PAIN: 0
ABDOMINAL DISTENTION: 0
VOMITING: 0
NAUSEA: 0

## 2023-09-21 NOTE — PROGRESS NOTES
Luis Antonio Cardona is a 69 y.o., male, Established patient who presents today   Chief Complaint   Patient presents with    Follow-up     I am here today for a follow up. Patient presents for follow-up after cystoscopy and retrograde pyelograms in evaluation of  gross hematuria. In mid July, patient did experience some painless gross hematuria and could not undergo evaluation with CT urogram secondary to the recommendation of his nephrologist, so he underwent evaluation in the OR with retrograde studies on 8/30/2023. Retrograde pyelograms bilaterally were normal.  No obvious cause for hematuria was noted during the procedure. Patient has had no further episodes of hematuria. He was also noted to have an abnormal finding on a renal ultrasound that was suspicious for a mass. When comparing this to prior imaging, it appeared more consistent with lobulation of the renal cortex rather than a solid mass or lesion. Patient is typically seen in our office for prostate cancer which was first diagnosed 11 years ago. His prostate cancer can be characterized as early-stage organ confined. Pathologic stage T2b [place] 3+4 = 7 grade group 2. His PSA was undetectable until 2016 he now has a very low slightly detectable PSA consistent with biochemical recurrence and has been stable over the last 6 years.   His last several PSA values are as follows:  Lab Results   Component Value Date    PSA 0.06 09/21/2023    PSA 0.07 03/14/2023    PSA 0.07 09/06/2022    PSA 0.05 02/15/2022    PSA 0.05 08/09/2021    PSA 0.04 12/14/2020    PSA 0.04 06/16/2020    PSA 0.03 12/09/2019    PSA 0.03 06/04/2019    PSA 0.03 11/27/2018    PSA 0.02 11/01/2017    PSA 0.01 09/28/2016      Previous treatment of prostate cancer: Radical prostatectomy done April 9, 2012  Lower urinary tract symptoms: Nocturia x1 stress incontinence, feeling of incomplete emptying, frequency, intermittency, urgency, weak stream.        REVIEW OF SYSTEMS:  Review of

## 2023-10-16 ENCOUNTER — TELEPHONE (OUTPATIENT)
Dept: VASCULAR SURGERY | Age: 78
End: 2023-10-16

## 2023-10-16 NOTE — TELEPHONE ENCOUNTER
The patient called in to say that he needed to cancel his appt with [facility] and his CT for tomorrow. He stated he did not have care for his wife tomorrow. I have the patient's test and appointment cancelled.

## 2023-11-08 ENCOUNTER — TELEPHONE (OUTPATIENT)
Dept: VASCULAR SURGERY | Age: 78
End: 2023-11-08

## 2023-12-04 ENCOUNTER — OFFICE VISIT (OUTPATIENT)
Dept: VASCULAR SURGERY | Age: 78
End: 2023-12-04
Payer: MEDICARE

## 2023-12-04 ENCOUNTER — HOSPITAL ENCOUNTER (OUTPATIENT)
Dept: CT IMAGING | Age: 78
Discharge: HOME | End: 2023-12-04
Payer: MEDICARE

## 2023-12-04 ENCOUNTER — HOSPITAL ENCOUNTER (OUTPATIENT)
Dept: NON INVASIVE DIAGNOSTICS | Age: 78
Discharge: HOME | End: 2023-12-04
Payer: MEDICARE

## 2023-12-04 DIAGNOSIS — I65.23 BILATERAL CAROTID ARTERY STENOSIS: Primary | ICD-10-CM

## 2023-12-04 DIAGNOSIS — I70.213 ATHEROSCLER OF NATIVE ARTERY OF BOTH LEGS WITH INTERMIT CLAUDICATION (HCC): Primary | ICD-10-CM

## 2023-12-04 DIAGNOSIS — I71.43 INFRARENAL ABDOMINAL AORTIC ANEURYSM (AAA) WITHOUT RUPTURE (HCC): ICD-10-CM

## 2023-12-04 DIAGNOSIS — I65.23 BILATERAL CAROTID ARTERY STENOSIS: ICD-10-CM

## 2023-12-04 PROCEDURE — 99213 OFFICE O/P EST LOW 20 MIN: CPT | Performed by: NURSE PRACTITIONER

## 2023-12-04 PROCEDURE — 4004F PT TOBACCO SCREEN RCVD TLK: CPT | Performed by: NURSE PRACTITIONER

## 2023-12-04 PROCEDURE — G8417 CALC BMI ABV UP PARAM F/U: HCPCS | Performed by: NURSE PRACTITIONER

## 2023-12-04 PROCEDURE — 74176 CT ABD & PELVIS W/O CONTRAST: CPT

## 2023-12-04 PROCEDURE — G8427 DOCREV CUR MEDS BY ELIG CLIN: HCPCS | Performed by: NURSE PRACTITIONER

## 2023-12-04 PROCEDURE — 93880 EXTRACRANIAL BILAT STUDY: CPT

## 2023-12-04 PROCEDURE — 1123F ACP DISCUSS/DSCN MKR DOCD: CPT | Performed by: NURSE PRACTITIONER

## 2023-12-04 PROCEDURE — G8484 FLU IMMUNIZE NO ADMIN: HCPCS | Performed by: NURSE PRACTITIONER

## 2023-12-04 NOTE — PROGRESS NOTES
Rosette Peraza (:  ) is a 69 y.o. male,Established patient, here for evaluation of the following chief complaint(s):  Follow-up            SUBJECTIVE/OBJECTIVE:  Sandrine Baumann has a history of peripheral vascular disease of the lower extremities. He has had this for 1 - 5 years. Current treatment includes clopidogrel 75 mg po qd, ASA EC daily. Sandrine Baumann has not had new wounds. Recently, he reports claudication at a distance of  which is extended. Sandrine Baumann reports that the right leg is equal to the left. He reports claudication is not changed and is mostly in the form of crampy type pain starting in the calves. He has a short recovery time. This is reproducible in nature. He reports ischemic rest pain 0 times per night. He reports walking with cart does not help. He presents for follow up of carotid artery stenosis. He has a known history of carotid artery stenosis for 1 - 5 years. His current treatment includes clopidogrel 75 mg po qd, ASA EC daily. He denies a history of CVA. He reports no TIA's, episodes of lateralizing weakness and episodes of amaurosis fugax.       I have personally reviewed the following: problem list, current meds, allergies, PMH, PSH, family hx, and social hx  Rosette Peraza is a 69 y.o. male with the following history as recorded in [facility]:  Patient Active Problem List    Diagnosis Date Noted    Gross hematuria 08/10/2023    Memory loss 2017    Abnormal brain scan 2017    Carotid disease, bilateral ([address]) 2017    Paroxysmal atrial fibrillation ([address]) 04/15/2017    Coronary artery disease involving native coronary artery of native heart without angina pectoris     Hyperlipidemia     Hyperthyroidism     Right BBB/left ant fasc block     Essential hypertension     Sleep apnea     AAA (abdominal aortic aneurysm) without rupture ([address])     Atherosclerosis of native artery of both lower extremities with intermittent claudication ([address])     Bilateral carotid artery

## 2024-01-24 NOTE — PROGRESS NOTES
40 MG TABS tablet Take 1 tablet by mouth nightly      amLODIPine (NORVASC) 10 MG tablet Take 1 tablet by mouth daily  2    clopidogrel (PLAVIX) 75 MG tablet Take 1 tablet by mouth daily 30 tablet 0    Multiple Vitamins-Minerals (MULTI FOR HIM 50+ PO) Take 1 tablet by mouth daily       levothyroxine (SYNTHROID) 125 MCG tablet Take 1 tablet by mouth Daily Indications: Underactive Thyroid      aspirin 325 MG tablet Take 1 tablet by mouth nightly      atenolol (TENORMIN) 50 MG tablet Take 0.5 tablets by mouth daily Indications: High Blood Pressure Disorder      atorvastatin (LIPITOR) 80 MG tablet Take 1 tablet by mouth nightly Indications: Changes in Cholesterol      Cholecalciferol (VITAMIN D3) 1000 UNITS TABS Take 1 tablet by mouth daily      coenzyme Q10 200 MG CAPS capsule Take 1 capsule by mouth daily      lisinopril (PRINIVIL;ZESTRIL) 20 MG tablet Take 1 tablet by mouth daily       No current facility-administered medications for this visit.     Allergies: Pollen extract  Past Medical History:   Diagnosis Date    AAA (abdominal aortic aneurysm) without rupture (HCC)     Arthritis     Atheroscler of native artery of both legs with intermit claudication (HCC)     CAD in native artery     sees dr. heck    Carotid artery stenosis     History of blood transfusion     Hyperlipidemia     Hypertension     Hyperthyroidism     Iron deficiency     Malignant neoplasm prostate (HCC)     surgery    Right BBB/left ant fasc block     per dr. heck; awaiting ekg from [facility]/office.    Sleep apnea     cpap used    TIA (transient ischemic attack)      Past Surgical History:   Procedure Laterality Date    ABDOMINAL AORTIC ANEURYSM REPAIR, ENDOVASCULAR N/A 6/5/2023    ENDOVASCULAR ANEURYSM REPAIR performed by Mckenzie Wylie DO at [facility] OR    CAROTID ENDARTERECTOMY Left 04/12/2017    SJS.Left carotid endarterectomy with bovine pericardial catch.Left cervical carotid arteriograms.    CATARACT REMOVAL Bilateral     CORONARY ANGIOPLASTY

## 2024-01-29 ENCOUNTER — HOSPITAL ENCOUNTER (OUTPATIENT)
Dept: VASCULAR LAB | Age: 79
Discharge: HOME | End: 2024-01-29
Payer: MEDICARE

## 2024-01-29 ENCOUNTER — OFFICE VISIT (OUTPATIENT)
Dept: VASCULAR SURGERY | Age: 79
End: 2024-01-29
Payer: MEDICARE

## 2024-01-29 VITALS
OXYGEN SATURATION: 98 % | SYSTOLIC BLOOD PRESSURE: 143 MMHG | DIASTOLIC BLOOD PRESSURE: 75 MMHG | HEART RATE: 68 BPM | TEMPERATURE: 97.9 F

## 2024-01-29 DIAGNOSIS — I65.23 BILATERAL CAROTID ARTERY STENOSIS: ICD-10-CM

## 2024-01-29 DIAGNOSIS — I70.213 ATHEROSCLER OF NATIVE ARTERY OF BOTH LEGS WITH INTERMIT CLAUDICATION (HCC): ICD-10-CM

## 2024-01-29 DIAGNOSIS — I71.43 INFRARENAL ABDOMINAL AORTIC ANEURYSM (AAA) WITHOUT RUPTURE (HCC): ICD-10-CM

## 2024-01-29 DIAGNOSIS — I70.213 ATHEROSCLER OF NATIVE ARTERY OF BOTH LEGS WITH INTERMIT CLAUDICATION (HCC): Primary | ICD-10-CM

## 2024-01-29 PROCEDURE — 99214 OFFICE O/P EST MOD 30 MIN: CPT | Performed by: NURSE PRACTITIONER

## 2024-01-29 PROCEDURE — 93923 UPR/LXTR ART STDY 3+ LVLS: CPT

## 2024-01-29 PROCEDURE — G8484 FLU IMMUNIZE NO ADMIN: HCPCS | Performed by: NURSE PRACTITIONER

## 2024-01-29 PROCEDURE — 1123F ACP DISCUSS/DSCN MKR DOCD: CPT | Performed by: NURSE PRACTITIONER

## 2024-01-29 PROCEDURE — 4004F PT TOBACCO SCREEN RCVD TLK: CPT | Performed by: NURSE PRACTITIONER

## 2024-01-29 PROCEDURE — G8417 CALC BMI ABV UP PARAM F/U: HCPCS | Performed by: NURSE PRACTITIONER

## 2024-01-29 PROCEDURE — 3077F SYST BP >= 140 MM HG: CPT | Performed by: NURSE PRACTITIONER

## 2024-01-29 PROCEDURE — G8427 DOCREV CUR MEDS BY ELIG CLIN: HCPCS | Performed by: NURSE PRACTITIONER

## 2024-01-29 PROCEDURE — 3078F DIAST BP <80 MM HG: CPT | Performed by: NURSE PRACTITIONER

## 2024-03-15 ENCOUNTER — HOSPITAL ENCOUNTER (OUTPATIENT)
Dept: ULTRASOUND IMAGING | Age: 79
Discharge: HOME | End: 2024-03-15
Payer: MEDICARE

## 2024-03-15 ENCOUNTER — HOSPITAL ENCOUNTER (OUTPATIENT)
Dept: CT IMAGING | Age: 79
Discharge: HOME | End: 2024-03-15
Attending: INTERNAL MEDICINE
Payer: MEDICARE

## 2024-03-15 DIAGNOSIS — N28.89 RIGHT RENAL MASS: ICD-10-CM

## 2024-03-15 DIAGNOSIS — R93.421 ABNORMAL RADIOLOGIC FINDINGS ON DIAGNOSTIC IMAGING OF RIGHT KIDNEY: ICD-10-CM

## 2024-03-15 PROCEDURE — 76770 US EXAM ABDO BACK WALL COMP: CPT

## 2024-03-15 PROCEDURE — 74150 CT ABDOMEN W/O CONTRAST: CPT

## 2024-03-19 ENCOUNTER — TELEPHONE (OUTPATIENT)
Dept: UROLOGY | Age: 79
End: 2024-03-19

## 2024-03-19 DIAGNOSIS — C61 MALIGNANT NEOPLASM OF PROSTATE (HCC): Primary | ICD-10-CM

## 2024-03-19 DIAGNOSIS — R31.0 GROSS HEMATURIA: ICD-10-CM

## 2024-03-19 NOTE — TELEPHONE ENCOUNTER
Patient contacted  to let us know that he has moved and is needing a referral closer to home. He has relocated to [place], [place] and would referral to a local Urologist.     Office has faxed referral to [facility] and patient should be contacted from them for an appointment.
